# Patient Record
Sex: FEMALE | Race: WHITE | NOT HISPANIC OR LATINO | Employment: FULL TIME | URBAN - METROPOLITAN AREA
[De-identification: names, ages, dates, MRNs, and addresses within clinical notes are randomized per-mention and may not be internally consistent; named-entity substitution may affect disease eponyms.]

---

## 2017-01-18 ENCOUNTER — PATIENT MESSAGE (OUTPATIENT)
Dept: FAMILY MEDICINE | Facility: CLINIC | Age: 52
End: 2017-01-18

## 2017-01-18 DIAGNOSIS — Z00.00 ROUTINE GENERAL MEDICAL EXAMINATION AT A HEALTH CARE FACILITY: Primary | ICD-10-CM

## 2017-03-23 ENCOUNTER — OFFICE VISIT (OUTPATIENT)
Dept: FAMILY MEDICINE | Facility: CLINIC | Age: 52
End: 2017-03-23
Payer: COMMERCIAL

## 2017-03-23 VITALS
HEIGHT: 63 IN | WEIGHT: 134.5 LBS | TEMPERATURE: 98 F | DIASTOLIC BLOOD PRESSURE: 88 MMHG | BODY MASS INDEX: 23.83 KG/M2 | SYSTOLIC BLOOD PRESSURE: 118 MMHG

## 2017-03-23 DIAGNOSIS — Z12.11 SCREEN FOR COLON CANCER: Primary | ICD-10-CM

## 2017-03-23 DIAGNOSIS — R31.9 URINARY TRACT INFECTION WITH HEMATURIA, SITE UNSPECIFIED: ICD-10-CM

## 2017-03-23 DIAGNOSIS — N39.0 URINARY TRACT INFECTION WITH HEMATURIA, SITE UNSPECIFIED: ICD-10-CM

## 2017-03-23 LAB
BILIRUB SERPL-MCNC: NEGATIVE MG/DL
BLOOD URINE, POC: NORMAL
COLOR, POC UA: NORMAL
GLUCOSE UR QL STRIP: NORMAL
KETONES UR QL STRIP: NEGATIVE
LEUKOCYTE ESTERASE URINE, POC: NORMAL
NITRITE, POC UA: NEGATIVE
PH, POC UA: 8
PROTEIN, POC: NEGATIVE
SPECIFIC GRAVITY, POC UA: 1.01
UROBILINOGEN, POC UA: NORMAL

## 2017-03-23 PROCEDURE — 99213 OFFICE O/P EST LOW 20 MIN: CPT | Mod: 25,S$GLB,, | Performed by: FAMILY MEDICINE

## 2017-03-23 PROCEDURE — 99999 PR PBB SHADOW E&M-EST. PATIENT-LVL III: CPT | Mod: PBBFAC,,, | Performed by: FAMILY MEDICINE

## 2017-03-23 PROCEDURE — 1160F RVW MEDS BY RX/DR IN RCRD: CPT | Mod: S$GLB,,, | Performed by: FAMILY MEDICINE

## 2017-03-23 PROCEDURE — 87086 URINE CULTURE/COLONY COUNT: CPT

## 2017-03-23 PROCEDURE — 81002 URINALYSIS NONAUTO W/O SCOPE: CPT | Mod: S$GLB,,, | Performed by: FAMILY MEDICINE

## 2017-03-23 NOTE — PATIENT INSTRUCTIONS
"    PLEASE CALL [phone] TO DISCUSS SCHEDULING YOUR COLONOSCOPY OR UPPER GI SCOPE (EGD).     They will also discuss the different cleanses to take the night before your colonoscopy, so that the physician can effectively view the lining of your colon.     ==================      Do not wear thongs because these push the "bugs" around the rectum to the entrance of your bladder    Drink 6-8 glasses of water per day - until your urine is clear    Avoid caffeine , stimulants, sudafed, antihistamines (Claritin, Zyrtec, Allegra), and irritants    Empty bladder every 3-4 hours    Urinate before and after intercourse    If you wear pads, change them every 3-4 hours to keep the area dry    If not better, consider seeing Urology    "

## 2017-03-23 NOTE — PROGRESS NOTES
"Subjective:      Patient ID: Grace Beltran is a 51 y.o. female.    Chief Complaint: Urinary Tract Infection    Here today for recurrent bladder symptoms. Recently tx with levofloxacin for 7 days at urgent care center.  She states that they called and said that urine culture was negative.  She still has frequency and urgency.  She drinks several cup of coffee and caffeinated tea to stay awake with work.    Current Outpatient Prescriptions on File Prior to Visit   Medication Sig    citalopram (CELEXA) 40 MG tablet Take 1 tablet (40 mg total) by mouth once daily.     No current facility-administered medications on file prior to visit.      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 Bowel sounds are normal. She exhibits no mass. There is no tenderness. There is no rebound, no guarding and no CVA tenderness.     URINALYSIS -   5 blood  - nitrite  tr leukocytes  No ketones    Assessment:     1. Screen for colon cancer    2. Urinary tract infection with hematuria, site unspecified      Plan:     Orders Placed This Encounter    Urine culture    POCT urine dipstick without microscope    Case request GI: COLONOSCOPY       Patient Instructions       PLEASE CALL " "[phone] TO DISCUSS SCHEDULING YOUR COLONOSCOPY OR UPPER GI SCOPE (EGD).     They will also discuss the different cleanses to take the night before your colonoscopy, so that the physician can effectively view the lining of your colon.     ==================      Do not wear thongs because these push the "bugs" around the rectum to the entrance of your bladder    Drink 6-8 glasses of water per day - until your urine is clear    Avoid caffeine , stimulants, sudafed, antihistamines (Claritin, Zyrtec, Allegra), and irritants    Empty bladder every 3-4 hours    Urinate before and after intercourse    If you wear pads, change them every 3-4 hours to keep the area dry    If not better, consider seeing Urology                                "

## 2017-03-23 NOTE — MR AVS SNAPSHOT
"    [facility]  [address] [facility], [address]  [facility] [address]  Phone: [phone]  Fax: [phone]                  Grace Beltran   3/23/2017 11:20 AM   Office Visit    Description:  Female : 1965   Provider:  Evelia Wynn MD   Department:  [facility]           Reason for Visit     Urinary Tract Infection           Diagnoses this Visit        Comments    Screen for colon cancer    -  Primary            To Do List           Future Appointments        Provider Department Dept Phone    2017 8:00 AM LAB, [facility] [phone]    2017 10:00 AM Evelia Wynn MD [facility] [phone]      Goals (5 Years of Data)     None      [facility]     [facility] On Call Nurse Care Line -  Assistance  Registered nurses in the [facility] provide clinical advisement, health education, appointment booking, and other advisory services.  Call for this free service at [phone].             Medications           Message regarding Medications     Verify the changes and/or additions to your medication regime listed below are the same as discussed with your clinician today.  If any of these changes or additions are incorrect, please notify your healthcare provider.             Verify that the below list of medications is an accurate representation of the medications you are currently taking.  If none reported, the list may be blank. If incorrect, please contact your healthcare provider. Carry this list with you in case of emergency.           Current Medications     citalopram (CELEXA) 40 MG tablet Take 1 tablet (40 mg total) by mouth once daily.           Clinical Reference Information           Your Vitals Were     BP Temp Height Weight Last Period BMI    118/88 98.2 °F (36.8 °C) 5' 3" (1.6 m) 61 kg (134 lb 7.7 oz) 2016 (Approximate) 23.82 kg/m2      Blood Pressure          Most " "Recent Value    BP  118/88      Allergies as of 3/23/2017     Codeine    Hydrocodone    Promethazine      Immunizations Administered on Date of Encounter - 3/23/2017     None      Orders Placed During Today's Visit      Normal Orders This Visit    Case request GI: COLONOSCOPY       Instructions        PLEASE CALL [phone] TO DISCUSS SCHEDULING YOUR COLONOSCOPY OR UPPER GI SCOPE (EGD).     They will also discuss the different cleanses to take the night before your colonoscopy, so that the physician can effectively view the lining of your colon.     ==================      Do not wear thongs because these push the "bugs" around the rectum to the entrance of your bladder    Drink 6-8 glasses of water per day - until your urine is clear    Avoid caffeine , stimulants, sudafed, antihistamines (Claritin, Zyrtec, Allegra), and irritants    Empty bladder every 3-4 hours    Urinate before and after intercourse    If you wear pads, change them every 3-4 hours to keep the area dry    Follow up  if symptoms worsen or persist         Language Assistance Services     ATTENTION: Language assistance services are available, free of charge. Please call [phone].      ATENCIÓN: Si habla español, tiene a delgado disposición servicios gratuitos de asistencia lingüística. Llame al [phone].     GINO Ý: N?u b?n nói Ti?ng Vi?t, có các d?ch v? h? tr? ngôn ng? mi?n phí dành cho b?n. G?i s? [phone].         [facility] complies with applicable Federal civil rights laws and does not discriminate on the basis of race, color, national origin, age, disability, or sex.        "

## 2017-03-25 LAB — BACTERIA UR CULT: NO GROWTH

## 2017-05-18 ENCOUNTER — LAB VISIT (OUTPATIENT)
Dept: LAB | Facility: HOSPITAL | Age: 52
End: 2017-05-18
Attending: FAMILY MEDICINE
Payer: COMMERCIAL

## 2017-05-18 DIAGNOSIS — Z00.00 ROUTINE GENERAL MEDICAL EXAMINATION AT A HEALTH CARE FACILITY: ICD-10-CM

## 2017-05-18 LAB
ALBUMIN SERPL BCP-MCNC: 3.5 G/DL
ALP SERPL-CCNC: 78 U/L
ALT SERPL W/O P-5'-P-CCNC: 28 U/L
ANION GAP SERPL CALC-SCNC: 8 MMOL/L
AST SERPL-CCNC: 22 U/L
BASOPHILS # BLD AUTO: 0.01 K/UL
BASOPHILS NFR BLD: 0.3 %
BILIRUB SERPL-MCNC: 0.4 MG/DL
BUN SERPL-MCNC: 9 MG/DL
CALCIUM SERPL-MCNC: 9.2 MG/DL
CHLORIDE SERPL-SCNC: 106 MMOL/L
CHOLEST/HDLC SERPL: 3.2 {RATIO}
CO2 SERPL-SCNC: 29 MMOL/L
CREAT SERPL-MCNC: 0.8 MG/DL
DIFFERENTIAL METHOD: ABNORMAL
EOSINOPHIL # BLD AUTO: 0.3 K/UL
EOSINOPHIL NFR BLD: 8.8 %
ERYTHROCYTE [DISTWIDTH] IN BLOOD BY AUTOMATED COUNT: 13.2 %
EST. GFR  (AFRICAN AMERICAN): >60 ML/MIN/1.73 M^2
EST. GFR  (NON AFRICAN AMERICAN): >60 ML/MIN/1.73 M^2
GLUCOSE SERPL-MCNC: 84 MG/DL
HCT VFR BLD AUTO: 43.1 %
HCV AB SERPL QL IA: NEGATIVE
HDL/CHOLESTEROL RATIO: 31.2 %
HDLC SERPL-MCNC: 218 MG/DL
HDLC SERPL-MCNC: 68 MG/DL
HGB BLD-MCNC: 14.2 G/DL
LDLC SERPL CALC-MCNC: 127.2 MG/DL
LYMPHOCYTES # BLD AUTO: 1 K/UL
LYMPHOCYTES NFR BLD: 31.1 %
MCH RBC QN AUTO: 29.5 PG
MCHC RBC AUTO-ENTMCNC: 32.9 %
MCV RBC AUTO: 90 FL
MONOCYTES # BLD AUTO: 0.4 K/UL
MONOCYTES NFR BLD: 12.6 %
NEUTROPHILS # BLD AUTO: 1.5 K/UL
NEUTROPHILS NFR BLD: 47.2 %
NONHDLC SERPL-MCNC: 150 MG/DL
PLATELET # BLD AUTO: 256 K/UL
PMV BLD AUTO: 10.3 FL
POTASSIUM SERPL-SCNC: 4.2 MMOL/L
PROT SERPL-MCNC: 6.8 G/DL
RBC # BLD AUTO: 4.81 M/UL
SODIUM SERPL-SCNC: 143 MMOL/L
TRIGL SERPL-MCNC: 114 MG/DL
TSH SERPL DL<=0.005 MIU/L-ACNC: 1.08 UIU/ML
WBC # BLD AUTO: 3.18 K/UL

## 2017-05-18 PROCEDURE — 36415 COLL VENOUS BLD VENIPUNCTURE: CPT | Mod: PO

## 2017-05-18 PROCEDURE — 80061 LIPID PANEL: CPT

## 2017-05-18 PROCEDURE — 85025 COMPLETE CBC W/AUTO DIFF WBC: CPT

## 2017-05-18 PROCEDURE — 84443 ASSAY THYROID STIM HORMONE: CPT

## 2017-05-18 PROCEDURE — 80053 COMPREHEN METABOLIC PANEL: CPT

## 2017-05-18 PROCEDURE — 86803 HEPATITIS C AB TEST: CPT

## 2017-05-24 ENCOUNTER — OFFICE VISIT (OUTPATIENT)
Dept: FAMILY MEDICINE | Facility: CLINIC | Age: 52
End: 2017-05-24
Payer: COMMERCIAL

## 2017-05-24 VITALS
DIASTOLIC BLOOD PRESSURE: 72 MMHG | HEIGHT: 64 IN | HEART RATE: 64 BPM | SYSTOLIC BLOOD PRESSURE: 104 MMHG | BODY MASS INDEX: 23.34 KG/M2 | TEMPERATURE: 98 F | WEIGHT: 136.69 LBS

## 2017-05-24 DIAGNOSIS — M16.12 PRIMARY OSTEOARTHRITIS OF LEFT HIP: ICD-10-CM

## 2017-05-24 DIAGNOSIS — N95.1 HOT FLASHES DUE TO MENOPAUSE: ICD-10-CM

## 2017-05-24 DIAGNOSIS — Z12.11 SCREEN FOR COLON CANCER: ICD-10-CM

## 2017-05-24 DIAGNOSIS — M85.88 OSTEOPENIA OF SPINE: ICD-10-CM

## 2017-05-24 DIAGNOSIS — F41.9 ANXIETY: ICD-10-CM

## 2017-05-24 DIAGNOSIS — M25.552 LEFT HIP PAIN: ICD-10-CM

## 2017-05-24 DIAGNOSIS — Z00.00 ROUTINE GENERAL MEDICAL EXAMINATION AT A HEALTH CARE FACILITY: Primary | ICD-10-CM

## 2017-05-24 DIAGNOSIS — K59.09 CHRONIC CONSTIPATION: ICD-10-CM

## 2017-05-24 PROCEDURE — 99396 PREV VISIT EST AGE 40-64: CPT | Mod: S$GLB,,, | Performed by: FAMILY MEDICINE

## 2017-05-24 PROCEDURE — 99999 PR PBB SHADOW E&M-EST. PATIENT-LVL III: CPT | Mod: PBBFAC,,, | Performed by: FAMILY MEDICINE

## 2017-05-24 RX ORDER — CITALOPRAM 40 MG/1
40 TABLET, FILM COATED ORAL DAILY
Qty: 90 TABLET | Refills: 3 | Status: SHIPPED | OUTPATIENT
Start: 2017-05-24 | End: 2018-06-20 | Stop reason: SDUPTHER

## 2017-05-24 NOTE — PROGRESS NOTES
Subjective:      Patient ID: Grace Beltran is a 51 y.o. female.    Chief Complaint: Annual Exam (refill CELEXA)    Here today for general exam.     Celexa works well for anxiety and she would like refills neck    She has persistent left hip pain and injection did not last very long.  She has not been to physical therapy for this and would like referral    She was diagnosed with osteopenia in the past, asking that calcium and vitamin D supplementation.  She is due to see her gynecologist    She has chronic constipation and takes fiber on a regular basis, never had colonoscopy but is willing to have one this summer    Denies any chest pain, shortness of breath, nausea vomitingdiarrhea, blood in stool, heartburn    The 10-year ASCVD risk score (Debbie ANN Jr., et al., 2013) is: 0.9%    Values used to calculate the score:      Age: 51 years      Sex: Female      Is Non- : No      Diabetic: No      Tobacco smoker: No      Systolic Blood Pressure: 104 mmHg      Is BP treated: No      HDL Cholesterol: 68 mg/dL      Total Cholesterol: 218 mg/dL    No results found for: HGBA1C  No results found for: MICROALBUR  Lab Results   Component Value Date    LDLCALC 127.2 05/18/2017    LDLCALC 128.2 11/09/2015    CHOL 218 (H) 05/18/2017    HDL 68 05/18/2017    TRIG 114 05/18/2017       Lab Results   Component Value Date     05/18/2017    K 4.2 05/18/2017     05/18/2017    CO2 29 05/18/2017    GLU 84 05/18/2017    BUN 9 05/18/2017    CREATININE 0.8 05/18/2017    CALCIUM 9.2 05/18/2017    PROT 6.8 05/18/2017    ALBUMIN 3.5 05/18/2017    BILITOT 0.4 05/18/2017    ALKPHOS 78 05/18/2017    AST 22 05/18/2017    ALT 28 05/18/2017    ANIONGAP 8 05/18/2017    ESTGFRAFRICA >60.0 05/18/2017    EGFRNONAA >60.0 05/18/2017    WBC 3.18 (L) 05/18/2017    HGB 14.2 05/18/2017    HCT 43.1 05/18/2017    MCV 90 05/18/2017     05/18/2017    TSH 1.077 05/18/2017         Current Outpatient Prescriptions on File  "Prior to Visit   Medication Sig    [DISCONTINUED] citalopram (CELEXA) 40 MG tablet Take 1 tablet (40 mg total) by mouth once daily.     No current facility-administered medications on file prior to visit.      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 She appears well-developed and well-nourished.   HENT:   Head: Normocephalic and atraumatic.   Right Ear: External ear normal.   Left Ear: External ear normal.   Nose: Nose normal.   Mouth/Throat: Oropharynx is clear and moist.   Eyes: EOM are normal. Pupils are equal, round, and reactive to light.   Neck: Neck supple. No thyromegaly present.   Cardiovascular: Normal rate, regular rhythm, normal heart sounds and intact distal pulses.    No murmur heard.  Pulmonary/Chest: Effort normal and breath sounds normal. She has no wheezes.   Abdominal: Soft. Bowel sounds are normal. She exhibits no distension and no mass. There is no tenderness. There is no rebound and no guarding.   Musculoskeletal: She " exhibits no edema.   Lymphadenopathy:     She has no cervical adenopathy.   Neurological: She is alert and oriented to person, place, and time.   Skin: Skin is warm and dry.   Psychiatric: She has a normal mood and affect. Her behavior is normal.     Assessment:     1. Routine general medical examination at a health care facility    2. Screen for colon cancer    3. Anxiety    4. Osteopenia of spine    5. Chronic constipation    6. Hot flashes due to menopause    7. Left hip pain    8. Primary osteoarthritis of left hip      Plan:     Orders Placed This Encounter    citalopram (CELEXA) 40 MG tablet    Case request GI: COLONOSCOPY       Patient Instructions   Follow with GYN  ========      PLEASE CALL [phone] TO DISCUSS SCHEDULING YOUR COLONOSCOPY OR UPPER GI SCOPE (EGD).     They will also discuss the different cleanses to take the night before your colonoscopy, so that the physician can effectively view the lining of your colon.     ======    Yes!    There are some safe over the counter medications to try for menopausal symptoms of hot flashes, mood swings, etc.    Since they are not regulated, you may need to take ONE of these DAILY FOR ABOUT 3 months to notice a difference.     Try ONE of these DAILY FOR 3 MONTHS:    Poise, Estroven, black cohosh ,soy (milk, Edamame beans - frozen section of grocery dept)    Also, most women notice that they have less hot flashes if they get 20 minutes of rigorous exercise daily (huffing and puffing for 20 minutes straight).     We try these safer alternatives before starting hormone replacement - because with hormones, there is a slight increased risk of clots, heart attack, stroke and cancer. Although, there are much smaller dosages that do help with hot flashes and emotions. We can discuss this in further if we decide to go this route.     Take care!  -----------------------------------------    Your Bone density test shows OSTEOPENIA - In between normal bone strength and  weakened bone strength with osteoporosis.     You are at slightly increased risk for hip fracture, spinal compression fracture in the future, but the experts do not recommend taking a prescription medication at this time    WEIGHT BEARING EXERCISE (carrying light weights) is the BEST way to strengthen the bone where your muscles insert & prevent future fractures.     Focus on 1200 mg of CALCIUM daily  - Which is the equivalent of 3 glasses of milk daily. If you cannot tolerate this, you can substitute yogurt or cheese for one of these servings.  Eat foods rich in calcium.Also you can take TUMS supplements or Viactiv chocolate chews calcium supplement.     Also, 800 units of VITAMIN D daily - This is the equivalent of 10 minutes of direct sunlight daily. If you are not in the sun, consider Foods rich in vitamin D and over the counter  supplement .     Let's repeat this test in 2 years. This is a test that is easily done on the same day as your mammogram      ===================================================    RECOMMENDATIONS FOR FEMALES    Prevent the #1 cause of death- cardiovascular disease (HEART ATTACK & STROKE) by checking for normal blood pressure, cholesterol, sugars, & by not smoking.     VACCINES: Yearly FLU shot, ONE PNEUMONIA shot after 65, ONE SHINGLES shot after 60    Screening colonoscopy at AGE  50 & every 10 years to check for COLON CANCER,  one of the most common & preventable cancers. Repeat in 3 years if POLYP found    I recommend  high fiber (5 fresh fruits or vegetables daily), low fat diet and aerobic  exercise (huffing/ puffing/ sweating for 20 min straight at least 4 days a week)    Follow up yearly  fasting lipids, CMP, CBC, TSH prior.   ==============================================================    You can call the [facility], physical therapy center for an appointment    [address] [facility] - not far from the [address] [place] [address], right past  [place]  [phone]      They will help diagnose the cause of your problem.     Let me know if your symptoms persist

## 2017-05-24 NOTE — PATIENT INSTRUCTIONS
Follow with GYN  ========      PLEASE CALL [phone] TO DISCUSS SCHEDULING YOUR COLONOSCOPY OR UPPER GI SCOPE (EGD).     They will also discuss the different cleanses to take the night before your colonoscopy, so that the physician can effectively view the lining of your colon.     ======    Yes!    There are some safe over the counter medications to try for menopausal symptoms of hot flashes, mood swings, etc.    Since they are not regulated, you may need to take ONE of these DAILY FOR ABOUT 3 months to notice a difference.     Try ONE of these DAILY FOR 3 MONTHS:    Poise, Estroven, black cohosh ,soy (milk, Edamame beans - frozen section of grocery dept)    Also, most women notice that they have less hot flashes if they get 20 minutes of rigorous exercise daily (huffing and puffing for 20 minutes straight).     We try these safer alternatives before starting hormone replacement - because with hormones, there is a slight increased risk of clots, heart attack, stroke and cancer. Although, there are much smaller dosages that do help with hot flashes and emotions. We can discuss this in further if we decide to go this route.     Take care!  -----------------------------------------    Your Bone density test shows OSTEOPENIA - In between normal bone strength and weakened bone strength with osteoporosis.     You are at slightly increased risk for hip fracture, spinal compression fracture in the future, but the experts do not recommend taking a prescription medication at this time    WEIGHT BEARING EXERCISE (carrying light weights) is the BEST way to strengthen the bone where your muscles insert & prevent future fractures.     Focus on 1200 mg of CALCIUM daily  - Which is the equivalent of 3 glasses of milk daily. If you cannot tolerate this, you can substitute yogurt or cheese for one of these servings.  Eat foods rich in calcium.Also you can take TUMS supplements or Viactiv chocolate chews calcium supplement.     Also,  800 units of VITAMIN D daily - This is the equivalent of 10 minutes of direct sunlight daily. If you are not in the sun, consider Foods rich in vitamin D and over the counter  supplement .     Let's repeat this test in 2 years. This is a test that is easily done on the same day as your mammogram      ===================================================    RECOMMENDATIONS FOR FEMALES    Prevent the #1 cause of death- cardiovascular disease (HEART ATTACK & STROKE) by checking for normal blood pressure, cholesterol, sugars, & by not smoking.     VACCINES: Yearly FLU shot, ONE PNEUMONIA shot after 65, ONE SHINGLES shot after 60    Screening colonoscopy at AGE  50 & every 10 years to check for COLON CANCER,  one of the most common & preventable cancers. Repeat in 3 years if POLYP found    I recommend  high fiber (5 fresh fruits or vegetables daily), low fat diet and aerobic  exercise (huffing/ puffing/ sweating for 20 min straight at least 4 days a week)    Follow up yearly  fasting lipids, CMP, CBC, TSH prior.   ==============================================================    You can call the [facility], physical therapy center for an appointment    [address] [facility] - not far from the [address] [place] [address], right past [facility]  [phone]      They will help diagnose the cause of your problem.     Let me know if your symptoms persist

## 2017-05-30 DIAGNOSIS — Z12.11 SPECIAL SCREENING FOR MALIGNANT NEOPLASMS, COLON: Primary | ICD-10-CM

## 2017-05-30 RX ORDER — POLYETHYLENE GLYCOL 3350, SODIUM SULFATE ANHYDROUS, SODIUM BICARBONATE, SODIUM CHLORIDE, POTASSIUM CHLORIDE 236; 22.74; 6.74; 5.86; 2.97 G/4L; G/4L; G/4L; G/4L; G/4L
4 POWDER, FOR SOLUTION ORAL ONCE
Qty: 4000 ML | Refills: 0 | Status: SHIPPED | OUTPATIENT
Start: 2017-05-30 | End: 2017-05-30

## 2017-07-05 ENCOUNTER — PATIENT MESSAGE (OUTPATIENT)
Dept: FAMILY MEDICINE | Facility: CLINIC | Age: 52
End: 2017-07-05

## 2017-07-20 ENCOUNTER — OFFICE VISIT (OUTPATIENT)
Dept: FAMILY MEDICINE | Facility: CLINIC | Age: 52
End: 2017-07-20
Payer: COMMERCIAL

## 2017-07-20 VITALS
BODY MASS INDEX: 23.49 KG/M2 | SYSTOLIC BLOOD PRESSURE: 118 MMHG | HEIGHT: 64 IN | WEIGHT: 137.56 LBS | TEMPERATURE: 98 F | DIASTOLIC BLOOD PRESSURE: 70 MMHG | HEART RATE: 72 BPM

## 2017-07-20 DIAGNOSIS — R19.7 DIARRHEA, UNSPECIFIED TYPE: Primary | ICD-10-CM

## 2017-07-20 DIAGNOSIS — M25.521 RIGHT ELBOW PAIN: ICD-10-CM

## 2017-07-20 DIAGNOSIS — Z98.890 STATUS POST HIP SURGERY: ICD-10-CM

## 2017-07-20 PROCEDURE — 99214 OFFICE O/P EST MOD 30 MIN: CPT | Mod: S$GLB,,, | Performed by: FAMILY MEDICINE

## 2017-07-20 PROCEDURE — 99999 PR PBB SHADOW E&M-EST. PATIENT-LVL III: CPT | Mod: PBBFAC,,, | Performed by: FAMILY MEDICINE

## 2017-07-20 RX ORDER — HYOSCYAMINE SULFATE 0.12 MG/1
0.12 TABLET SUBLINGUAL EVERY 4 HOURS PRN
Qty: 15 TABLET | Refills: 12 | Status: SHIPPED | OUTPATIENT
Start: 2017-07-20 | End: 2018-04-19 | Stop reason: ALTCHOICE

## 2017-07-20 NOTE — PATIENT INSTRUCTIONS
She will reschedule colonoscopy after this illness resolves    Probiotic daily    [place] diet after   Avoid milk products and  fried, heavy foods    I will email culture results next week

## 2017-07-20 NOTE — PROGRESS NOTES
Subjective:      Patient ID: Grace Beltran is a 51 y.o. female.    Chief Complaint: Diarrhea (post CRUISE, WITH URGENCY AND ODOR)    Here today for persistent diarrhea, although it has decreased to 2 times a day, very foul-smelling.  Symptoms began while on [place] 30 with fever, severe diarrhea 7 days.  Her mom also had this illness.  She was in bed for a while.  With numerous travels to different countries, she has had travel associated diarrhea in the past and this feels similar.  Denies any blood in stool, she is able to work, she is not taking any medications, never took antibiotics prior to or after her current diarrhea illness    She would like another referral to physical therapy as they have been helping her for the right elbow and hip with dry needling    Current Outpatient Prescriptions on File Prior to Visit   Medication Sig    citalopram (CELEXA) 40 MG tablet Take 1 tablet (40 mg total) by mouth once daily.     No current facility-administered medications on file prior to visit.      Past Medical History:   Diagnosis Date    Acne     Dermatologist prescribes spironolactone     Anxiety     celexa 40    Chronic constipation 5/24/2017    Hot flashes due to menopause 5/24/2017    Osteopenia of spine 5/24/2017    Primary osteoarthritis of left hip 05/24/2017    [facility] needling at [facility], MRI 2016, Injection didn't help much    Right elbow pain 07/20/2017    dry needling at [facility]     Past Surgical History:   Procedure Laterality Date    right hip surgery      April 24, 2012     Social History     Social History Narrative     with EMERITA (works with HIV, Hepatitis C), female partner    2 daughters (Faby, 1 son (Yves)    Nonsmoker, ETOH 4 x / week    GYN Blaine, never had colonoscopy     Family History   Problem Relation Age of Onset    Hypertension Mother     Hyperlipidemia Mother     Osteoporosis Mother     Breast cancer Maternal Aunt 65     Vitals:     "07/20/17 1131   BP: 118/70   Pulse: 72   Temp: 98.4 °F (36.9 °C)   Weight: 62.4 kg (137 lb 9.1 oz)   Height: 5' 3.5" (1.613 m)   PainSc: 0-No pain     Objective:   Physical Exam   HENT:   Mouth/Throat: Oropharynx is clear and moist.   Eyes: Conjunctivae are normal. No scleral icterus.   Abdominal: Soft. She exhibits no distension and no mass. There is no tenderness. There is no rebound and no guarding.   Increased bowel sounds     Assessment:     1. Diarrhea, unspecified type    2. History of hip surgery    3. Right elbow pain      Plan:     Orders Placed This Encounter    Stool culture    Clostridium difficile EIA    Stool Exam-Ova,Cysts,Parasites    hyoscyamine (LEVSIN/SL) 0.125 mg Subl       Patient Instructions   She will reschedule colonoscopy after this illness resolves    Probiotic daily    [place] diet after   Avoid milk products and  fried, heavy foods    I will email culture results next week      dry needling working well, she would like another referral to [facility]    You can call the [facility], physical therapy center for an appointment    [address] [facility] - not far from the [address] [place] [address], right past [facility]  [phone]      Let me know if your symptoms persist                            "

## 2017-07-21 ENCOUNTER — TELEPHONE (OUTPATIENT)
Dept: FAMILY MEDICINE | Facility: CLINIC | Age: 52
End: 2017-07-21

## 2017-07-21 ENCOUNTER — LAB VISIT (OUTPATIENT)
Dept: LAB | Facility: HOSPITAL | Age: 52
End: 2017-07-21
Attending: FAMILY MEDICINE
Payer: COMMERCIAL

## 2017-07-21 DIAGNOSIS — K52.9 CHRONIC DIARRHEA: Primary | ICD-10-CM

## 2017-07-21 DIAGNOSIS — R19.7 DIARRHEA, UNSPECIFIED TYPE: ICD-10-CM

## 2017-07-21 PROCEDURE — 87184 SC STD DISK METHOD PER PLATE: CPT

## 2017-07-21 PROCEDURE — 87045 FECES CULTURE AEROBIC BACT: CPT

## 2017-07-21 PROCEDURE — 87427 SHIGA-LIKE TOXIN AG IA: CPT | Mod: 59

## 2017-07-21 PROCEDURE — 87046 STOOL CULTR AEROBIC BACT EA: CPT

## 2017-07-21 PROCEDURE — 87209 SMEAR COMPLEX STAIN: CPT

## 2017-07-21 NOTE — TELEPHONE ENCOUNTER
Spoke w/Jennifer in [facility], stated pt's sample for C-Diff was not sufficient to test for it. Sample was not of the correct consistency. Test was canceled. Physician informed.

## 2017-07-21 NOTE — TELEPHONE ENCOUNTER
----- Message from Daria Burch sent at 7/21/2017 12:40 PM CDT -----  Contact:  [facility]  ext  [phone]  Is she calling to let you know that she had to cancel a test for the patient she will give details when you call her back .

## 2017-07-23 LAB
E COLI SXT1 STL QL IA: NEGATIVE
E COLI SXT2 STL QL IA: NEGATIVE

## 2017-07-24 LAB — O+P STL TRI STN: NORMAL

## 2017-07-24 NOTE — TELEPHONE ENCOUNTER
I am concerned that she may have Clostridium Difficile (C Diff) , so please ask what pt needs to do for correct consistency of stool specimen. She has been travelling out of the country . I put in new order for C Diff stool culture

## 2017-07-24 NOTE — TELEPHONE ENCOUNTER
Spoke with Nyeosha with lab.  Specimen was formed.  C diff can only be run on a non formed specimen.

## 2017-07-25 ENCOUNTER — PATIENT MESSAGE (OUTPATIENT)
Dept: FAMILY MEDICINE | Facility: CLINIC | Age: 52
End: 2017-07-25

## 2017-07-25 LAB
BACTERIA STL CULT: NORMAL

## 2017-07-25 RX ORDER — CIPROFLOXACIN 500 MG/1
500 TABLET ORAL EVERY 12 HOURS
Qty: 14 TABLET | Refills: 0 | Status: SHIPPED | OUTPATIENT
Start: 2017-07-25 | End: 2017-08-01

## 2017-11-09 ENCOUNTER — OFFICE VISIT (OUTPATIENT)
Dept: OPTOMETRY | Facility: CLINIC | Age: 52
End: 2017-11-09
Payer: COMMERCIAL

## 2017-11-09 DIAGNOSIS — H02.89 EYELID PAIN, RIGHT: Primary | ICD-10-CM

## 2017-11-09 PROBLEM — M25.521 RIGHT ELBOW PAIN: Status: RESOLVED | Noted: 2017-07-20 | Resolved: 2017-11-09

## 2017-11-09 PROCEDURE — 99999 PR PBB SHADOW E&M-EST. PATIENT-LVL II: CPT | Mod: PBBFAC,,, | Performed by: OPTOMETRIST

## 2017-11-09 PROCEDURE — 92015 DETERMINE REFRACTIVE STATE: CPT | Mod: S$GLB,,, | Performed by: OPTOMETRIST

## 2017-11-09 PROCEDURE — 92014 COMPRE OPH EXAM EST PT 1/>: CPT | Mod: S$GLB,,, | Performed by: OPTOMETRIST

## 2017-11-09 NOTE — PROGRESS NOTES
HPI     Grace Beltran is a 52 y.o. Female who returns  for continued eye care.   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  She wears progressive lenses. She returns today   because she noticed since about 3 weeks that she has severe pain almost   every day in her right eye. It is like a pressure pain behind her eye. She   also feels as if her distance vision may has changed and would like to get   a updated prescription.       (+)blurred vision distance  (--)Headaches  (--)diplopia  (--)flashes  (--)floaters  (+)pain right eye  Pain scale 6  (--)Itching  (--)tearing  (--)burning  (--)Dryness  (--) OTC Drops  (--)Photophobia    Last edited by Vinicius Patterson, OD on 11/9/2017 12:56 PM. (History)            Assessment /Plan     For exam results, see Encounter Report.    1. Eyelid pain, right  in absence of ocular pathology  - No papilledema  - No optic neuritis/pallor  - No uveitis  - No infection  - No ocular inflammatory process    2. Optic Nerve Drusen --> stable  - no treatment needed       3. Myopia with Presbyopia  -   Glasses Prescription (11/9/2017)        Sphere Cylinder Dist VA Add    Right -3.25 Sphere 20/20     Left -3.25 Sphere 20/20     Type:  PAL    Expiration Date:  11/10/2018      Glasses Prescription (11/9/2017)  #2       Sphere Cylinder Dist VA Add    Right -1.75 Sphere  +1.00    Left -1.75 Sphere  +1.00    Type:   Computer Lined Bifocal    Expiration Date:  11/10/2018    Comments:  For computer and near distances              Patient education; RTC in 1 year, sooner prn

## 2018-01-08 ENCOUNTER — OFFICE VISIT (OUTPATIENT)
Dept: URGENT CARE | Facility: CLINIC | Age: 53
End: 2018-01-08
Payer: COMMERCIAL

## 2018-01-08 VITALS
SYSTOLIC BLOOD PRESSURE: 127 MMHG | HEIGHT: 64 IN | WEIGHT: 140 LBS | TEMPERATURE: 101 F | HEART RATE: 115 BPM | BODY MASS INDEX: 23.9 KG/M2 | OXYGEN SATURATION: 99 % | DIASTOLIC BLOOD PRESSURE: 81 MMHG

## 2018-01-08 DIAGNOSIS — J10.1 INFLUENZA A: ICD-10-CM

## 2018-01-08 DIAGNOSIS — R50.9 FEVER, UNSPECIFIED FEVER CAUSE: Primary | ICD-10-CM

## 2018-01-08 LAB
CTP QC/QA: YES
FLUAV AG NPH QL: POSITIVE
FLUBV AG NPH QL: NEGATIVE

## 2018-01-08 PROCEDURE — 99203 OFFICE O/P NEW LOW 30 MIN: CPT | Mod: S$GLB,,, | Performed by: PHYSICIAN ASSISTANT

## 2018-01-08 PROCEDURE — 87804 INFLUENZA ASSAY W/OPTIC: CPT | Mod: QW,S$GLB,, | Performed by: PHYSICIAN ASSISTANT

## 2018-01-08 RX ORDER — OSELTAMIVIR PHOSPHATE 75 MG/1
75 CAPSULE ORAL 2 TIMES DAILY
Qty: 10 CAPSULE | Refills: 0 | Status: SHIPPED | OUTPATIENT
Start: 2018-01-08 | End: 2018-01-18

## 2018-01-08 RX ORDER — FLUTICASONE PROPIONATE 50 MCG
1 SPRAY, SUSPENSION (ML) NASAL DAILY
Qty: 1 BOTTLE | Refills: 0 | Status: SHIPPED | OUTPATIENT
Start: 2018-01-08 | End: 2018-04-19 | Stop reason: ALTCHOICE

## 2018-01-08 RX ORDER — BENZONATATE 100 MG/1
100 CAPSULE ORAL 3 TIMES DAILY PRN
Qty: 30 CAPSULE | Refills: 1 | Status: SHIPPED | OUTPATIENT
Start: 2018-01-08 | End: 2018-04-19 | Stop reason: ALTCHOICE

## 2018-01-08 NOTE — LETTER
January 8, 2018      [facility] [place]  [address]  [place] [address]  Phone: [phone]  Fax: [phone]       Patient: Grace Beltran   YOB: 1965  Date of Visit: 01/08/2018    To Whom It May Concern:    Moe Beltran  was at [facility] on 01/08/2018. She may return to work/school on 1/11/18 with no restrictions. If you have any questions or concerns, or if I can be of further assistance, please do not hesitate to contact me.    Sincerely,    MAYNOR Walter

## 2018-01-09 NOTE — PATIENT INSTRUCTIONS

## 2018-01-09 NOTE — PROGRESS NOTES
"Subjective:       Patient ID: Grace Beltran is a 52 y.o. female.    Vitals:  height is 5' 4" (1.626 m) and weight is 63.5 kg (140 lb). Her temperature is 101 °F (38.3 °C) (abnormal). Her blood pressure is 127/81 and her pulse is 115 (abnormal). Her oxygen saturation is 99%.     Chief Complaint: Cough and Fever    Cough   This is a new problem. The current episode started today. The problem has been gradually worsening. The problem occurs every few minutes. The cough is non-productive. Associated symptoms include chills, a fever, headaches, nasal congestion, postnasal drip and a sore throat. Pertinent negatives include no chest pain, rash or shortness of breath. The symptoms are aggravated by lying down and cold air. She has tried nothing for the symptoms. The treatment provided no relief.   Fever    This is a new problem. The current episode started today. The problem occurs intermittently. The problem has been gradually worsening. The maximum temperature noted was 101 to 101.9 F. The temperature was taken using an oral thermometer. Associated symptoms include congestion, coughing, headaches, muscle aches and a sore throat. Pertinent negatives include no abdominal pain, chest pain, diarrhea, nausea, rash or vomiting. She has tried nothing for the symptoms. The treatment provided no relief.     Review of Systems   Constitution: Positive for chills, decreased appetite, fever and malaise/fatigue.   HENT: Positive for congestion, postnasal drip and sore throat.    Eyes: Negative for blurred vision.   Cardiovascular: Negative for chest pain.   Respiratory: Positive for cough. Negative for shortness of breath.    Skin: Negative for rash.   Musculoskeletal: Negative for back pain and joint pain.   Gastrointestinal: Negative for abdominal pain, diarrhea, nausea and vomiting.   Neurological: Positive for headaches.   Psychiatric/Behavioral: The patient is not nervous/anxious.        Objective:      Physical Exam "   Constitutional: She is oriented to person, place, and time. She appears well-developed and well-nourished. She is cooperative.  Non-toxic appearance. She does not appear ill. No distress.   HENT:   Head: Normocephalic and atraumatic.   Right Ear: Hearing, tympanic membrane, external ear and ear canal normal.   Left Ear: Hearing, tympanic membrane, external ear and ear canal normal.   Nose: Nose normal. No mucosal edema, rhinorrhea or nasal deformity. No epistaxis. Right sinus exhibits no maxillary sinus tenderness and no frontal sinus tenderness. Left sinus exhibits no maxillary sinus tenderness and no frontal sinus tenderness.   Mouth/Throat: Uvula is midline, oropharynx is clear and moist and mucous membranes are normal. No trismus in the jaw. Normal dentition. No uvula swelling. No posterior oropharyngeal erythema.   Eyes: Conjunctivae and lids are normal. No scleral icterus.   Sclera clear bilat   Neck: Trachea normal, full passive range of motion without pain and phonation normal. Neck supple.   Cardiovascular: Normal rate, regular rhythm, normal heart sounds, intact distal pulses and normal pulses.    Pulmonary/Chest: Effort normal and breath sounds normal. No respiratory distress.   Abdominal: Soft. Normal appearance and bowel sounds are normal. She exhibits no distension. There is no tenderness.   Musculoskeletal: Normal range of motion. She exhibits no edema or deformity.   Neurological: She is alert and oriented to person, place, and time. She exhibits normal muscle tone. Coordination normal.   Skin: Skin is warm, dry and intact. She is not diaphoretic. No pallor.   Psychiatric: She has a normal mood and affect. Her speech is normal and behavior is normal. Judgment and thought content normal. Cognition and memory are normal.   Nursing note and vitals reviewed.      Assessment:       1. Fever, unspecified fever cause    2. Influenza A        Plan:         Fever, unspecified fever cause  -     POCT  Influenza A/B    Influenza A    Other orders  -     oseltamivir (TAMIFLU) 75 MG capsule; Take 1 capsule (75 mg total) by mouth 2 (two) times daily.  Dispense: 10 capsule; Refill: 0  -     benzonatate (TESSALON PERLES) 100 MG capsule; Take 1 capsule (100 mg total) by mouth 3 (three) times daily as needed for Cough.  Dispense: 30 capsule; Refill: 1  -     fluticasone (FLONASE) 50 mcg/actuation nasal spray; 1 spray (50 mcg total) by Each Nare route once daily.  Dispense: 1 Bottle; Refill: 0

## 2018-02-15 ENCOUNTER — TELEPHONE (OUTPATIENT)
Dept: OBSTETRICS AND GYNECOLOGY | Facility: CLINIC | Age: 53
End: 2018-02-15

## 2018-02-15 ENCOUNTER — PATIENT MESSAGE (OUTPATIENT)
Dept: FAMILY MEDICINE | Facility: CLINIC | Age: 53
End: 2018-02-15

## 2018-02-15 NOTE — TELEPHONE ENCOUNTER
----- Message from Aline Jackson sent at 2/14/2018  3:55 PM CST -----  Contact: self  Patient is requesting a call back to discuss getting an appointment for a new patient annual visit. Patient can be reached at [phone]

## 2018-03-20 DIAGNOSIS — Z00.00 ANNUAL PHYSICAL EXAM: Primary | ICD-10-CM

## 2018-03-22 ENCOUNTER — OFFICE VISIT (OUTPATIENT)
Dept: OBSTETRICS AND GYNECOLOGY | Facility: CLINIC | Age: 53
End: 2018-03-22
Attending: OBSTETRICS & GYNECOLOGY
Payer: COMMERCIAL

## 2018-03-22 VITALS
SYSTOLIC BLOOD PRESSURE: 112 MMHG | DIASTOLIC BLOOD PRESSURE: 80 MMHG | BODY MASS INDEX: 24.99 KG/M2 | WEIGHT: 146.38 LBS | HEIGHT: 64 IN

## 2018-03-22 DIAGNOSIS — Z78.0 MENOPAUSE: ICD-10-CM

## 2018-03-22 DIAGNOSIS — Z01.419 WELL WOMAN EXAM WITH ROUTINE GYNECOLOGICAL EXAM: Primary | ICD-10-CM

## 2018-03-22 DIAGNOSIS — Z12.4 PAP SMEAR FOR CERVICAL CANCER SCREENING: ICD-10-CM

## 2018-03-22 DIAGNOSIS — Z12.31 SCREENING MAMMOGRAM, ENCOUNTER FOR: ICD-10-CM

## 2018-03-22 PROCEDURE — 88175 CYTOPATH C/V AUTO FLUID REDO: CPT

## 2018-03-22 PROCEDURE — 99386 PREV VISIT NEW AGE 40-64: CPT | Mod: S$GLB,,, | Performed by: OBSTETRICS & GYNECOLOGY

## 2018-03-22 NOTE — PROGRESS NOTES
Subjective:       Patient ID: Grace Beltran is a 52 y.o. female.    Chief Complaint:  Well Woman      History of Present Illness  HPI  This 52 yr old P0 female is here for routine exam and only complaint is hot flashes and just hot.  LMP about yr and half ago.  No abnormal paps no family history of gyn ca or breast ca.  Last pap/mammogram about 4 yrs ago.  Same sex relationship    GYN & OB History  No LMP recorded. Patient is not currently having periods (Reason: Premenopausal).   Date of Last Pap: 2014    OB History    Para Term  AB Living   0 0 0 0 0 0   SAB TAB Ectopic Multiple Live Births   0 0 0 0               Review of Systems  Review of Systems   Constitutional: Negative for chills and fever.   Respiratory: Negative for shortness of breath.    Cardiovascular: Negative for chest pain.   Gastrointestinal: Negative for abdominal pain, nausea and vomiting.   Endocrine: Positive for heat intolerance.   Genitourinary: Negative for difficulty urinating, dyspareunia, genital sores, menstrual problem, pelvic pain, vaginal bleeding, vaginal discharge and vaginal pain.   Skin: Negative for wound.   Hematological: Negative for adenopathy.           Objective:    Physical Exam:   Constitutional: She is oriented to person, place, and time. She appears well-developed and well-nourished.    HENT:   Head: Normocephalic.    Eyes: EOM are normal.    Neck: Normal range of motion.    Cardiovascular: Normal rate.     Pulmonary/Chest: Effort normal. She exhibits no mass and no tenderness. Right breast exhibits no inverted nipple, no mass, no skin change and no tenderness. Left breast exhibits no inverted nipple, no mass, no skin change and no tenderness.        Abdominal: Soft. She exhibits no distension. There is no tenderness.     Genitourinary: Vagina normal and uterus normal. There is no rash, tenderness or lesion on the right labia. There is no rash, tenderness or lesion on the left labia. Uterus is  not tender. Cervix is normal. Right adnexum displays no mass, no tenderness and no fullness. Left adnexum displays no mass, no tenderness and no fullness. Cervix exhibits no discharge.           Musculoskeletal: Normal range of motion.       Neurological: She is alert and oriented to person, place, and time.    Skin: Skin is warm and dry.    Psychiatric: She has a normal mood and affect.          Assessment:        1. Well woman exam with routine gynecological exam    2. Screening mammogram, encounter for    3. Menopause    4. Pap smear for cervical cancer screening               Plan:      Routine follow up  Pap every 3 yrs  Mammogram yearly  Pt to call if desires to start on hrt

## 2018-04-14 ENCOUNTER — OFFICE VISIT (OUTPATIENT)
Dept: URGENT CARE | Facility: CLINIC | Age: 53
End: 2018-04-14
Payer: COMMERCIAL

## 2018-04-14 VITALS
HEART RATE: 69 BPM | OXYGEN SATURATION: 100 % | SYSTOLIC BLOOD PRESSURE: 122 MMHG | DIASTOLIC BLOOD PRESSURE: 85 MMHG | WEIGHT: 135 LBS | TEMPERATURE: 98 F | BODY MASS INDEX: 23.17 KG/M2

## 2018-04-14 DIAGNOSIS — S59.902A INJURY OF LEFT ELBOW, INITIAL ENCOUNTER: Primary | ICD-10-CM

## 2018-04-14 PROCEDURE — 99214 OFFICE O/P EST MOD 30 MIN: CPT | Mod: 25,S$GLB,, | Performed by: NURSE PRACTITIONER

## 2018-04-14 PROCEDURE — 96372 THER/PROPH/DIAG INJ SC/IM: CPT | Mod: S$GLB,,, | Performed by: EMERGENCY MEDICINE

## 2018-04-14 RX ORDER — KETOROLAC TROMETHAMINE 30 MG/ML
30 INJECTION, SOLUTION INTRAMUSCULAR; INTRAVENOUS ONCE
Status: COMPLETED | OUTPATIENT
Start: 2018-04-14 | End: 2018-04-14

## 2018-04-14 RX ADMIN — KETOROLAC TROMETHAMINE 30 MG: 30 INJECTION, SOLUTION INTRAMUSCULAR; INTRAVENOUS at 10:04

## 2018-04-14 NOTE — PATIENT INSTRUCTIONS
Elbow Bruise  You have a bruise (contusion) of your elbow. A bruise causes local pain, swelling, and sometimes bruising. There are no broken bones. This injury takes a few days to a few weeks to heal. You may be given a sling for comfort and arm support.  You may notice color changes over the skin. It may change from reddish to bluish to greenish or yellowish before the bruising fades. The skin will then go back to its normal color.  Home care  Follow these guidelines when caring for yourself at home.  · Keep your arm elevated to reduce pain and swelling. This is most important during the first 2 days (48 hours) after the injury.  · Put an ice pack on the injured area. Do this for 20 minutes every 1 to 2 hours the first day. You can make an ice pack by wrapping a plastic bag of ice in a thin towel. You should continue to use the ice pack 3 to 4 times a day for the next 2 days. Then use the ice pack as needed to ease pain and swelling.  · Dont use a heating pad.  · Dont stick a needle into the contusion or bruising to drain it.  · You may use acetaminophen or ibuprofen to control pain, unless another pain medicine was prescribed. If you have chronic liver or kidney disease, talk with your healthcare provider before using these medicines. Also talk with your provider if youve had a stomach ulcer or gastrointestinal bleeding.  · If a sling was provided, you may take it off to shower or bathe. Dont wear it for more than 1 week or it may cause joint stiffness.  Follow-up care  Follow up with your healthcare provider, or as advised, if you are not starting to get better within the next 3 days.  When to seek medical advice  Call your healthcare provider right away if any of these occur:  · Pain or swelling gets worse  · The back of your elbow becomes very swollen where it almost looks like a gold ball or egg-like mass is growing there. This is a sign of olecrenon bursitis or septic bursitis which may need  immediate treatment.  · Redness, red streaks down the arm, warmth, or drainage from the bruise  · Hand or fingers becomes cold, blue, numb, or tingly  · New bruises, and you dont know what caused them  · Contusion doesnt heal  Date Last Reviewed: 2/1/2017  © 2334-1520 [employer]. [address] [place] [address], [place] [id number]. All rights reserved. This information is not intended as a substitute for professional medical care. Always follow your healthcare professional's instructions.      R.I.C.E.    R.I.C.E. stands for Rest, Ice, Compression, and Elevation. Doing these things helps limit pain and swelling after an injury. R.I.C.E. also helps injuries heal faster. Use R.I.C.E. for sprains, strains, and severe bruises or bumps. Follow the tips on this handout and begin R.I.C.E. as soon as possible after an injury.  ? Rest  Pain is your bodys way of telling you to rest an injured area. Whether you have hurt an elbow, hand, foot, or knee, limiting its use will prevent further injury and help you heal.  ? Ice  Applying ice right after an injury helps prevent swelling and reduce pain. Dont place ice directly on your skin.  · Wrap a cold pack or bag of ice in a thin cloth. Place it over the injured area.  · Ice for 10 minutes every 3 hours. Dont ice for more than 20 minutes at a time.  ? Compression  Putting pressure (compression) on an injury helps prevent swelling and provides support.  · Wrap the injured area firmly with an elastic bandage. If your hand or foot tingles, becomes discolored, or feels cold to the touch, the bandage may be too tight. Rewrap it more loosely.  · If your bandage becomes too loose, rewrap it.  · Do not wear an elastic bandage overnight.  ? Elevation  Keeping an injury elevated helps reduce swelling, pain, and throbbing. Elevation is most effective when the injury is kept elevated higher than the heart.     Call your healthcare provider if you notice any of the  following:  · Fingers or toes feel numb, are cold to the touch, or change color  · Skin looks shiny or tight  · Pain, swelling, or bruising worsens and is not improved with elevation   Date Last Reviewed: 9/3/2015  © 1705-1746 [employer]. [address] [place] [id number]. All rights reserved. This information is not intended as a substitute for professional medical care. Always follow your healthcare professional's instructions.    -Follow up with PCP as discussed.   -If pain worsens or your unable to move elbow you may need to go to the Er.   -Rest, Ice, and Elevate your left elbow.   -Wear sling for the next week or until pain resolves.

## 2018-04-14 NOTE — PROGRESS NOTES
Subjective:       Patient ID: Grace Beltran is a 52 y.o. female.    Vitals:  weight is 61.2 kg (135 lb). Her temperature is 98.2 °F (36.8 °C). Her blood pressure is 122/85 and her pulse is 69. Her oxygen saturation is 100%.     Chief Complaint: Elbow Injury    Patient fell last Saturday and hit her L elbow and tailbone. Patient states that she has had a lot of swelling and very painful.     The patient complains of left elbow after a slip and fall last Sunday. The patient is concerned because the pain has not improved and the swelling is still there. The patient denies pain to the upper arm and lower left wrist. The patient does have point tenderness to the left elbow with a healed abrasion. The patient did initially bleed from the wound. Her Tet shot is UTD. She denies any recent fever or chills. She has full ROM in the left arm and elbow but with pain. She is instructed on the use of the left arm sling and also taking NSAIDS for the pain and inflammation. The patient has full sensation and movement in the left arm. Her left and right radial pulse is +2. She is neurovascularly intact.        Elbow Injury   This is a new problem. The current episode started in the past 7 days. The problem occurs constantly. The problem has been unchanged. Associated symptoms include joint swelling. Pertinent negatives include no abdominal pain, chest pain, neck pain, numbness or weakness. Nothing aggravates the symptoms. She has tried NSAIDs for the symptoms. The treatment provided mild relief.     Review of Systems   Constitution: Negative for weakness and malaise/fatigue.   HENT: Negative for nosebleeds.    Cardiovascular: Negative for chest pain and syncope.   Respiratory: Negative for shortness of breath.    Musculoskeletal: Positive for joint pain, joint swelling, muscle weakness and stiffness. Negative for back pain and neck pain.        Left elbow   Gastrointestinal: Negative for abdominal pain.   Genitourinary:  Negative for hematuria.   Neurological: Negative for dizziness and numbness.       Objective:      Physical Exam   Constitutional: She is oriented to person, place, and time. She appears well-developed and well-nourished. She is cooperative.  Non-toxic appearance. She does not appear ill. No distress.   HENT:   Head: Normocephalic and atraumatic. Head is without abrasion, without contusion and without laceration.   Right Ear: Hearing, tympanic membrane, external ear and ear canal normal. No hemotympanum.   Left Ear: Hearing, tympanic membrane, external ear and ear canal normal. No hemotympanum.   Nose: Nose normal. No mucosal edema, rhinorrhea or nasal deformity. No epistaxis. Right sinus exhibits no maxillary sinus tenderness and no frontal sinus tenderness. Left sinus exhibits no maxillary sinus tenderness and no frontal sinus tenderness.   Mouth/Throat: Uvula is midline, oropharynx is clear and moist and mucous membranes are normal. No trismus in the jaw. Normal dentition. No uvula swelling. No posterior oropharyngeal erythema.   Eyes: Conjunctivae, EOM and lids are normal. Pupils are equal, round, and reactive to light. Right eye exhibits no discharge. Left eye exhibits no discharge. No scleral icterus.   Sclera clear bilat   Neck: Trachea normal, normal range of motion, full passive range of motion without pain and phonation normal. Neck supple. No spinous process tenderness and no muscular tenderness present. No neck rigidity. No tracheal deviation present.   Cardiovascular: Normal rate, regular rhythm, normal heart sounds, intact distal pulses and normal pulses.    Pulses:       Radial pulses are 2+ on the right side, and 2+ on the left side.   Pulmonary/Chest: Effort normal and breath sounds normal. No respiratory distress.   Abdominal: Soft. Normal appearance and bowel sounds are normal. She exhibits no distension, no pulsatile midline mass and no mass. There is no tenderness.   Musculoskeletal: Normal  range of motion. She exhibits no edema or deformity.        Left upper arm: She exhibits no tenderness, no bony tenderness, no swelling, no edema, no deformity and no laceration.        Left forearm: She exhibits tenderness, bony tenderness, swelling and laceration (healed abrasion to the left elbow ).        Left hand: She exhibits normal range of motion, no tenderness, normal capillary refill, no deformity, no laceration and no swelling. Normal sensation noted. Normal strength noted.   Neurological: She is alert and oriented to person, place, and time. She has normal strength. No cranial nerve deficit or sensory deficit. She exhibits normal muscle tone. She displays no seizure activity. Coordination normal. GCS eye subscore is 4. GCS verbal subscore is 5. GCS motor subscore is 6.   Skin: Skin is warm, dry and intact. Capillary refill takes less than 2 seconds. No abrasion, no bruising, no burn, no ecchymosis and no laceration noted. She is not diaphoretic. No pallor.   Psychiatric: She has a normal mood and affect. Her speech is normal and behavior is normal. Judgment and thought content normal. Cognition and memory are normal.   Nursing note and vitals reviewed.            10:25 left elbow xray read by radiologist as negative.     Assessment:       1. Injury of left elbow, initial encounter        Plan:         Injury of left elbow, initial encounter  -     X-Ray Elbow Complete Left; Future; Expected date: 04/14/2018  -     E - OTHER  -     ketorolac injection 30 mg; Inject 1 mL (30 mg total) into the muscle once.          Elbow Bruise  You have a bruise (contusion) of your elbow. A bruise causes local pain, swelling, and sometimes bruising. There are no broken bones. This injury takes a few days to a few weeks to heal. You may be given a sling for comfort and arm support.  You may notice color changes over the skin. It may change from reddish to bluish to greenish or yellowish before the bruising fades. The  skin will then go back to its normal color.  Home care  Follow these guidelines when caring for yourself at home.  · Keep your arm elevated to reduce pain and swelling. This is most important during the first 2 days (48 hours) after the injury.  · Put an ice pack on the injured area. Do this for 20 minutes every 1 to 2 hours the first day. You can make an ice pack by wrapping a plastic bag of ice in a thin towel. You should continue to use the ice pack 3 to 4 times a day for the next 2 days. Then use the ice pack as needed to ease pain and swelling.  · Dont use a heating pad.  · Dont stick a needle into the contusion or bruising to drain it.  · You may use acetaminophen or ibuprofen to control pain, unless another pain medicine was prescribed. If you have chronic liver or kidney disease, talk with your healthcare provider before using these medicines. Also talk with your provider if youve had a stomach ulcer or gastrointestinal bleeding.  · If a sling was provided, you may take it off to shower or bathe. Dont wear it for more than 1 week or it may cause joint stiffness.  Follow-up care  Follow up with your healthcare provider, or as advised, if you are not starting to get better within the next 3 days.  When to seek medical advice  Call your healthcare provider right away if any of these occur:  · Pain or swelling gets worse  · The back of your elbow becomes very swollen where it almost looks like a gold ball or egg-like mass is growing there. This is a sign of olecrenon bursitis or septic bursitis which may need immediate treatment.  · Redness, red streaks down the arm, warmth, or drainage from the bruise  · Hand or fingers becomes cold, blue, numb, or tingly  · New bruises, and you dont know what caused them  · Contusion doesnt heal  Date Last Reviewed: 2/1/2017  © 6243-3246 [employer]. [address] [place] [address], [place] [address]. All rights reserved. This information is not intended as a  substitute for professional medical care. Always follow your healthcare professional's instructions.      R.I.C.E.    R.I.C.E. stands for Rest, Ice, Compression, and Elevation. Doing these things helps limit pain and swelling after an injury. R.I.C.E. also helps injuries heal faster. Use R.I.C.E. for sprains, strains, and severe bruises or bumps. Follow the tips on this handout and begin R.I.C.E. as soon as possible after an injury.  ? Rest  Pain is your bodys way of telling you to rest an injured area. Whether you have hurt an elbow, hand, foot, or knee, limiting its use will prevent further injury and help you heal.  ? Ice  Applying ice right after an injury helps prevent swelling and reduce pain. Dont place ice directly on your skin.  · Wrap a cold pack or bag of ice in a thin cloth. Place it over the injured area.  · Ice for 10 minutes every 3 hours. Dont ice for more than 20 minutes at a time.  ? Compression  Putting pressure (compression) on an injury helps prevent swelling and provides support.  · Wrap the injured area firmly with an elastic bandage. If your hand or foot tingles, becomes discolored, or feels cold to the touch, the bandage may be too tight. Rewrap it more loosely.  · If your bandage becomes too loose, rewrap it.  · Do not wear an elastic bandage overnight.  ? Elevation  Keeping an injury elevated helps reduce swelling, pain, and throbbing. Elevation is most effective when the injury is kept elevated higher than the heart.     Call your healthcare provider if you notice any of the following:  · Fingers or toes feel numb, are cold to the touch, or change color  · Skin looks shiny or tight  · Pain, swelling, or bruising worsens and is not improved with elevation   Date Last Reviewed: 9/3/2015  © 4408-7843 [employer]. [address] [place] [address], [place] [address]. All rights reserved. This information is not intended as a substitute for professional medical care. Always follow your  healthcare professional's instructions.    -Follow up with PCP as discussed.   -If pain worsens or your unable to move elbow you may need to go to the Er.   -Rest, Ice, and Elevate your left elbow.   -Take NSAIDs as needed for the pain.   -Wear sling for the next week or until pain resolves.

## 2018-04-16 ENCOUNTER — HOSPITAL ENCOUNTER (OUTPATIENT)
Dept: RADIOLOGY | Facility: OTHER | Age: 53
Discharge: HOME | End: 2018-04-16
Attending: OBSTETRICS & GYNECOLOGY
Payer: COMMERCIAL

## 2018-04-16 DIAGNOSIS — Z12.31 SCREENING MAMMOGRAM, ENCOUNTER FOR: ICD-10-CM

## 2018-04-16 PROCEDURE — 77063 BREAST TOMOSYNTHESIS BI: CPT | Mod: 26,,, | Performed by: RADIOLOGY

## 2018-04-16 PROCEDURE — 77067 SCR MAMMO BI INCL CAD: CPT | Mod: 26,,, | Performed by: RADIOLOGY

## 2018-04-16 PROCEDURE — 77067 SCR MAMMO BI INCL CAD: CPT | Mod: TC

## 2018-04-17 ENCOUNTER — TELEPHONE (OUTPATIENT)
Dept: URGENT CARE | Facility: CLINIC | Age: 53
End: 2018-04-17

## 2018-04-19 ENCOUNTER — HOSPITAL ENCOUNTER (OUTPATIENT)
Dept: RADIOLOGY | Facility: HOSPITAL | Age: 53
Discharge: HOME | End: 2018-04-19
Attending: INTERNAL MEDICINE
Payer: COMMERCIAL

## 2018-04-19 ENCOUNTER — OFFICE VISIT (OUTPATIENT)
Dept: INTERNAL MEDICINE | Facility: CLINIC | Age: 53
End: 2018-04-19
Payer: COMMERCIAL

## 2018-04-19 VITALS
OXYGEN SATURATION: 96 % | WEIGHT: 144.81 LBS | HEART RATE: 84 BPM | BODY MASS INDEX: 24.72 KG/M2 | SYSTOLIC BLOOD PRESSURE: 124 MMHG | TEMPERATURE: 98 F | HEIGHT: 64 IN | DIASTOLIC BLOOD PRESSURE: 86 MMHG | RESPIRATION RATE: 16 BRPM

## 2018-04-19 DIAGNOSIS — S39.92XA TAILBONE INJURY, INITIAL ENCOUNTER: ICD-10-CM

## 2018-04-19 DIAGNOSIS — S39.92XA TAILBONE INJURY, INITIAL ENCOUNTER: Primary | ICD-10-CM

## 2018-04-19 PROCEDURE — 99999 PR PBB SHADOW E&M-EST. PATIENT-LVL III: CPT | Mod: PBBFAC,,, | Performed by: INTERNAL MEDICINE

## 2018-04-19 PROCEDURE — 72220 X-RAY EXAM SACRUM TAILBONE: CPT | Mod: 26,,, | Performed by: RADIOLOGY

## 2018-04-19 PROCEDURE — 99213 OFFICE O/P EST LOW 20 MIN: CPT | Mod: S$GLB,,, | Performed by: INTERNAL MEDICINE

## 2018-04-19 PROCEDURE — 72220 X-RAY EXAM SACRUM TAILBONE: CPT | Mod: TC,PO

## 2018-04-19 RX ORDER — NAPROXEN 500 MG/1
500 TABLET ORAL 2 TIMES DAILY PRN
Qty: 30 TABLET | Refills: 0 | Status: SHIPPED | OUTPATIENT
Start: 2018-04-19 | End: 2018-06-20

## 2018-04-19 NOTE — PROGRESS NOTES
"Subjective:       Patient ID: Grace Beltran is a 52 y.o. female.    Chief Complaint: Fall (fell approx 1 week ago; pt experiencing tailbone pain )    HPI    Pt slipped on tile floor and fell onto bottom about one week ago. She also hit left elbow, which she had evaluated with x-ray which was normal. Her tailbone didn't hurt immediately following, but started with pain a few days later. She still has pain, especially with sitting. There is no back pain. No problems walking or with BMs. She purchased a donut to sit on online and will receive it tomorrow.     Review of Systems   Gastrointestinal: Negative for blood in stool and rectal pain.   Genitourinary: Negative for difficulty urinating.   Musculoskeletal: Positive for arthralgias. Negative for gait problem.   Skin: Negative for wound.   Neurological: Negative for weakness and numbness.       Objective:        Vitals:    04/19/18 1129   BP: 124/86   BP Location: Left arm   Patient Position: Sitting   BP Method: Large (Manual)   Pulse: 84   Resp: 16   Temp: 98.2 °F (36.8 °C)   TempSrc: Oral   SpO2: 96%   Weight: 65.7 kg (144 lb 13.5 oz)   Height: 5' 4" (1.626 m)       Body mass index is 24.86 kg/m².    Physical Exam   Constitutional: She is oriented to person, place, and time. She appears well-developed and well-nourished. No distress.   HENT:   Head: Normocephalic and atraumatic.   Right Ear: External ear normal.   Left Ear: External ear normal.   Eyes: Conjunctivae and EOM are normal.   Neck: Normal range of motion.   Cardiovascular: Normal rate and intact distal pulses.    Pulmonary/Chest: Effort normal.   Musculoskeletal: Normal range of motion.        Lumbar back: She exhibits normal range of motion, no tenderness and no bony tenderness.   Neurological: She is alert and oriented to person, place, and time.   Skin: Skin is warm and dry. No rash noted.   Psychiatric: She has a normal mood and affect. Her behavior is normal.       Assessment:     1. Tailbone " injury, initial encounter           Plan:         1. Tailbone injury, initial encounter  - work excuse provided for standing desk   - X-Ray Sacrum And Coccyx; Future  - naproxen (NAPROSYN) 500 MG tablet; Take 1 tablet (500 mg total) by mouth 2 (two) times daily as needed (pain).  Dispense: 30 tablet; Refill: 0

## 2018-06-13 ENCOUNTER — LAB VISIT (OUTPATIENT)
Dept: LAB | Facility: HOSPITAL | Age: 53
End: 2018-06-13
Attending: FAMILY MEDICINE
Payer: COMMERCIAL

## 2018-06-13 DIAGNOSIS — Z00.00 ANNUAL PHYSICAL EXAM: ICD-10-CM

## 2018-06-13 LAB
ALBUMIN SERPL BCP-MCNC: 3.7 G/DL
ALP SERPL-CCNC: 96 U/L
ALT SERPL W/O P-5'-P-CCNC: 23 U/L
ANION GAP SERPL CALC-SCNC: 8 MMOL/L
AST SERPL-CCNC: 20 U/L
BASOPHILS # BLD AUTO: 0.02 K/UL
BASOPHILS NFR BLD: 0.5 %
BILIRUB SERPL-MCNC: 0.5 MG/DL
BUN SERPL-MCNC: 12 MG/DL
CALCIUM SERPL-MCNC: 9.2 MG/DL
CHLORIDE SERPL-SCNC: 105 MMOL/L
CHOLEST SERPL-MCNC: 220 MG/DL
CHOLEST/HDLC SERPL: 3.1 {RATIO}
CO2 SERPL-SCNC: 28 MMOL/L
CREAT SERPL-MCNC: 0.8 MG/DL
DIFFERENTIAL METHOD: NORMAL
EOSINOPHIL # BLD AUTO: 0.1 K/UL
EOSINOPHIL NFR BLD: 1.6 %
ERYTHROCYTE [DISTWIDTH] IN BLOOD BY AUTOMATED COUNT: 12.4 %
EST. GFR  (AFRICAN AMERICAN): >60 ML/MIN/1.73 M^2
EST. GFR  (NON AFRICAN AMERICAN): >60 ML/MIN/1.73 M^2
GLUCOSE SERPL-MCNC: 89 MG/DL
HCT VFR BLD AUTO: 40.7 %
HDLC SERPL-MCNC: 70 MG/DL
HDLC SERPL: 31.8 %
HGB BLD-MCNC: 13.5 G/DL
IMM GRANULOCYTES # BLD AUTO: 0.01 K/UL
IMM GRANULOCYTES NFR BLD AUTO: 0.2 %
LDLC SERPL CALC-MCNC: 131.8 MG/DL
LYMPHOCYTES # BLD AUTO: 1.3 K/UL
LYMPHOCYTES NFR BLD: 29.1 %
MCH RBC QN AUTO: 30.3 PG
MCHC RBC AUTO-ENTMCNC: 33.2 G/DL
MCV RBC AUTO: 91 FL
MONOCYTES # BLD AUTO: 0.3 K/UL
MONOCYTES NFR BLD: 6.8 %
NEUTROPHILS # BLD AUTO: 2.8 K/UL
NEUTROPHILS NFR BLD: 61.8 %
NONHDLC SERPL-MCNC: 150 MG/DL
NRBC BLD-RTO: 0 /100 WBC
PLATELET # BLD AUTO: 278 K/UL
PMV BLD AUTO: 10.4 FL
POTASSIUM SERPL-SCNC: 4.1 MMOL/L
PROT SERPL-MCNC: 6.8 G/DL
RBC # BLD AUTO: 4.46 M/UL
SODIUM SERPL-SCNC: 141 MMOL/L
TRIGL SERPL-MCNC: 91 MG/DL
WBC # BLD AUTO: 4.44 K/UL

## 2018-06-13 PROCEDURE — 80061 LIPID PANEL: CPT

## 2018-06-13 PROCEDURE — 85025 COMPLETE CBC W/AUTO DIFF WBC: CPT

## 2018-06-13 PROCEDURE — 80053 COMPREHEN METABOLIC PANEL: CPT

## 2018-06-13 PROCEDURE — 36415 COLL VENOUS BLD VENIPUNCTURE: CPT | Mod: PO

## 2018-06-20 ENCOUNTER — OFFICE VISIT (OUTPATIENT)
Dept: FAMILY MEDICINE | Facility: CLINIC | Age: 53
End: 2018-06-20
Payer: COMMERCIAL

## 2018-06-20 VITALS
TEMPERATURE: 98 F | HEIGHT: 64 IN | RESPIRATION RATE: 16 BRPM | WEIGHT: 146.19 LBS | BODY MASS INDEX: 24.96 KG/M2 | SYSTOLIC BLOOD PRESSURE: 90 MMHG | DIASTOLIC BLOOD PRESSURE: 70 MMHG

## 2018-06-20 DIAGNOSIS — K59.09 CHRONIC CONSTIPATION: ICD-10-CM

## 2018-06-20 DIAGNOSIS — Z12.11 SCREEN FOR COLON CANCER: ICD-10-CM

## 2018-06-20 DIAGNOSIS — Z00.00 ANNUAL PHYSICAL EXAM: Primary | ICD-10-CM

## 2018-06-20 DIAGNOSIS — F41.9 ANXIETY: ICD-10-CM

## 2018-06-20 PROCEDURE — 99396 PREV VISIT EST AGE 40-64: CPT | Mod: S$GLB,,, | Performed by: FAMILY MEDICINE

## 2018-06-20 PROCEDURE — 99999 PR PBB SHADOW E&M-EST. PATIENT-LVL III: CPT | Mod: PBBFAC,,, | Performed by: FAMILY MEDICINE

## 2018-06-20 RX ORDER — CITALOPRAM 40 MG/1
40 TABLET, FILM COATED ORAL DAILY
Qty: 90 TABLET | Refills: 3 | Status: SHIPPED | OUTPATIENT
Start: 2018-06-20 | End: 2018-06-29 | Stop reason: SDUPTHER

## 2018-06-20 NOTE — PROGRESS NOTES
Subjective:      Patient ID: Grace Beltran is a 52 y.o. female.    Chief Complaint: Annual Exam    Here today for general exam.     She does have chronic constipation & GI recommended colonoscopy 2017.  She had Salmonella colitis after eating mangoes in [place] in the fall, this finding resolved.  She is ready to get colonoscopy.    She has gained weight, not exercising.  Stand up desk at work is helping, since she injured her tailbone last year.    Citalopram 40 mg working well for anxiety.  She would like refill.    Denies any chest pain, shortness of breath, nausea vomiting diarrhea, blood in stool, heartburn    The 10-year ASCVD risk score (Debbie ANN Jr., et al., 2013) is: 0.7%    Values used to calculate the score:      Age: 52 years      Sex: Female      Is Non- : No      Diabetic: No      Tobacco smoker: No      Systolic Blood Pressure: 90 mmHg      Is BP treated: No      HDL Cholesterol: 70 mg/dL      Total Cholesterol: 220 mg/dL      No results found for: HGBA1C  No results found for: MICALBCREAT  Lab Results   Component Value Date    LDLCALC 131.8 06/13/2018    LDLCALC 127.2 05/18/2017    CHOL 220 (H) 06/13/2018    HDL 70 06/13/2018    TRIG 91 06/13/2018       Lab Results   Component Value Date     06/13/2018    K 4.1 06/13/2018     06/13/2018    CO2 28 06/13/2018    GLU 89 06/13/2018    BUN 12 06/13/2018    CREATININE 0.8 06/13/2018    CALCIUM 9.2 06/13/2018    PROT 6.8 06/13/2018    ALBUMIN 3.7 06/13/2018    BILITOT 0.5 06/13/2018    ALKPHOS 96 06/13/2018    AST 20 06/13/2018    ALT 23 06/13/2018    ANIONGAP 8 06/13/2018    ESTGFRAFRICA >60.0 06/13/2018    EGFRNONAA >60.0 06/13/2018    WBC 4.44 06/13/2018    HGB 13.5 06/13/2018    HCT 40.7 06/13/2018    MCV 91 06/13/2018     06/13/2018    TSH 1.077 05/18/2017    [id number] 18 (L) 12/17/2014         Current Outpatient Prescriptions on File Prior to Visit   Medication Sig    [DISCONTINUED] citalopram  "(CELEXA) 40 MG tablet Take 1 tablet (40 mg total) by mouth once daily.    [DISCONTINUED] naproxen (NAPROSYN) 500 MG tablet Take 1 tablet (500 mg total) by mouth 2 (two) times daily as needed (pain).    [DISCONTINUED] AFLURIA 7437-1720, PF, 45 mcg (15 mcg x 3)/0.5 mL Syrg inject 0.5 milliliter intramuscularly     No current facility-administered medications on file prior to visit.      Past Medical History:   Diagnosis Date    Acne     Dermatologist prescribes spironolactone     Anxiety     celexa 40    Chronic constipation 5/24/2017    Hot flashes due to menopause 5/24/2017    Osteopenia of spine 5/24/2017    Primary osteoarthritis of left hip 05/24/2017    [facility], MRI 2016, Injection didn't help much    Right elbow pain 07/20/2017    dry needling at [facility]     Past Surgical History:   Procedure Laterality Date    right hip surgery      April 24, 2012     Social History     Social History Narrative     with EMERITA (works with HIV, Hepatitis C), female partner    2 daughters  - one is a competitive cheerleader with [employer]), 1 son (Yves)    Nonsmoker, ETOH 4 x / week    GYN [facility], never had colonoscopy     Family History   Problem Relation Age of Onset    Hypertension Mother     Hyperlipidemia Mother     Osteoporosis Mother     Breast cancer Maternal Aunt 65     Vitals:    06/20/18 1409   BP: 90/70   Resp: 16   Temp: 98.1 °F (36.7 °C)   Weight: 66.3 kg (146 lb 2.6 oz)   Height: 5' 4" (1.626 m)     Objective:   Physical Exam   Constitutional: She is oriented to person, place, and time. She appears well-developed and well-nourished.   HENT:   Head: Normocephalic and atraumatic.   Right Ear: External ear normal.   Left Ear: External ear normal.   Nose: Nose normal.   Mouth/Throat: Oropharynx is clear and moist.   Eyes: EOM are normal. Pupils are equal, round, and reactive to light.   Neck: Neck supple. No thyromegaly present.   Cardiovascular: " Normal rate, regular rhythm, normal heart sounds and intact distal pulses.    No murmur heard.  Pulmonary/Chest: Effort normal and breath sounds normal. She has no wheezes.   Abdominal: Soft. Bowel sounds are normal. She exhibits no distension and no mass. There is no tenderness. There is no rebound and no guarding.   Musculoskeletal: She exhibits no edema.   Lymphadenopathy:     She has no cervical adenopathy.   Neurological: She is alert and oriented to person, place, and time.   Skin: Skin is warm and dry.   Psychiatric: She has a normal mood and affect. Her behavior is normal.     Assessment:     1. Annual physical exam    2. Anxiety    3. Chronic constipation    4. Screen for colon cancer      Plan:     Orders Placed This Encounter    citalopram (CELEXA) 40 MG tablet    Case request GI: COLONOSCOPY     --------------------------  WATER BALANCE-  Your body systems work best with 64 ounces DAILY  (equal to 8 cups or a HALF A GALLON DAILY)   - to flush out impurities & cleanse our organs.   For every 8 ounces of caffeine you drink, ADD ANOTHER CUP of water to your daily water needs. (2 cups of coffee and one diet coke = you need 11 glasses of water a day). We were not made to drink sugary drinks  - not even artificial sweeteners.   You'll notice a difference in your brain function, energy level & overall wellness. Your liver & kidney filters will thank you too for keeping them properly cleansed  ---------------------------      Patient Instructions   Due for  Colonoscopy    ===============    PLEASE CALL [phone] TO DISCUSS SCHEDULING YOUR COLONOSCOPY     They will also discuss the different cleanses to take the night before your colonoscopy, so that the physician can effectively view the lining of your colon.     =======================  Follow with GYN    RECOMMENDATIONS FOR FEMALES    Your #1 MEDICINE is DAILY EXERCISE - 15-20 minutes of huffing & puffing EVERY DAY.     Prevent the #1 cause of death-  cardiovascular disease (HEART ATTACK & STROKE) by checking for normal blood pressure, cholesterol, sugars, & by not smoking.     VACCINES: Yearly FLU shot, ONE PNEUMONIA shot after 65,  SHINGLES shot after 60    Screening colonoscopy at AGE  50 & every 10 years to check for COLON CANCER,  one of the most common & preventable cancers. Or FIT z12.11, Repeat in 3 years if POLYP found     I recommend  high fiber (5 fresh fruits or vegetables daily), low fat diet and aerobic  exercise (huffing/ puffing/ sweating for 20 min straight at least 4 days a week)    Follow up yearly with mammogram (every 2 years) , fasting lipids, CMP, CBC, TSH prior.   ==============================================================

## 2018-06-20 NOTE — PATIENT INSTRUCTIONS
Due for  Colonoscopy    ===============    PLEASE CALL [phone] TO DISCUSS SCHEDULING YOUR COLONOSCOPY     They will also discuss the different cleanses to take the night before your colonoscopy, so that the physician can effectively view the lining of your colon.     =======================  Follow with GYN    RECOMMENDATIONS FOR FEMALES    Your #1 MEDICINE is DAILY EXERCISE - 15-20 minutes of huffing & puffing EVERY DAY.     Prevent the #1 cause of death- cardiovascular disease (HEART ATTACK & STROKE) by checking for normal blood pressure, cholesterol, sugars, & by not smoking.     VACCINES: Yearly FLU shot, ONE PNEUMONIA shot after 65,  SHINGLES shot after 60    Screening colonoscopy at AGE  50 & every 10 years to check for COLON CANCER,  one of the most common & preventable cancers. Or FIT z12.11, Repeat in 3 years if POLYP found     I recommend  high fiber (5 fresh fruits or vegetables daily), low fat diet and aerobic  exercise (huffing/ puffing/ sweating for 20 min straight at least 4 days a week)    Follow up yearly with mammogram (every 2 years) , fasting lipids, CMP, CBC, TSH prior.   ==============================================================

## 2018-06-29 RX ORDER — CITALOPRAM 40 MG/1
TABLET, FILM COATED ORAL
Qty: 90 TABLET | Refills: 3 | Status: SHIPPED | OUTPATIENT
Start: 2018-06-29 | End: 2019-07-10 | Stop reason: SDUPTHER

## 2018-10-04 ENCOUNTER — OFFICE VISIT (OUTPATIENT)
Dept: URGENT CARE | Facility: CLINIC | Age: 53
End: 2018-10-04
Payer: COMMERCIAL

## 2018-10-04 ENCOUNTER — TELEPHONE (OUTPATIENT)
Dept: SPORTS MEDICINE | Facility: CLINIC | Age: 53
End: 2018-10-04

## 2018-10-04 VITALS
RESPIRATION RATE: 18 BRPM | HEART RATE: 85 BPM | TEMPERATURE: 99 F | WEIGHT: 140 LBS | DIASTOLIC BLOOD PRESSURE: 82 MMHG | OXYGEN SATURATION: 95 % | HEIGHT: 64 IN | SYSTOLIC BLOOD PRESSURE: 137 MMHG | BODY MASS INDEX: 23.9 KG/M2

## 2018-10-04 DIAGNOSIS — S82.034A CLOSED NONDISPLACED TRANSVERSE FRACTURE OF RIGHT PATELLA, INITIAL ENCOUNTER: Primary | ICD-10-CM

## 2018-10-04 DIAGNOSIS — M25.561 ACUTE PAIN OF RIGHT KNEE: ICD-10-CM
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
## 2018-10-04 PROCEDURE — 73562 X-RAY EXAM OF KNEE 3: CPT | Mod: RT,S$GLB,, | Performed by: RADIOLOGY

## 2018-10-04 PROCEDURE — 99214 OFFICE O/P EST MOD 30 MIN: CPT | Mod: 25,S$GLB,, | Performed by: NURSE PRACTITIONER

## 2018-10-04 PROCEDURE — 96372 THER/PROPH/DIAG INJ SC/IM: CPT | Mod: S$GLB,,, | Performed by: FAMILY MEDICINE

## 2018-10-04 PROCEDURE — 3008F BODY MASS INDEX DOCD: CPT | Mod: CPTII,S$GLB,, | Performed by: NURSE PRACTITIONER

## 2018-10-04 RX ORDER — KETOROLAC TROMETHAMINE 30 MG/ML
30 INJECTION, SOLUTION INTRAMUSCULAR; INTRAVENOUS
Status: COMPLETED | OUTPATIENT
Start: 2018-10-04 | End: 2018-10-04

## 2018-10-04 RX ADMIN — KETOROLAC TROMETHAMINE 30 MG: 30 INJECTION, SOLUTION INTRAMUSCULAR; INTRAVENOUS at 10:10

## 2018-10-04 NOTE — TELEPHONE ENCOUNTER
Ortho Telephone Triage Message  [phone]  Patient C/O: R patellar fracture s/p slip and fall at home this morning and seen at [facility]. Brace placed and pt is requesting Ortho follow up appt per [facility] referral.  HX: transverse fracture of R patella with associated hemarthorosis  10/4/18  Triage Advice: Maintain brace. Rest, ice, elevate. Pt states will take ibuprofen for pain until seen by Orthopedics.   Resolution: Appt scheduled tomorrow, per pt's preference, with EVELYN Hutchins PA-C/Sports Medicine at 8:30am with arrival at 8:00am. Pt confirms time and location of appt.

## 2018-10-04 NOTE — LETTER
October 4, 2018      [facility] [address] [place] [address]  Phone: [phone]  Fax: [phone]       Patient: Grace Beltran   YOB: 1965  Date of Visit: 10/04/2018    To Whom It May Concern:    Moe Beltran  was at [facility] on 10/04/2018. She may return to work/school on 10/08/2018. with no restrictions. If you have any questions or concerns, or if I can be of further assistance, please do not hesitate to contact me.    Sincerely,              Amber Lee MA

## 2018-10-04 NOTE — PROGRESS NOTES
"Subjective:       Patient ID: Grace Beltran is a 53 y.o. female.    Vitals:  height is 5' 4" (1.626 m) and weight is 63.5 kg (140 lb). Her temperature is 98.9 °F (37.2 °C). Her blood pressure is 137/82 and her pulse is 85. Her respiration is 18 and oxygen saturation is 95%.     Chief Complaint: Knee Pain    Patient fell twice on her right knee today. First pt slipped in dog's urine and then pt slipped using crutches because they were too tall for her. Pt has history of osteopenia. Pt repots inability to bear weight and pain to patellar region with swelling.       Knee Pain    The incident occurred 1 to 3 hours ago. The incident occurred at home. The injury mechanism was a fall. The pain is present in the right knee. The quality of the pain is described as shooting (throbbing). The pain is at a severity of 7/10. The pain is moderate. The pain has been constant since onset. She reports no foreign bodies present. The symptoms are aggravated by weight bearing and movement. She has tried nothing for the symptoms. The treatment provided no relief.     Review of Systems   Constitution: Negative for chills and fever.   HENT: Negative for sore throat.    Eyes: Negative for blurred vision.   Cardiovascular: Negative for chest pain.   Respiratory: Negative for shortness of breath.    Skin: Negative for rash.   Musculoskeletal: Positive for joint pain and joint swelling. Negative for back pain.   Gastrointestinal: Negative for abdominal pain, diarrhea, nausea and vomiting.   Neurological: Negative for headaches.   Psychiatric/Behavioral: The patient is not nervous/anxious.        Objective:      Physical Exam   Constitutional: She is oriented to person, place, and time. She appears well-developed and well-nourished. She is cooperative.  Non-toxic appearance. She does not appear ill. No distress.   HENT:   Head: Normocephalic and atraumatic. Head is without abrasion, without contusion and without laceration.   Right Ear: " Hearing, tympanic membrane, external ear and ear canal normal. No hemotympanum.   Left Ear: Hearing, tympanic membrane, external ear and ear canal normal. No hemotympanum.   Nose: Nose normal. No mucosal edema, rhinorrhea or nasal deformity. No epistaxis. Right sinus exhibits no maxillary sinus tenderness and no frontal sinus tenderness. Left sinus exhibits no maxillary sinus tenderness and no frontal sinus tenderness.   Mouth/Throat: Uvula is midline, oropharynx is clear and moist and mucous membranes are normal. No trismus in the jaw. Normal dentition. No uvula swelling. No posterior oropharyngeal erythema.   Eyes: Conjunctivae, EOM and lids are normal. Pupils are equal, round, and reactive to light. Right eye exhibits no discharge. Left eye exhibits no discharge. No scleral icterus.   Sclera clear bilat   Neck: Trachea normal, normal range of motion, full passive range of motion without pain and phonation normal. Neck supple. No spinous process tenderness and no muscular tenderness present. No neck rigidity. No tracheal deviation present.   Cardiovascular: Normal rate, regular rhythm, normal heart sounds, intact distal pulses and normal pulses.   Pulmonary/Chest: Effort normal and breath sounds normal. No respiratory distress.   Abdominal: Soft. Normal appearance and bowel sounds are normal. She exhibits no distension, no pulsatile midline mass and no mass. There is no tenderness.   Musculoskeletal: She exhibits no edema or deformity.        Right knee: She exhibits decreased range of motion, swelling, abnormal patellar mobility and bony tenderness. She exhibits no effusion, no ecchymosis, no deformity, no laceration, no erythema, normal alignment, no LCL laxity, normal meniscus and no MCL laxity. Tenderness found. Patellar tendon tenderness noted.        Legs:  Neurological: She is alert and oriented to person, place, and time. She has normal strength. No cranial nerve deficit or sensory deficit. She exhibits  normal muscle tone. She displays no seizure activity. Coordination normal. GCS eye subscore is 4. GCS verbal subscore is 5. GCS motor subscore is 6.   Skin: Skin is warm, dry and intact. Capillary refill takes less than 2 seconds. No abrasion, no bruising, no burn, no ecchymosis and no laceration noted. She is not diaphoretic. No pallor.   Psychiatric: She has a normal mood and affect. Her speech is normal and behavior is normal. Judgment and thought content normal. Cognition and memory are normal.   Nursing note and vitals reviewed.      Xr Knee 3 View Right    Result Date: 10/4/2018  EXAMINATION: XR KNEE 3 VIEW RIGHT CLINICAL HISTORY: Pain in right knee TECHNIQUE: AP, lateral, and Merchant views of the right knee were performed. COMPARISON: None FINDINGS: Bones are fairly well mineralized.  There is a transverse fracture of the patella with associated hemarthrosis.  Remainder of the bones are intact.     Patellar fracture with hemarthrosis. Electronically signed by: Bienvenido Bowling MD Date:    10/04/2018 Time:    10:32  Assessment:       1. Acute pain of right knee    2. Fall, initial encounter    3. Other closed fracture of right patella, initial encounter        Plan:         Acute pain of right knee  -     XR KNEE 3 VIEW RIGHT    Fall, initial encounter    Other closed fracture of right patella, initial encounter  -     Ambulatory referral to Orthopedics  -     ketorolac injection 30 mg; Inject 1 mL (30 mg total) into the muscle one time.  -     IMMOBILIZER FOR HOME USE    Pt cannot tolerate any pain medication and does not want any. Pt given strict instruction on non weight bearing and immobilizing knee until following up with ortho  Patient Instructions         Keep immobilizer on at all times and no weight bearing until seeing ortho    Call [phone] to set up apt with ortho      Knee Pain  Knee pain is very common. Its especially common in active people who put a lot of pressure on their knees, like runners.  It affects women more often than men.  Your kneecap (patella) is a thick, round bone. It covers and protects the front portion of your knee joint. It moves along a groove in your thighbone (femur) as part of the patellofemoral joint. A layer of cartilage surrounds the underside of your kneecap. This layer protects it from grinding against your femur.  When this cartilage softens and breaks down, it can cause knee pain. This is partly because of repetitive stress. The stress irritates the lining of the joint. This causes pain in the underlying bone.  What causes knee pain?  Many things can cause knee pain. You may have more than one cause. Some of these include:  · Overuse of the knee joint  · The kneecap doesnt line up with the tissue around it  · Damage to small nerves in the area  · Damage to the ligament-like structure that holds the kneecap in place (retinaculum)  · Breakdown of the bone under the cartilage  · Swelling in the soft tissues around the kneecap  · Injury  You might be more likely to have knee pain if you:  · Exercise a lot  · Recently increased the intensity of your workouts  · Have a body mass index (BMI) greater than 25  · Have poor alignment of your kneecap  · Walk with your feet turned overly outward or inward  · Have weakness in surrounding muscle groups (inner quad or hip adductor muscles)  · Have too much tightness in surrounding muscle groups (hamstrings or iliotibial band)  · Have a recent history of injury to the area  · Are female  Symptoms of knee pain  This type of knee pain is a dull, aching pain in the front of the knee in the area under and around the kneecap. This pain may start quickly or slowly. Your pain might be worse when you squat, run, or sit for a long time. You might also sometimes feel like your knee is giving out. You may have symptoms in one or both of your knees.  Diagnosing knee pain  Your healthcare provider will ask about your medical history and your symptoms. Be  sure to describe any activities that make your knee pain worse. He or she will look at your knee. This will include tests of your range of motion, strength, and areas of pain of your knee. Your knee alignment will be checked.  Your healthcare provider will need to rule out other causes of your knee pain, such as arthritis. You may need an imaging test, such as an X-ray or MRI.  Treatment for knee pain  Treatments that can help ease your symptoms may include:  · Avoiding activities for a while that make your pain worse, returning to activity over time  · Icing the outside of your knee when it causes you pain  · Taking over-the-counter pain medicine  · Wearing a knee brace or taping your knee to support it  · Wearing special shoe inserts to help keep your feet in the proper alignment  · Doing special exercises to stretch and strengthen the muscles around your hip and your knee  These steps help most people manage knee pain. But some cases of knee pain need to be treated with surgery. You may need surgery right away. Or you may need it later if other treatments dont work. Your healthcare provider may refer you to an orthopedic surgeon. He or she will talk with you about your choices.  Preventing knee pain  Losing weight and correcting excess muscle tightness or muscle weakness may help lower your risk.  In some cases, you can prevent knee pain. To help prevent a flare-up of knee pain, you do these things:  · Regularly do all the exercises your doctor or physical therapist advises  · Support your knee as advised by your doctor or physical therapist  · Increase training gradually, and ease up on training when needed  · Have an expert check your gait for running or other sporting activities  · Stretch properly before and after exercise  · Replace your running shoes regularly  · Lose excess weight     When to call your healthcare provider  Call your healthcare provider right away if:  · Your symptoms dont get better  after a few weeks of treatment  · You have any new symptoms   Date Last Reviewed: 4/1/2017  © 2829-5024 [employer]. [address] [place] [address], [place] [address]. All rights reserved. This information is not intended as a substitute for professional medical care. Always follow your healthcare professional's instructions.        Knee Fracture    You have a break, or fracture, of the knee joint. This causes pain, swelling, and sometimes bruising.  This type of fracture is treated with a splint, cast, or knee brace, also called an immobilizer. It will take about 4 to 6 weeks for the fracture to heal. But it may take much longer for you to fully recover and go back to all your activities. Surgery may be needed to fix severe injuries.     Home care  · You will be given a splint, cast, or knee brace to prevent your knee joint from moving. Use crutches or a walker, unless you were told otherwise. Dont bear weight on your injured leg until your provider says its OK to do so. Crutches and walkers can be rented at many pharmacies and surgical or orthopedic supply stores.  · Keep your leg raised, or elevated, to reduce pain and swelling. When sleeping, place a pillow under your injured leg. When sitting, support your injured leg so it is level with your waist. This is very important during the first 48 hours.  · Apply an ice pack over the injured area for no more than 15 to 20 minutes. Do this every 1 to 2 hours for the first 24 to 48 hours. Keep using ice packs as needed to ease pain and swelling.  · To make an ice pack, put ice cubes in a sealed zip-lock plastic bag wrapped in a clean, thin towel or cloth. Never put ice or an ice pack directly on your skin. The ice pack can be put right on the cast, splint, or brace. As the ice melts, be careful that the cast, splint, or brace doesnt get wet.  · If you have a hook-and-loop closure knee brace, and your healthcare provider approves, open the brace to put the ice  pack directly on your knee. Wrap the ice pack in a clean, thin towel or cloth. Be careful not to move your knee.   · Keep the cast, splint, or brace dry at all times. Bathe with your cast, splint, or brace out of the water. Protect it with 2 large plastic bags. Place 1 bag around the other. Tape each bag with duct tape at the top end. Water can still leak in. So it's best to keep the cast, splint, or brace away from water. If a fiberglass splint or cast gets wet, dry it with a hair dryer on a cool setting.  · You may use over-the-counter pain medicine to ease pain, unless another pain medicine was prescribed. Always talk with your provider before using these medicines if you have chronic liver or kidney disease, or ever had a stomach ulcer or GI (gastrointestinal) bleeding.      Follow-up care  Follow up with your healthcare provider within 1 week, or as advised. This is to be sure the bone is healing properly.  If any X-rays were taken, you will be told of any new findings that may affect your care.     When to seek medical advice  Call your healthcare provider right away if any of the following occur:  · The plaster cast or splint gets wet or soft  · The fiberglass cast or splint stays wet for more than 24 hours  · The cast has a bad smell  · The plaster cast or splint becomes loose  · There is increased knee pain or tightness under the brace, splint, or cast  · Your toes become swollen, cold, blue, numb, or tingly  Date Last Reviewed: 12/3/2015  © 2704-1736 [employer]. [address] [place] [address], [place] [address]. All rights reserved. This information is not intended as a substitute for professional medical care. Always follow your healthcare professional's instructions.

## 2018-10-04 NOTE — TELEPHONE ENCOUNTER
----- Message from Suzette Damon sent at 10/4/2018 12:15 PM CDT -----  Contact: Self  Pt is calling because she was seen in the ER for a fractured patella and advised to be seen by an Orthopedic Surgeon within the next few days.  She is requesting Staff contact her for scheduling because the  was unsuccessful in finding availability.  There is a referral in Epic.    She can be reached at [phone].    Thank you.

## 2018-10-04 NOTE — PATIENT INSTRUCTIONS
Keep immobilizer on at all times and no weight bearing until seeing ortho    Call [phone] to set up apt with ortho      Knee Pain  Knee pain is very common. Its especially common in active people who put a lot of pressure on their knees, like runners. It affects women more often than men.  Your kneecap (patella) is a thick, round bone. It covers and protects the front portion of your knee joint. It moves along a groove in your thighbone (femur) as part of the patellofemoral joint. A layer of cartilage surrounds the underside of your kneecap. This layer protects it from grinding against your femur.  When this cartilage softens and breaks down, it can cause knee pain. This is partly because of repetitive stress. The stress irritates the lining of the joint. This causes pain in the underlying bone.  What causes knee pain?  Many things can cause knee pain. You may have more than one cause. Some of these include:  · Overuse of the knee joint  · The kneecap doesnt line up with the tissue around it  · Damage to small nerves in the area  · Damage to the ligament-like structure that holds the kneecap in place (retinaculum)  · Breakdown of the bone under the cartilage  · Swelling in the soft tissues around the kneecap  · Injury  You might be more likely to have knee pain if you:  · Exercise a lot  · Recently increased the intensity of your workouts  · Have a body mass index (BMI) greater than 25  · Have poor alignment of your kneecap  · Walk with your feet turned overly outward or inward  · Have weakness in surrounding muscle groups (inner quad or hip adductor muscles)  · Have too much tightness in surrounding muscle groups (hamstrings or iliotibial band)  · Have a recent history of injury to the area  · Are female  Symptoms of knee pain  This type of knee pain is a dull, aching pain in the front of the knee in the area under and around the kneecap. This pain may start quickly or slowly. Your pain might be worse when  you squat, run, or sit for a long time. You might also sometimes feel like your knee is giving out. You may have symptoms in one or both of your knees.  Diagnosing knee pain  Your healthcare provider will ask about your medical history and your symptoms. Be sure to describe any activities that make your knee pain worse. He or she will look at your knee. This will include tests of your range of motion, strength, and areas of pain of your knee. Your knee alignment will be checked.  Your healthcare provider will need to rule out other causes of your knee pain, such as arthritis. You may need an imaging test, such as an X-ray or MRI.  Treatment for knee pain  Treatments that can help ease your symptoms may include:  · Avoiding activities for a while that make your pain worse, returning to activity over time  · Icing the outside of your knee when it causes you pain  · Taking over-the-counter pain medicine  · Wearing a knee brace or taping your knee to support it  · Wearing special shoe inserts to help keep your feet in the proper alignment  · Doing special exercises to stretch and strengthen the muscles around your hip and your knee  These steps help most people manage knee pain. But some cases of knee pain need to be treated with surgery. You may need surgery right away. Or you may need it later if other treatments dont work. Your healthcare provider may refer you to an orthopedic surgeon. He or she will talk with you about your choices.  Preventing knee pain  Losing weight and correcting excess muscle tightness or muscle weakness may help lower your risk.  In some cases, you can prevent knee pain. To help prevent a flare-up of knee pain, you do these things:  · Regularly do all the exercises your doctor or physical therapist advises  · Support your knee as advised by your doctor or physical therapist  · Increase training gradually, and ease up on training when needed  · Have an expert check your gait for running or  other sporting activities  · Stretch properly before and after exercise  · Replace your running shoes regularly  · Lose excess weight     When to call your healthcare provider  Call your healthcare provider right away if:  · Your symptoms dont get better after a few weeks of treatment  · You have any new symptoms   Date Last Reviewed: 4/1/2017  © 8981-9012 [employer]. [address] [place] [address], [place] [address]. All rights reserved. This information is not intended as a substitute for professional medical care. Always follow your healthcare professional's instructions.        Knee Fracture    You have a break, or fracture, of the knee joint. This causes pain, swelling, and sometimes bruising.  This type of fracture is treated with a splint, cast, or knee brace, also called an immobilizer. It will take about 4 to 6 weeks for the fracture to heal. But it may take much longer for you to fully recover and go back to all your activities. Surgery may be needed to fix severe injuries.     Home care  · You will be given a splint, cast, or knee brace to prevent your knee joint from moving. Use crutches or a walker, unless you were told otherwise. Dont bear weight on your injured leg until your provider says its OK to do so. Crutches and walkers can be rented at many pharmacies and surgical or orthopedic supply stores.  · Keep your leg raised, or elevated, to reduce pain and swelling. When sleeping, place a pillow under your injured leg. When sitting, support your injured leg so it is level with your waist. This is very important during the first 48 hours.  · Apply an ice pack over the injured area for no more than 15 to 20 minutes. Do this every 1 to 2 hours for the first 24 to 48 hours. Keep using ice packs as needed to ease pain and swelling.  · To make an ice pack, put ice cubes in a sealed zip-lock plastic bag wrapped in a clean, thin towel or cloth. Never put ice or an ice pack directly on your skin. The  ice pack can be put right on the cast, splint, or brace. As the ice melts, be careful that the cast, splint, or brace doesnt get wet.  · If you have a hook-and-loop closure knee brace, and your healthcare provider approves, open the brace to put the ice pack directly on your knee. Wrap the ice pack in a clean, thin towel or cloth. Be careful not to move your knee.   · Keep the cast, splint, or brace dry at all times. Bathe with your cast, splint, or brace out of the water. Protect it with 2 large plastic bags. Place 1 bag around the other. Tape each bag with duct tape at the top end. Water can still leak in. So it's best to keep the cast, splint, or brace away from water. If a fiberglass splint or cast gets wet, dry it with a hair dryer on a cool setting.  · You may use over-the-counter pain medicine to ease pain, unless another pain medicine was prescribed. Always talk with your provider before using these medicines if you have chronic liver or kidney disease, or ever had a stomach ulcer or GI (gastrointestinal) bleeding.      Follow-up care  Follow up with your healthcare provider within 1 week, or as advised. This is to be sure the bone is healing properly.  If any X-rays were taken, you will be told of any new findings that may affect your care.     When to seek medical advice  Call your healthcare provider right away if any of the following occur:  · The plaster cast or splint gets wet or soft  · The fiberglass cast or splint stays wet for more than 24 hours  · The cast has a bad smell  · The plaster cast or splint becomes loose  · There is increased knee pain or tightness under the brace, splint, or cast  · Your toes become swollen, cold, blue, numb, or tingly  Date Last Reviewed: 12/3/2015  © 0001-6416 [employer]. [address] [place] [address], [place] [address]. All rights reserved. This information is not intended as a substitute for professional medical care. Always follow your healthcare  professional's instructions.

## 2018-10-05 ENCOUNTER — HOSPITAL ENCOUNTER (OUTPATIENT)
Dept: RADIOLOGY | Facility: HOSPITAL | Age: 53
Discharge: HOME | End: 2018-10-05
Attending: PHYSICIAN ASSISTANT
Payer: COMMERCIAL

## 2018-10-05 ENCOUNTER — OFFICE VISIT (OUTPATIENT)
Dept: SPORTS MEDICINE | Facility: CLINIC | Age: 53
End: 2018-10-05
Payer: COMMERCIAL

## 2018-10-05 VITALS
SYSTOLIC BLOOD PRESSURE: 118 MMHG | DIASTOLIC BLOOD PRESSURE: 81 MMHG | BODY MASS INDEX: 23.9 KG/M2 | HEART RATE: 73 BPM | WEIGHT: 140 LBS | HEIGHT: 64 IN

## 2018-10-05 DIAGNOSIS — S82.034A NONDISPLACED TRANSVERSE FRACTURE OF RIGHT PATELLA, INITIAL ENCOUNTER FOR CLOSED FRACTURE: ICD-10-CM

## 2018-10-05 DIAGNOSIS — S82.034A NONDISPLACED TRANSVERSE FRACTURE OF RIGHT PATELLA, INITIAL ENCOUNTER FOR CLOSED FRACTURE: Primary | ICD-10-CM

## 2018-10-05 PROCEDURE — 99999 PR PBB SHADOW E&M-EST. PATIENT-LVL III: CPT | Mod: PBBFAC,,, | Performed by: PHYSICIAN ASSISTANT

## 2018-10-05 PROCEDURE — 73721 MRI JNT OF LWR EXTRE W/O DYE: CPT | Mod: TC,RT

## 2018-10-05 PROCEDURE — 73721 MRI JNT OF LWR EXTRE W/O DYE: CPT | Mod: 26,RT,, | Performed by: RADIOLOGY

## 2018-10-05 PROCEDURE — 3008F BODY MASS INDEX DOCD: CPT | Mod: CPTII,S$GLB,, | Performed by: PHYSICIAN ASSISTANT

## 2018-10-05 PROCEDURE — 99204 OFFICE O/P NEW MOD 45 MIN: CPT | Mod: S$GLB,,, | Performed by: PHYSICIAN ASSISTANT

## 2018-10-05 NOTE — LETTER
October 5, 2018      Camilo Rios, NP  [address] [facility]  [place] [address]           [address] [address]  Phone: [phone]          Patient: Grace Beltran   MR Number: [id number]   YOB: 1965   Date of Visit: 10/5/2018       Dear Camilo Rios:    Thank you for referring Grace Beltran to me for evaluation. Attached you will find relevant portions of my assessment and plan of care.    If you have questions, please do not hesitate to call me. I look forward to following Grace Beltran along with you.    Sincerely,    Shital Hutchins PA-C    Enclosure  CC:  No Recipients    If you would like to receive this communication electronically, please contact [email or web address] or [phone] to request more information on [employer] Link access.    For providers and/or their staff who would like to refer a patient to [facility], please contact us through our one-stop-shop provider referral line, [facility], at [phone].    If you feel you have received this communication in error or would no longer like to receive these types of communications, please e-mail [email or web address]

## 2018-10-05 NOTE — PROGRESS NOTES
CC: Right knee pain    53 y.o. Female with a history of right knee pain x 10/4/18. She slipped on dog pee in her home and landed directly on her right kneecap. She later was crossing the street on crutches and slipped on rodriguez, falling again. She then went to urgent care and was diagnosed with a transverse patella fracture. She has been ambulating with crutches. No prior knee injury or surgery. She states that the pain is severe and not responding to any conservative care.      She reports that the pain and weakness. It also bothers her at night.    Is affecting ADLs.  Pain is 7/10 at it's worst.    REVIEW OF SYSTEMS:   Constitution: Negative. Negative for chills, fever and night sweats.   HENT: Negative for congestion and headaches.    Eyes: Negative for blurred vision, left vision loss and right vision loss.   Cardiovascular: Negative for chest pain and syncope.   Respiratory: Negative for cough and shortness of breath.    Endocrine: Negative for polydipsia, polyphagia and polyuria.   Hematologic/Lymphatic: Negative for bleeding problem. Does not bruise/bleed easily.   Skin: Negative for dry skin, itching and rash.   Musculoskeletal: Negative for falls. Positive for right knee pain and  muscle weakness.   Gastrointestinal: Negative for abdominal pain and bowel incontinence.   Genitourinary: Negative for bladder incontinence and nocturia.   Neurological: Negative for disturbances in coordination, loss of balance and seizures.   Psychiatric/Behavioral: Negative for depression. The patient does not have insomnia.    Allergic/Immunologic: Negative for hives and persistent infections.     PAST MEDICAL HISTORY:   Past Medical History:   Diagnosis Date    Acne     Dermatologist prescribes spironolactone     Anxiety     celexa 40    Chronic constipation 5/24/2017    Hot flashes due to menopause 5/24/2017    Osteopenia of spine 5/24/2017    Primary osteoarthritis of left hip 05/24/2017    [facility] needling at [facility]  helping, MRI 2016, Injection didn't help much    Right elbow pain 07/20/2017    [facility]       PAST SURGICAL HISTORY:   Past Surgical History:   Procedure Laterality Date    INJECTION-JOINT Left 2/2/2015    Performed by Abby Fitzpatrick [facility] at [facility]    right hip surgery      April 24, 2012       FAMILY HISTORY:   Family History   Problem Relation Age of Onset    Hypertension Mother     Hyperlipidemia Mother     Osteoporosis Mother     Breast cancer Maternal Aunt 65       SOCIAL HISTORY:   Social History     Socioeconomic History    Marital status: Single     Spouse name: Not on file    Number of children: Not on file    Years of education: Not on file    Highest education level: Not on file   Social Needs    Financial resource strain: Not on file    Food insecurity - worry: Not on file    Food insecurity - inability: Not on file    Transportation needs - medical: Not on file    Transportation needs - non-medical: Not on file   Occupational History    Not on file   Tobacco Use    Smoking status: Never Smoker    Smokeless tobacco: Never Used   Substance and Sexual Activity    Alcohol use: Yes     Alcohol/week: 2.4 oz     Types: 4 Standard drinks or equivalent per week     Comment: 4 beers a week    Drug use: No    Sexual activity: Yes     Partners: Female   Other Topics Concern    Not on file   Social History Narrative     with LAIRIS (works with HIV, Hepatitis C), female partner    2 daughters  - one is a competitive cheerleader with [employer] ([place]), 1 son (Yves)    Nonsmoker, ETOH 4 x / week    GYN [facility], never had colonoscopy       MEDICATIONS:     Current Outpatient Medications:     citalopram (CELEXA) 40 MG tablet, take 1 tablet by mouth once daily, Disp: 90 tablet, Rfl: 3  No current facility-administered medications for this visit.     ALLERGIES:   Review of patient's allergies indicates:   Allergen Reactions    Codeine      Other  "reaction(s): Nausea    Hydrocodone      Other reaction(s): Vomiting  Other reaction(s): Nausea    Promethazine      Other reaction(s): Vomiting  Other reaction(s): Nausea       VITAL SIGNS:   /81   Pulse 73   Ht 5' 4" (1.626 m)   Wt 63.5 kg (140 lb)   LMP 11/29/2016 (Approximate)   BMI 24.03 kg/m²      PHYSICAL EXAMINATION:  General:  The patient is alert and oriented x 3.  Mood is pleasant.  Observation of ears, eyes and nose reveal no gross abnormalities.  No labored breathing observed.    RIGHT KNEE EXAMINATION     OBSERVATION / INSPECTION   Gait:   antalgic   Alignment:  Neutral   Scars:   None   Muscle atrophy: Mild  Effusion:  moderate   Warmth:  None   Discoloration:   none     TENDERNESS / CREPITUS (T / C):          T / C      T / C   Patella   + / -   Lateral joint line   - / -   Peripatellar medial  -  Medial joint line    - / -   Peripatellar lateral -  Medial plica   - / -   Patellar tendon +   Popliteal fossa   - / -   Quad tendon   -   Gastrocnemius   -   Prepatellar Bursa - / -   Quadricep   -   Tibial tubercle  -  Thigh/hamstring  -   Pes anserine/HS -  Fibula    -   ITB   - / -  Tibia     -   Tib/fib joint  - / -  LCL    -     MFC   - / -   MCL: Proximal  -    LFC   - / -    Distal   -          ROM: (* = pain)  PASSIVE   ACTIVE    Left :   0 / 0 / 145   0 / 0 / 140     Right :    0 / 0 / NT   0 / 0 / NT    Patellofemoral examination:  See above noted areas of tenderness.   Patella position    Subluxation / dislocation: Centered           Sup. / Inf;   Normal   Crepitus (PF):    NT   Patellar Mobility:       Medial-lateral:   Normal    Superior-inferior:  Normal    Inferior tilt   Normal    Patellar tendon:  Normal   Lateral tilt:    Normal   J-sign:     NT   Patellofemoral grind:   NT      MENISCAL SIGNS:     Pain on terminal extension:  -  Pain on terminal flexion:  NT  Kirills maneuver:  NT  Squat     NT    LIGAMENT EXAMINATION:  ACL / Lachman:  normal (-1 to 2mm)    PCL-Post.  " drawer: normal 0 to 2mm  MCL- Valgus:  normal 0 to 2mm  LCL- Varus:  normal 0 to 2mm  Pivot shift: normal (Equal)   Dial Test: difference c/w other side   At 30° flexion: normal (< 5°)    At 90° flexion: normal (< 5°)   Reverse Pivot Shift:   normal (Equal)     STRENGTH: (* = with pain) PAINFUL SIDE   Quadricep   NT   Hamstring:   NT    EXTREMITY NEURO-VASCULAR EXAMINATION:   Sensation:  Grossly intact to light touch all dermatomal regions.   Motor Function:  Fully intact motor function at hip, knee, foot and ankle    DTRs;  quadriceps and  achilles 2+.  No clonus and downgoing Babinski.    Vascular status:  DP and PT pulses 2+, brisk capillary refill, symmetric.     OTHER FINDINGS:  Inability to perform straight leg raise    IMAGING:    X-rays 10/4/18 including AP, oblique and lateral views ordered and images reviewed by me show:   FINDINGS:  Bones are fairly well mineralized.  There is a transverse fracture of the patella with associated hemarthrosis.  Remainder of the bones are intact.     ASSESSMENT:    Right Nondisplaced Patella Tendon Fracture    PLAN:   1. MRI Right knee to evaluate extensor mechanism  2. T-scope applied by Tony, locked in full extension with ambulation and crutches  3. RTC on Tuesday for follow up in Dr. Quintana's clinic to review MRI and discuss further management options for patella fx    All questions were answered, pt will contact us for questions or concerns in the interim.    The total face-to-face encounter time with this patient was 45minutes and greater than 50% of of the encounter time was spent counseling the patient and coordinating care. Significant time was also spent educating the patient, giving detail post-visit instructions, and discussing risks and benefits of treatment. Patient also had several specific questions that were answered during the visit today.

## 2018-10-09 ENCOUNTER — OFFICE VISIT (OUTPATIENT)
Dept: SPORTS MEDICINE | Facility: CLINIC | Age: 53
End: 2018-10-09
Payer: COMMERCIAL

## 2018-10-09 VITALS
DIASTOLIC BLOOD PRESSURE: 79 MMHG | WEIGHT: 140 LBS | SYSTOLIC BLOOD PRESSURE: 113 MMHG | HEART RATE: 72 BPM | HEIGHT: 64 IN | BODY MASS INDEX: 23.9 KG/M2

## 2018-10-09 DIAGNOSIS — M25.561 ACUTE PAIN OF RIGHT KNEE: ICD-10-CM

## 2018-10-09 DIAGNOSIS — M25.40 JOINT EFFUSION: ICD-10-CM

## 2018-10-09 DIAGNOSIS — S82.034D CLOSED NONDISPLACED TRANSVERSE FRACTURE OF RIGHT PATELLA WITH ROUTINE HEALING, SUBSEQUENT ENCOUNTER: Primary | ICD-10-CM

## 2018-10-09 PROCEDURE — 99999 PR PBB SHADOW E&M-EST. PATIENT-LVL III: CPT | Mod: PBBFAC,,, | Performed by: ORTHOPAEDIC SURGERY

## 2018-10-09 PROCEDURE — 20610 DRAIN/INJ JOINT/BURSA W/O US: CPT | Mod: RT,S$GLB,, | Performed by: ORTHOPAEDIC SURGERY

## 2018-10-09 PROCEDURE — 3008F BODY MASS INDEX DOCD: CPT | Mod: CPTII,S$GLB,, | Performed by: ORTHOPAEDIC SURGERY

## 2018-10-09 PROCEDURE — 99214 OFFICE O/P EST MOD 30 MIN: CPT | Mod: 25,S$GLB,, | Performed by: ORTHOPAEDIC SURGERY

## 2018-10-09 NOTE — PROCEDURES
Large Joint Aspiration/Injection: R knee  Date/Time: 10/9/2018 3:51 PM  Performed by: Jay Quintana MD  Authorized by: Jay Quintana MD     Consent Done?:  Yes (Verbal)  Indications:  Pain and joint swelling  Procedure site marked: Yes    Timeout: Prior to procedure the correct patient, procedure, and site was verified      Location:  Knee  Site:  R knee  Prep: Patient was prepped and draped in usual sterile fashion    Ultrasonic Guidance for needle placement: No  Needle size:  22 G  Approach:  Superior  Aspirate amount (ml):  40  Patient tolerance:  Patient tolerated the procedure well with no immediate complications

## 2018-10-09 NOTE — PROGRESS NOTES
CC: Right knee pain    53 y.o. Female with a history of right knee pain since 10/4/18. She slipped on dog pee in her home and landed directly on her right kneecap. She later was crossing the street on crutches and slipped on rodriguez, falling again. She then went to urgent care and was diagnosed with a transverse patella fracture. She has been ambulating with crutches. No prior knee injury or surgery. She states that the pain is severe and not responding to any conservative care.      Given the appearance on XR (nondisplaced) but her inability to perform a SLR she was sent for an MRI to eval the quad and patellar tendons. Today she states that the effusion and pain are improved. She has been in a hinged knee brace. She is able to do a SLR today.    REVIEW OF SYSTEMS:   Constitution: Negative. Negative for chills, fever and night sweats.   HENT: Negative for congestion and headaches.    Eyes: Negative for blurred vision, left vision loss and right vision loss.   Cardiovascular: Negative for chest pain and syncope.   Respiratory: Negative for cough and shortness of breath.    Endocrine: Negative for polydipsia, polyphagia and polyuria.   Hematologic/Lymphatic: Negative for bleeding problem. Does not bruise/bleed easily.   Skin: Negative for dry skin, itching and rash.   Musculoskeletal: Negative for falls. Positive for right knee pain and  muscle weakness.   Gastrointestinal: Negative for abdominal pain and bowel incontinence.   Genitourinary: Negative for bladder incontinence and nocturia.   Neurological: Negative for disturbances in coordination, loss of balance and seizures.   Psychiatric/Behavioral: Negative for depression. The patient does not have insomnia.    Allergic/Immunologic: Negative for hives and persistent infections.     PAST MEDICAL HISTORY:   Past Medical History:   Diagnosis Date    Acne     Dermatologist prescribes spironolactone     Anxiety     celexa 40    Chronic constipation 5/24/2017    Hot  flashes due to menopause 5/24/2017    Osteopenia of spine 5/24/2017    Primary osteoarthritis of left hip 05/24/2017    [facility], MRI 2016, Injection didn't help much    Right elbow pain 07/20/2017    [facility] needling at [facility]       PAST SURGICAL HISTORY:   Past Surgical History:   Procedure Laterality Date    INJECTION-JOINT Left 2/2/2015    [facility]    right hip surgery      April 24, 2012       FAMILY HISTORY:   Family History   Problem Relation Age of Onset    Hypertension Mother     Hyperlipidemia Mother     Osteoporosis Mother     Breast cancer Maternal Aunt 65       SOCIAL HISTORY:   Social History     Socioeconomic History    Marital status: Single     Spouse name: Not on file    Number of children: Not on file    Years of education: Not on file    Highest education level: Not on file   Social Needs    Financial resource strain: Not on file    Food insecurity - worry: Not on file    Food insecurity - inability: Not on file    Transportation needs - medical: Not on file    Transportation needs - non-medical: Not on file   Occupational History    Not on file   Tobacco Use    Smoking status: Never Smoker    Smokeless tobacco: Never Used   Substance and Sexual Activity    Alcohol use: Yes     Alcohol/week: 2.4 oz     Types: 4 Standard drinks or equivalent per week     Comment: 4 beers a week    Drug use: No    Sexual activity: Yes     Partners: Female   Other Topics Concern    Not on file   Social History Narrative     with LAIRIS (works with HIV, Hepatitis C), female partner    2 daughters  - one is a competitive cheerleader with [employer] ([place]), 1 son (Yves)    Nonsmoker, ETOH 4 x / week    GYN [facility], never had colonoscopy       MEDICATIONS:     Current Outpatient Medications:     citalopram (CELEXA) 40 MG tablet, take 1 tablet by mouth once daily, Disp: 90 tablet, Rfl: 3    ALLERGIES:   Review of  "patient's allergies indicates:   Allergen Reactions    Codeine      Other reaction(s): Nausea    Hydrocodone      Other reaction(s): Vomiting  Other reaction(s): Nausea    Promethazine      Other reaction(s): Vomiting  Other reaction(s): Nausea       VITAL SIGNS:   /79   Pulse 72   Ht 5' 4" (1.626 m)   Wt 63.5 kg (140 lb)   LMP 11/29/2016 (Approximate)   BMI 24.03 kg/m²      PHYSICAL EXAMINATION:  General:  The patient is alert and oriented x 3.  Mood is pleasant.  Observation of ears, eyes and nose reveal no gross abnormalities.  No labored breathing observed.    RIGHT KNEE EXAMINATION     OBSERVATION / INSPECTION   Gait:   antalgic   Alignment:  Neutral   Scars:   None   Muscle atrophy: Mild  Effusion:  moderate   Warmth:  None   Discoloration:   none     TENDERNESS / CREPITUS (T / C):          T / C      T / C   Patella   + / -   Lateral joint line   - / -   Peripatellar medial  -  Medial joint line    - / -   Peripatellar lateral -  Medial plica   - / -   Patellar tendon +   Popliteal fossa   - / -   Quad tendon   -   Gastrocnemius   -   Prepatellar Bursa - / -   Quadricep   -   Tibial tubercle  -  Thigh/hamstring  -   Pes anserine/HS -  Fibula    -   ITB   - / -  Tibia     -   Tib/fib joint  - / -  LCL    -     MFC   - / -   MCL: Proximal  -    LFC   - / -    Distal   -          ROM: (* = pain)  PASSIVE   ACTIVE    Left :   0 / 0 / 145   0 / 0 / 140     Right :    0 / 0 / NT   0 / 0 / NT    Patellofemoral examination:  See above noted areas of tenderness.   Patella position    Subluxation / dislocation: Centered           Sup. / Inf;   Normal   Crepitus (PF):    NT   Patellar Mobility:       Medial-lateral:   Normal    Superior-inferior:  Normal    Inferior tilt   Normal    Patellar tendon:  Normal   Lateral tilt:    Normal   J-sign:     NT   Patellofemoral grind:   NT      MENISCAL SIGNS:     Pain on terminal extension:  -  Pain on terminal flexion:  NT  Kirills " maneuver:  NT  Squat     NT    LIGAMENT EXAMINATION:  ACL / Lachman:  normal (-1 to 2mm)    PCL-Post.  drawer: normal 0 to 2mm  MCL- Valgus:  normal 0 to 2mm  LCL- Varus:  normal 0 to 2mm  Pivot shift: normal (Equal)   Dial Test: difference c/w other side   At 30° flexion: normal (< 5°)    At 90° flexion: normal (< 5°)   Reverse Pivot Shift:   normal (Equal)     STRENGTH: (* = with pain) PAINFUL SIDE   Quadricep   NT   Hamstring:   NT    EXTREMITY NEURO-VASCULAR EXAMINATION:   Sensation:  Grossly intact to light touch all dermatomal regions.   Motor Function:  Fully intact motor function at hip, knee, foot and ankle    DTRs;  quadriceps and  achilles 2+.  No clonus and downgoing Babinski.    Vascular status:  DP and PT pulses 2+, brisk capillary refill, symmetric.     OTHER FINDINGS:  + able to perform a straight leg raise against gravity    IMAGING:    X-rays 10/4/18 including AP, oblique and lateral views ordered and images reviewed by me show:   FINDINGS:  Bones are fairly well mineralized.  There is a transverse fracture of the patella with associated hemarthrosis.  Remainder of the bones are intact.      MRI R knee (10/5/18):  Impression       Nondisplaced horizontal patellar fracture with associated edema of the inferior patella.    Patellar tendinitis.    Large joint effusion.    Soft tissue edema and fluid prepatellar soft tissue region.    Small area of superficial chondromalacia involving the lateral posterior aspect of the medial femoral condyle cartilage.            ASSESSMENT:    Right Nondisplaced Patellar Fracture    PLAN:   1. Plan to manage nonoperatively given nondisplaced nature of fx and intact extensor mechanism  2. R knee aspiration performed today  3. WBAT in T-scope knee brace locked in extension   4. RTC in 2 weeks - will get repeat XR at that time to verify alignment of fx; if XR look good at that time will get her into PT to start some gentle knee ROM. She will continue to be WBAT in  T-scope locked in extension for 6 weeks, however she can start gentle flexion w/ PT from 2-6 weeks w/ goal of increasing 15deg each week and obtaining 90deg flexion at 6 weeks

## 2018-10-25 ENCOUNTER — HOSPITAL ENCOUNTER (OUTPATIENT)
Dept: RADIOLOGY | Facility: HOSPITAL | Age: 53
Discharge: HOME | End: 2018-10-25
Attending: ORTHOPAEDIC SURGERY
Payer: COMMERCIAL

## 2018-10-25 ENCOUNTER — OFFICE VISIT (OUTPATIENT)
Dept: SPORTS MEDICINE | Facility: CLINIC | Age: 53
End: 2018-10-25
Payer: COMMERCIAL

## 2018-10-25 VITALS
SYSTOLIC BLOOD PRESSURE: 122 MMHG | HEIGHT: 64 IN | DIASTOLIC BLOOD PRESSURE: 75 MMHG | HEART RATE: 80 BPM | BODY MASS INDEX: 23.9 KG/M2 | WEIGHT: 140 LBS

## 2018-10-25 DIAGNOSIS — M25.561 RIGHT KNEE PAIN, UNSPECIFIED CHRONICITY: Primary | ICD-10-CM

## 2018-10-25 DIAGNOSIS — S82.009A PATELLA FRACTURE: ICD-10-CM

## 2018-10-25 DIAGNOSIS — M25.561 RIGHT KNEE PAIN, UNSPECIFIED CHRONICITY: ICD-10-CM

## 2018-10-25 PROCEDURE — 73560 X-RAY EXAM OF KNEE 1 OR 2: CPT | Mod: TC,FY,PO,RT

## 2018-10-25 PROCEDURE — 3008F BODY MASS INDEX DOCD: CPT | Mod: CPTII,S$GLB,, | Performed by: ORTHOPAEDIC SURGERY

## 2018-10-25 PROCEDURE — 73560 X-RAY EXAM OF KNEE 1 OR 2: CPT | Mod: 26,RT,, | Performed by: RADIOLOGY

## 2018-10-25 PROCEDURE — 99214 OFFICE O/P EST MOD 30 MIN: CPT | Mod: S$GLB,,, | Performed by: ORTHOPAEDIC SURGERY

## 2018-10-25 PROCEDURE — 99999 PR PBB SHADOW E&M-EST. PATIENT-LVL III: CPT | Mod: PBBFAC,,, | Performed by: ORTHOPAEDIC SURGERY

## 2018-10-25 NOTE — PROGRESS NOTES
CC: Right knee pain    53 y.o. Female returns to clinic for repeat xrays to evaluate patella fracture healing.    History of right knee pain since 10/4/18. She slipped on dog pee in her home and landed directly on her right kneecap. She later was crossing the street on crutches and slipped on rodriguez, falling again. She then went to urgent care and was diagnosed with a transverse patella fracture. She has been ambulating with crutches. No prior knee injury or surgery. She states that the pain is severe and not responding to any conservative care.      Given the appearance on XR (nondisplaced) but her inability to perform a SLR she was sent for an MRI to eval the quad and patellar tendons. Today she states that the effusion and pain are improved. She has been in a hinged knee brace. She is able to do a SLR today.    REVIEW OF SYSTEMS:   Constitution: Negative. Negative for chills, fever and night sweats.   HENT: Negative for congestion and headaches.    Eyes: Negative for blurred vision, left vision loss and right vision loss.   Cardiovascular: Negative for chest pain and syncope.   Respiratory: Negative for cough and shortness of breath.    Endocrine: Negative for polydipsia, polyphagia and polyuria.   Hematologic/Lymphatic: Negative for bleeding problem. Does not bruise/bleed easily.   Skin: Negative for dry skin, itching and rash.   Musculoskeletal: Negative for falls. Positive for right knee pain and  muscle weakness.   Gastrointestinal: Negative for abdominal pain and bowel incontinence.   Genitourinary: Negative for bladder incontinence and nocturia.   Neurological: Negative for disturbances in coordination, loss of balance and seizures.   Psychiatric/Behavioral: Negative for depression. The patient does not have insomnia.    Allergic/Immunologic: Negative for hives and persistent infections.     PAST MEDICAL HISTORY:   Past Medical History:   Diagnosis Date    Acne     Dermatologist prescribes spironolactone      Anxiety     celexa 40    Chronic constipation 5/24/2017    Hot flashes due to menopause 5/24/2017    Osteopenia of spine 5/24/2017    Primary osteoarthritis of left hip 05/24/2017    [facility] needling [facility], MRI 2016, Injection didn't help much    Right elbow pain 07/20/2017    dry needling at [facility]       PAST SURGICAL HISTORY:   Past Surgical History:   Procedure Laterality Date    INJECTION-JOINT Left 2/2/2015    [facility]patrick [facility]    right hip surgery      April 24, 2012       FAMILY HISTORY:   Family History   Problem Relation Age of Onset    Hypertension Mother     Hyperlipidemia Mother     Osteoporosis Mother     Breast cancer Maternal Aunt 65       SOCIAL HISTORY:   Social History     Socioeconomic History    Marital status: Single     Spouse name: Not on file    Number of children: Not on file    Years of education: Not on file    Highest education level: Not on file   Social Needs    Financial resource strain: Not on file    Food insecurity - worry: Not on file    Food insecurity - inability: Not on file    Transportation needs - medical: Not on file    Transportation needs - non-medical: Not on file   Occupational History    Not on file   Tobacco Use    Smoking status: Never Smoker    Smokeless tobacco: Never Used   Substance and Sexual Activity    Alcohol use: Yes     Alcohol/week: 2.4 oz     Types: 4 Standard drinks or equivalent per week     Comment: 4 beers a week    Drug use: No    Sexual activity: Yes     Partners: Female   Other Topics Concern    Not on file   Social History Narrative     with EMERITA (works with HIV, Hepatitis C), female partner    2 daughters  - one is a competitive cheerleader with [employer] ([place]), 1 son (Yves)    Nonsmoker, ETOH 4 x / week    GYN [facility], never had colonoscopy       MEDICATIONS:     Current Outpatient Medications:     citalopram (CELEXA) 40 MG tablet, take 1 tablet by  "mouth once daily, Disp: 90 tablet, Rfl: 3    ALLERGIES:   Review of patient's allergies indicates:   Allergen Reactions    Codeine      Other reaction(s): Nausea    Hydrocodone      Other reaction(s): Vomiting  Other reaction(s): Nausea    Promethazine      Other reaction(s): Vomiting  Other reaction(s): Nausea       VITAL SIGNS:   /75   Pulse 80   Ht 5' 4" (1.626 m)   Wt 63.5 kg (140 lb)   LMP 11/29/2016 (Approximate)   BMI 24.03 kg/m²      PHYSICAL EXAMINATION:  General:  The patient is alert and oriented x 3.  Mood is pleasant.  Observation of ears, eyes and nose reveal no gross abnormalities.  No labored breathing observed.    RIGHT KNEE EXAMINATION     OBSERVATION / INSPECTION   Gait:   antalgic   Alignment:  Neutral   Scars:   None   Muscle atrophy: Mild  Effusion:  moderate   Warmth:  None   Discoloration:   none     TENDERNESS / CREPITUS (T / C):          T / C      T / C   Patella   + / -   Lateral joint line   - / -   Peripatellar medial  -  Medial joint line    - / -   Peripatellar lateral -  Medial plica   - / -   Patellar tendon +   Popliteal fossa   - / -   Quad tendon   -   Gastrocnemius   -   Prepatellar Bursa - / -   Quadricep   -   Tibial tubercle  -  Thigh/hamstring  -   Pes anserine/HS -  Fibula    -   ITB   - / -  Tibia     -   Tib/fib joint  - / -  LCL    -     MFC   - / -   MCL: Proximal  -    LFC   - / -    Distal   -          ROM: (* = pain)  PASSIVE   ACTIVE    Left :   0 / 0 / 145   0 / 0 / 140     Right :    0 / 0 / NT   0 / 0 / NT    Patellofemoral examination:  See above noted areas of tenderness.   Patella position    Subluxation / dislocation: Centered           Sup. / Inf;   Normal   Crepitus (PF):    NT   Patellar Mobility:       Medial-lateral:   Normal    Superior-inferior:  Normal    Inferior tilt   Normal    Patellar tendon:  Normal   Lateral tilt:    Normal   J-sign:     NT   Patellofemoral grind:   NT      MENISCAL SIGNS:     Pain on terminal " extension:  -  Pain on terminal flexion:  NT  Kirills maneuver:  NT  Squat     NT    LIGAMENT EXAMINATION:  ACL / Lachman:  normal (-1 to 2mm)    PCL-Post.  drawer: normal 0 to 2mm  MCL- Valgus:  normal 0 to 2mm  LCL- Varus:  normal 0 to 2mm  Pivot shift: normal (Equal)   Dial Test: difference c/w other side   At 30° flexion: normal (< 5°)    At 90° flexion: normal (< 5°)   Reverse Pivot Shift:   normal (Equal)     STRENGTH: (* = with pain) PAINFUL SIDE   Quadricep   NT   Hamstring:   NT    EXTREMITY NEURO-VASCULAR EXAMINATION:   Sensation:  Grossly intact to light touch all dermatomal regions.   Motor Function:  Fully intact motor function at hip, knee, foot and ankle    DTRs;  quadriceps and  achilles 2+.  No clonus and downgoing Babinski.    Vascular status:  DP and PT pulses 2+, brisk capillary refill, symmetric.     OTHER FINDINGS:  + able to perform a straight leg raise against gravity    IMAGING:    X-rays 10/4/18 including AP, oblique and lateral views ordered and images reviewed by me show:   FINDINGS:  Bones are fairly well mineralized.  There is a transverse fracture of the patella with associated hemarthrosis.  Remainder of the bones are intact.      MRI R knee (10/5/18):  Impression       Nondisplaced horizontal patellar fracture with associated edema of the inferior patella.    Patellar tendinitis.    Large joint effusion.    Soft tissue edema and fluid prepatellar soft tissue region.    Small area of superficial chondromalacia involving the lateral posterior aspect of the medial femoral condyle cartilage.        ASSESSMENT:    Right Nondisplaced Patellar Fracture, healing    PLAN:   1. Plan to manage nonoperatively given nondisplaced nature of fx and intact extensor mechanism  2. T-scope 0-45 for two weeks.  Then open t-scope 0-90.    4. Follow up in 3 weeks with new xrays.  Plan to transition to short runner at next visit.

## 2018-10-30 ENCOUNTER — PATIENT MESSAGE (OUTPATIENT)
Dept: SPORTS MEDICINE | Facility: CLINIC | Age: 53
End: 2018-10-30

## 2018-10-30 ENCOUNTER — TELEPHONE (OUTPATIENT)
Dept: SPORTS MEDICINE | Facility: CLINIC | Age: 53
End: 2018-10-30

## 2018-10-30 DIAGNOSIS — G89.29 CHRONIC PAIN OF RIGHT KNEE: Primary | ICD-10-CM

## 2018-10-30 DIAGNOSIS — M25.561 CHRONIC PAIN OF RIGHT KNEE: Primary | ICD-10-CM

## 2018-10-31 ENCOUNTER — PATIENT MESSAGE (OUTPATIENT)
Dept: SPORTS MEDICINE | Facility: CLINIC | Age: 53
End: 2018-10-31

## 2018-11-01 ENCOUNTER — CLINICAL SUPPORT (OUTPATIENT)
Dept: REHABILITATION | Facility: HOSPITAL | Age: 53
End: 2018-11-01
Attending: ORTHOPAEDIC SURGERY
Payer: COMMERCIAL

## 2018-11-01 DIAGNOSIS — M25.461 EFFUSION OF RIGHT KNEE: ICD-10-CM

## 2018-11-01 DIAGNOSIS — S82.001D: Primary | ICD-10-CM

## 2018-11-01 DIAGNOSIS — M62.81 MUSCLE WEAKNESS: ICD-10-CM

## 2018-11-01 PROCEDURE — 97110 THERAPEUTIC EXERCISES: CPT

## 2018-11-01 PROCEDURE — 97161 PT EVAL LOW COMPLEX 20 MIN: CPT

## 2018-11-01 NOTE — PLAN OF CARE
[facility]  Physical Therapy Initial Evaluation    Name: Grace Beltran  Clinic Number: [id number]    Therapy Diagnosis:   Encounter Diagnoses   Name Primary?    Fracture of right patella with routine healing Yes    Muscle weakness     Effusion of right knee      Physician: Jay Quintana MD    Physician Orders: PT Eval and Treat Right Knee  Medical Diagnosis from Referral: M25.561,G89.29 (ICD-10-CM) - Chronic pain of right knee  Evaluation Date: 11/1/2018  Authorization Period Expiration: 12-  Plan of Care Expiration: 12-  Visit # / Visits authorized: 1/25    Time In: 2:06 PM  Time Out: 3:02 PM  Total Billable Time: 56 minutes    Precautions: Standard and 0 - 45 AROM/PROM in Brace, WBAT in Brace for 2 weeks then 0 - 90 AROM/PROM in Brace, WBAT in Brace for following 2 weeks    Subjective   Date of onset: October 4th   History of current condition - Grace reports: that she has a broken patella that occurred on October 4th. She slipped and fell in a puddle of dog pee and she fell and landed on her R knee. She used her crutches that she had at her home immediately after the first fall. Unfortunately, because the crutches weren't fitted correctly she tripped and fell again about an hour and a half and fell on the R knee again. The swelling was so bad she had difficulty moving the leg. She went to emergency care that day and it was determined that she had a fractured patella. She went and saw Dr. Quintana the Tuesday after the first injury. They were considering surgery but due to it being a clean break they decided against surgery.    Past Medical History:   Diagnosis Date    Acne     Dermatologist prescribes spironolactone     Anxiety     celexa 40    Chronic constipation 5/24/2017    Hot flashes due to menopause 5/24/2017    Osteopenia of spine 5/24/2017    Primary osteoarthritis of left hip 05/24/2017    [facility] needling [facility], MRI 2016,  Injection didn't help much    Right elbow pain 07/20/2017    [facility] needling at [facility]     Grace Beltran  has a past surgical history that includes right hip surgery and INJECTION-JOINT (Left, 2/2/2015).    Grace has a current medication list which includes the following prescription(s): citalopram.    Review of patient's allergies indicates:   Allergen Reactions    Codeine      Other reaction(s): Nausea    Hydrocodone      Other reaction(s): Vomiting  Other reaction(s): Nausea    Promethazine      Other reaction(s): Vomiting  Other reaction(s): Nausea        Imaging, MRI studies, X-Ray:   X-Ray Knee 1 or 2 View Right   Order: [id number]   Status:  Final result   Visible to patient:  Yes (Patient Portal)   Next appt:  Today at 02:00 PM in Outpatient Rehab (Noel Irwin, PT)   Dx:  Right knee pain, unspecified chronicity   Details     Reading Physician Reading Date Result Priority   Dell Valencia MD 10/25/2018       Narrative     EXAMINATION:  XR KNEE 1 OR 2 VIEW RIGHT    CLINICAL HISTORY:  Pain in right knee    TECHNIQUE:  AP and lateral views of the right knee were performed.    COMPARISON:  10/04/2018    FINDINGS:  fracture through the patella is seen in good position and alignment no joint effusion or other abnormality.  Bones are mildly demineralized.      Impression       Satisfactory position and alignment of patellar fracture.      Electronically signed by: Dell Valencia MD  Date: 10/25/2018  Time: 14:06           MRI Knee Without Contrast Right   Order: [id number]   Status:  Final result   Visible to patient:  Yes (Patient Portal)   Next appt:  Today at 02:00 PM in Outpatient Rehab (Noel Irwin, PT)   Dx:  Nondisplaced transverse fracture of r...   Details     Reading Physician Reading Date Result Priority   Ana Catalan MD 10/5/2018       Narrative     EXAMINATION:  MRI KNEE WITHOUT CONTRAST RIGHT    CLINICAL HISTORY:  patella fx with inability to perform  straight leg raise please evaluate extensor mechanism;Nondisplaced transverse fracture of right patella, initial encounter for closed fracture    TECHNIQUE:  Multiplanar, multisequence images were performed about the knee.    COMPARISON:  None    FINDINGS:  The ACL, PCL, mcl and LCL complex appear intact.  The popliteus tendon is intact.  Nondisplaced horizontal patella fracture, significant edema of the inferior portion of the patella.  Patellar tendinitis.  The quadriceps tendon appears intact.  There is a large joint effusion.  There is significant edema of the prepatellar soft tissues, possibly bursitis.  There is also edema of the Hoffa's fat pad.    The lateral meniscus is intact.    The medial meniscus is intact.    High-grade fissure of the lateral patellar cartilage, likely extension of the fracture.  The trochlear cartilage is intact.    The lateral knee compartment cartilage is intact.    The medial tibial plateau cartilage is intact.  There is minimal irregularity noted of the lateral posterior aspect of the medial femoral condyle cartilage without subchondral edema.      Impression       Nondisplaced horizontal patellar fracture with associated edema of the inferior patella.    Patellar tendinitis.    Large joint effusion.    Soft tissue edema and fluid prepatellar soft tissue region.    Small area of superficial chondromalacia involving the lateral posterior aspect of the medial femoral condyle cartilage.      Electronically signed by: Ana Catalan MD  Date: 10/05/2018  Time: 14:18           Prior Therapy: No prior therapy  Social History: Lives at home lives with their partner  Occupation:   Prior Level of Function: Independent with all activities  Current Level of Function: Difficulty with walking, going up/stairs, daily activities    Pain:  Current 0/10, worst 4/10, best 0/10   Location: right knee   Description: Sharp  Aggravating Factors: Bending, Walking, Extension, Flexing  and Lifting  Easing Factors: ice and rest    Pts goals: Patient would like to return to walking and performing her daily activities without pain    Objective     Observation:   Wearing T-scope brace on R Knee.  - R Mid-Patella - 37 cm  - L Mid-Patella - 35.5 cm    Passive Range of Motion:  Knee Right   Extension 10 degrees Hyperextension   Flexion 45 degrees     Active Range of Motion:    Knee Right Left   Extension 5 degrees Hyperextension 10 degrees Hyperextension   Flexion 40 degrees 145 degrees   `  Strength:  Hip Right Left   ABDuction 4-/5 3+/5   ADDuction 3+/5 4-/5     Knee Left   Extension 5/5   Flexion 4/5     Ankle Right Left   Dorsiflexion 4/5 4+/5     Gait:  - Patient ambulates with T-Scope w/ B Axillary Crutches with appropriate safety and brace set to 0 - 45 knee flexion. She is able to bare weight through this available ROM    CMS Impairment/Limitation/Restriction for FOTO Knee Survey    Therapist reviewed FOTO scores for Grace Beltran on 11/1/2018.   FOTO documents entered into [employer] - see Media section.    Limitation Score: 72%  Category: Mobility    Current : CL = least 60% but < 80% impaired, limited or restricted  Goal: CJ = at least 20% but < 40% impaired, limited or restricted       TREATMENT   Treatment Time In: 2:45 PM  Treatment Time Out: 3:02 PM  Total Treatment time separate from Evaluation: 17 minutes    Home Exercises and Patient Education Provided    Education provided:   - Patient educated on the patellar and quadriceps tendons and their relationship to the patella and how a fracture of this structure as well as swelling can cause reduced quadriceps strength and activity. She was also educated on how knee flexion can cause compression on the patella causing increased pain.    Written Home Exercises Provided: yes.  Exercises were reviewed and Grace was able to demonstrate them prior to the end of the session.  Grace demonstrated good  understanding of the education provided.      See EMR under Media for exercises provided 11/1/2018.    Assessment   Grace is a 53 y.o. female referred to outpatient Physical Therapy with a medical diagnosis of Right Patellar Fracture Non-Displaced with Routine Healing. Pt presents with edema/effusion of the R knee, decreased R knee AROM, decreased RLE strength, decreased balance, and decreased WB ability on RLE that are causing difficulty with walking and other daily WB activities.    Pt prognosis is Good.   Pt will benefit from skilled outpatient Physical Therapy to address the deficits stated above and in the chart below, provide pt/family education, and to maximize pt's level of independence.     Plan of care discussed with patient: Yes  Pt's spiritual, cultural and educational needs considered and patient is agreeable to the plan of care and goals as stated below:     Anticipated Barriers for therapy: None    Medical Necessity is demonstrated by the following  History  Co-morbidities and personal factors that may impact the plan of care Co-morbidities:   See PMH for co-morbidities    Personal Factors:   no deficits     low   Examination  Body Structures and Functions, activity limitations and participation restrictions that may impact the plan of care Body Regions:   lower extremities    Body Systems:    ROM  strength  gross coordinated movement  balance  gait  transfers  motor control  edema    Participation Restrictions:   None    Activity limitations:   Learning and applying knowledge  no deficits    General Tasks and Commands  no deficits    Communication  no deficits    Mobility  lifting and carrying objects  walking  driving (bike, car, motorcycle)    Self care  dressing    Domestic Life  shopping  cooking  doing house work (cleaning house, washing dishes, laundry)  assisting others    Interactions/Relationships  no deficits    Life Areas  employment    Community and Social Life  community life  recreation and leisure         moderate   Clinical  Presentation stable and uncomplicated low   Decision Making/ Complexity Score: low     Goals:  Short Term Goals: 4 weeks   1. Patient will demonstrate improved AROM/PROM of R knee to 90 degrees flexion and 10 degrees hyperextension.  2. Patient will demonstrate improved quadriceps strength by being able to perform 5 SLR's with no signs of a knee extension lag on the RLE.  3. Patient will demonstrate improved R Knee Edema/Effusion to 36 cm or less at the mid-patella.  4. Patient will demonstrate improved functional ability by showing 50% or less limitation according to FOTO.    Long Term Goals: 8 weeks   1. Patient will demonstrate improved AROM of R Knee to 145 degrees knee flexion and 10 degrees hyperextension.  2. Patient will demonstrate improved quadriceps strength by being able to perform 10 SLR's with a 3 lbs. Cuff weight or more and no signs of a knee extension lag on the RLE.  3. Patient will demonstrate improved R Knee Edema/Effusion to 35.5 cm or less at the mid-patella.  4. Patient will demonstrate improved functional ability by showing 30% or less limitation according to FOTO.    Plan   Plan of care Certification: 11/1/2018 to 12-.    Outpatient Physical Therapy 1-2 times weekly for 8 weeks to include the following interventions: Electrical Stimulation (Russian Stimulation), Gait Training, Manual Therapy, Moist Heat/ Ice, Neuromuscular Re-ed, Patient Education, Therapeutic Activites and Therapeutic Exercise.     Noel Irwin, PT

## 2018-11-07 ENCOUNTER — CLINICAL SUPPORT (OUTPATIENT)
Dept: REHABILITATION | Facility: HOSPITAL | Age: 53
End: 2018-11-07
Attending: ORTHOPAEDIC SURGERY
Payer: COMMERCIAL

## 2018-11-07 DIAGNOSIS — M25.461 EFFUSION OF RIGHT KNEE: ICD-10-CM

## 2018-11-07 DIAGNOSIS — M62.81 MUSCLE WEAKNESS: ICD-10-CM

## 2018-11-07 PROCEDURE — 97110 THERAPEUTIC EXERCISES: CPT

## 2018-11-07 PROCEDURE — 97140 MANUAL THERAPY 1/> REGIONS: CPT

## 2018-11-07 NOTE — PROGRESS NOTES
Physical Therapy Daily Treatment Note     Name: Grace Tuttleell  Clinic Number: [id number]    Therapy Diagnosis:   Encounter Diagnoses   Name Primary?    Muscle weakness     Effusion of right knee      Physician: Jay Quintana MD    Visit Date: 11/7/2018    Physician Orders: PT Eval and Treat Right Knee  Medical Diagnosis from Referral: M25.561,G89.29 (ICD-10-CM) - Chronic pain of right knee  Evaluation Date: 11/1/2018  Authorization Period Expiration: 12-  Plan of Care Expiration: 12-    Visit # / Visits authorized: 2/25    Time In: 2:00 PM  Time Out: 3:04 PM  Total Billable Time: 54 minutes    Precautions: Standard and See Sticky Note    Subjective     Pt reports: that her knee is feeling much better and her swelling has reduced significantly. She states she has had to adjust her brace because it began moving on her so she had to tighten it..    She was compliant with home exercise program.  Response to previous treatment: No adverse impact  Functional change: No significant change    Pain: 1/10  Location: right knee      Objective     Grace received therapeutic exercises to develop strength, endurance, ROM and flexibility for 40 minutes including:  - RLE QS's - 45x5 sec.  - RLE Supine SLR's - 3x10  - RLE Supine SAQ's - 15x10 sec. Holds  - BLE Side-Lying Hip ABD - 2x15  - BLE Side-Lying Hip ADD - 2x15  - BLE Prone Hip Ext. - 2x15  - RLE Prone HS Curl 0 - 45 Degrees - 2x15    Grace received the following manual therapy techniques: Joint mobilizations and PROM Stretching were applied to the: R Knee for 14 minutes, including:  - Patellar Mobilizations in All Motions - Grade I - III  - Soft-Tissue Massage to R Quadriceps & Tissue around Patella  - Supine Knee Extension Stretch    Grace received cold pack for 10 minutes to R Knee.    Home Exercises Provided and Patient Education Provided     Written Home Exercises Provided: yes.  Exercises were reviewed and Grace was able to  demonstrate them prior to the end of the session.  Grace demonstrated good  understanding of the education provided.     See EMR under Media for exercises provided 11/7/2018.    Assessment     - During supine QS's the patient does demonstrate a good ability to achieve a hyperextended position and achieve a relatively good quadriceps contraction. However, when attempting to perform a SLR she is unable to maintain this hyperextension and falls into a knee extension lag due to the lack of quadriceps strength and motor control    Grace is progressing well towards her goals.   Pt prognosis is Excellent.     Pt will continue to benefit from skilled outpatient physical therapy to address the deficits listed in the problem list box on initial evaluation, provide pt/family education and to maximize pt's level of independence in the home and community environment.     Pt's spiritual, cultural and educational needs considered and pt agreeable to plan of care and goals.    Anticipated barriers to physical therapy: None    Goals:   Short Term Goals: 4 weeks   1. Patient will demonstrate improved AROM/PROM of R knee to 90 degrees flexion and 10 degrees hyperextension. (Progressing, NOT MET)  2. Patient will demonstrate improved quadriceps strength by being able to perform 5 SLR's with no signs of a knee extension lag on the RLE. (Progressing, NOT MET)  3. Patient will demonstrate improved R Knee Edema/Effusion to 36 cm or less at the mid-patella. (Progressing, NOT MET)  4. Patient will demonstrate improved functional ability by showing 50% or less limitation according to FOTO. (Progressing, NOT MET)     Long Term Goals: 8 weeks   1. Patient will demonstrate improved AROM of R Knee to 145 degrees knee flexion and 10 degrees hyperextension. (Progressing, NOT MET)  2. Patient will demonstrate improved quadriceps strength by being able to perform 10 SLR's with a 3 lbs. Cuff weight or more and no signs of a knee extension lag on  the RLE. (Progressing, NOT MET)  3. Patient will demonstrate improved R Knee Edema/Effusion to 35.5 cm or less at the mid-patella. (Progressing, NOT MET)  4. Patient will demonstrate improved functional ability by showing 30% or less limitation according to FOTO. (Progressing, NOT MET)    Plan     Continue to progress patient per MD Notes in Sticky Note.    Noel Irwin, PT

## 2018-11-15 ENCOUNTER — HOSPITAL ENCOUNTER (OUTPATIENT)
Dept: RADIOLOGY | Facility: HOSPITAL | Age: 53
Discharge: HOME | End: 2018-11-15
Attending: ORTHOPAEDIC SURGERY
Payer: COMMERCIAL

## 2018-11-15 ENCOUNTER — CLINICAL SUPPORT (OUTPATIENT)
Dept: REHABILITATION | Facility: HOSPITAL | Age: 53
End: 2018-11-15
Attending: ORTHOPAEDIC SURGERY
Payer: COMMERCIAL

## 2018-11-15 ENCOUNTER — OFFICE VISIT (OUTPATIENT)
Dept: SPORTS MEDICINE | Facility: CLINIC | Age: 53
End: 2018-11-15
Payer: COMMERCIAL

## 2018-11-15 VITALS
BODY MASS INDEX: 23.9 KG/M2 | HEART RATE: 66 BPM | WEIGHT: 140 LBS | HEIGHT: 64 IN | DIASTOLIC BLOOD PRESSURE: 80 MMHG | SYSTOLIC BLOOD PRESSURE: 118 MMHG

## 2018-11-15 DIAGNOSIS — M62.81 MUSCLE WEAKNESS: ICD-10-CM

## 2018-11-15 DIAGNOSIS — M25.461 EFFUSION OF RIGHT KNEE: ICD-10-CM

## 2018-11-15 DIAGNOSIS — M25.561 RIGHT KNEE PAIN, UNSPECIFIED CHRONICITY: ICD-10-CM

## 2018-11-15 DIAGNOSIS — S82.034D CLOSED NONDISPLACED TRANSVERSE FRACTURE OF RIGHT PATELLA WITH ROUTINE HEALING, SUBSEQUENT ENCOUNTER: ICD-10-CM

## 2018-11-15 DIAGNOSIS — M25.561 RIGHT KNEE PAIN, UNSPECIFIED CHRONICITY: Primary | ICD-10-CM

## 2018-11-15 PROCEDURE — 73560 X-RAY EXAM OF KNEE 1 OR 2: CPT | Mod: TC,FY,PO,RT

## 2018-11-15 PROCEDURE — 73560 X-RAY EXAM OF KNEE 1 OR 2: CPT | Mod: 26,RT,, | Performed by: RADIOLOGY

## 2018-11-15 PROCEDURE — 3008F BODY MASS INDEX DOCD: CPT | Mod: CPTII,S$GLB,, | Performed by: ORTHOPAEDIC SURGERY

## 2018-11-15 PROCEDURE — 97110 THERAPEUTIC EXERCISES: CPT

## 2018-11-15 PROCEDURE — 99214 OFFICE O/P EST MOD 30 MIN: CPT | Mod: S$GLB,,, | Performed by: ORTHOPAEDIC SURGERY

## 2018-11-15 PROCEDURE — 97140 MANUAL THERAPY 1/> REGIONS: CPT

## 2018-11-15 PROCEDURE — 99999 PR PBB SHADOW E&M-EST. PATIENT-LVL III: CPT | Mod: PBBFAC,,, | Performed by: ORTHOPAEDIC SURGERY

## 2018-11-15 NOTE — PROGRESS NOTES
CC: Right knee pain    53 y.o. Female returns to clinic for repeat xrays to evaluate patella fracture healing.    History of right knee pain since 10/4/18. She slipped on dog pee in her home and landed directly on her right kneecap. She later was crossing the street on crutches and slipped on rodriguez, falling again. She then went to urgent care and was diagnosed with a transverse patella fracture. She has been ambulating with crutches. No prior knee injury or surgery. She states that the pain is severe and not responding to any conservative care.      REVIEW OF SYSTEMS:   Constitution: Negative. Negative for chills, fever and night sweats.   HENT: Negative for congestion and headaches.    Eyes: Negative for blurred vision, left vision loss and right vision loss.   Cardiovascular: Negative for chest pain and syncope.   Respiratory: Negative for cough and shortness of breath.    Endocrine: Negative for polydipsia, polyphagia and polyuria.   Hematologic/Lymphatic: Negative for bleeding problem. Does not bruise/bleed easily.   Skin: Negative for dry skin, itching and rash.   Musculoskeletal: Negative for falls. Positive for right knee pain and  muscle weakness.   Gastrointestinal: Negative for abdominal pain and bowel incontinence.   Genitourinary: Negative for bladder incontinence and nocturia.   Neurological: Negative for disturbances in coordination, loss of balance and seizures.   Psychiatric/Behavioral: Negative for depression. The patient does not have insomnia.    Allergic/Immunologic: Negative for hives and persistent infections.     PAST MEDICAL HISTORY:   Past Medical History:   Diagnosis Date    Acne     Dermatologist prescribes spironolactone     Anxiety     celexa 40    Chronic constipation 5/24/2017    Hot flashes due to menopause 5/24/2017    Osteopenia of spine 5/24/2017    Primary osteoarthritis of left hip 05/24/2017    dry needling at [facility], MRI 2016, Injection didn't help much     Right elbow pain 07/20/2017    dry needling at [facility]       PAST SURGICAL HISTORY:   Past Surgical History:   Procedure Laterality Date    INJECTION-JOINT Left 2/2/2015    Performed [facility] Fitzpatrick [facility] at [facility]    right hip surgery      April 24, 2012       FAMILY HISTORY:   Family History   Problem Relation Age of Onset    Hypertension Mother     Hyperlipidemia Mother     Osteoporosis Mother     Breast cancer Maternal Aunt 65       SOCIAL HISTORY:   Social History     Socioeconomic History    Marital status: Single     Spouse name: Not on file    Number of children: Not on file    Years of education: Not on file    Highest education level: Not on file   Social Needs    Financial resource strain: Not on file    Food insecurity - worry: Not on file    Food insecurity - inability: Not on file    Transportation needs - medical: Not on file    Transportation needs - non-medical: Not on file   Occupational History    Not on file   Tobacco Use    Smoking status: Never Smoker    Smokeless tobacco: Never Used   Substance and Sexual Activity    Alcohol use: Yes     Alcohol/week: 2.4 oz     Types: 4 Standard drinks or equivalent per week     Comment: 4 beers a week    Drug use: No    Sexual activity: Yes     Partners: Female   Other Topics Concern    Not on file   Social History Narrative     with EMERITA (works with HIV, Hepatitis C), female partner    2 daughters  - one is a competitive cheerleader with [employer]), 1 son (Yves)    Nonsmoker, ETOH 4 x / week    GYN [facility], never had colonoscopy       MEDICATIONS:     Current Outpatient Medications:     citalopram (CELEXA) 40 MG tablet, take 1 tablet by mouth once daily, Disp: 90 tablet, Rfl: 3    ALLERGIES:   Review of patient's allergies indicates:   Allergen Reactions    Codeine      Other reaction(s): Nausea    Hydrocodone      Other reaction(s): Vomiting  Other reaction(s): Nausea    Promethazine      Other  "reaction(s): Vomiting  Other reaction(s): Nausea       VITAL SIGNS:   /80   Pulse 66   Ht 5' 4" (1.626 m)   Wt 63.5 kg (140 lb)   LMP 11/29/2016 (Approximate)   BMI 24.03 kg/m²      PHYSICAL EXAMINATION:  General:  The patient is alert and oriented x 3.  Mood is pleasant.  Observation of ears, eyes and nose reveal no gross abnormalities.  No labored breathing observed.    RIGHT KNEE EXAMINATION     OBSERVATION / INSPECTION   Gait:   antalgic   Alignment:  Neutral   Scars:   None   Muscle atrophy: Mild  Effusion:  moderate   Warmth:  None   Discoloration:   none     TENDERNESS / CREPITUS (T / C):          T / C      T / C   Patella   + / -   Lateral joint line   - / -   Peripatellar medial  -  Medial joint line    - / -   Peripatellar lateral -  Medial plica   - / -   Patellar tendon +   Popliteal fossa   - / -   Quad tendon   -   Gastrocnemius   -   Prepatellar Bursa - / -   Quadricep   -   Tibial tubercle  -  Thigh/hamstring  -   Pes anserine/HS -  Fibula    -   ITB   - / -  Tibia     -   Tib/fib joint  - / -  LCL    -     MFC   - / -   MCL: Proximal  -    LFC   - / -    Distal   -          ROM: (* = pain)  PASSIVE   ACTIVE    Left :   0 / 0 / 145   0 / 0 / 140     Right :    0 / 0 / 45   0 / 0 / 45    Patellofemoral examination:  See above noted areas of tenderness.   Patella position    Subluxation / dislocation: Centered           Sup. / Inf;   Normal   Crepitus (PF):    NT   Patellar Mobility:       Medial-lateral:   Normal    Superior-inferior:  Normal    Inferior tilt   Normal    Patellar tendon:  Normal   Lateral tilt:    Normal   J-sign:     NT   Patellofemoral grind:   NT      MENISCAL SIGNS:     Pain on terminal extension:  -  Pain on terminal flexion:  NT  Kirills maneuver:  NT  Squat     NT    LIGAMENT EXAMINATION:  ACL / Lachman:  normal (-1 to 2mm)    PCL-Post.  drawer: normal 0 to 2mm  MCL- Valgus:  normal 0 to 2mm  LCL- Varus:  normal 0 to 2mm  Pivot shift: normal (Equal)   Dial " Test: difference c/w other side   At 30° flexion: normal (< 5°)    At 90° flexion: normal (< 5°)   Reverse Pivot Shift:   normal (Equal)     STRENGTH: (* = with pain) PAINFUL SIDE   Quadricep   NT   Hamstring:   NT    EXTREMITY NEURO-VASCULAR EXAMINATION:   Sensation:  Grossly intact to light touch all dermatomal regions.   Motor Function:  Fully intact motor function at hip, knee, foot and ankle    DTRs;  quadriceps and  achilles 2+.  No clonus and downgoing Babinski.    Vascular status:  DP and PT pulses 2+, brisk capillary refill, symmetric.     OTHER FINDINGS:  + able to perform a straight leg raise against gravity    XRAY (11/15/18): Fracture through the patella is again seen in satisfactory position and alignment.  No joint effusion is noted.  Mild bony demineralization is seen.     ASSESSMENT:    Right Nondisplaced Patellar Fracture, healing    PLAN:   1. DC t-scope today.  Transition to short runner (patient has one at home that she has from previous urgent care visit)  2. Follow up 4-6 weeks with new xrays (AP/lateral views only)

## 2018-11-15 NOTE — PROGRESS NOTES
Physical Therapy Daily Treatment Note     Name: Grace Beltran  Clinic Number: [id number]    Therapy Diagnosis:   Encounter Diagnoses   Name Primary?    Muscle weakness     Effusion of right knee      Physician: Jay Quintana MD    Visit Date: 11/15/2018    Physician Orders: PT Eval and Treat Right Knee  Medical Diagnosis from Referral: M25.561,G89.29 (ICD-10-CM) - Chronic pain of right knee  Evaluation Date: 11/1/2018  Authorization Period Expiration: 12-  Plan of Care Expiration: 12-    Visit # / Visits authorized: 3/25    Time In: 2:00 PM  Time Out: 3:10 PM  Total Billable Time: 60 minutes    Precautions: Standard and See Sticky Note    Subjective     Pt reports: that her knee is feeling much better. She states she notices she's bending her knee more and walking more. She states she is seeing her MD today and will be getting an x-ray.    She was compliant with home exercise program.  Response to previous treatment: No adverse impact  Functional change: No significant change    Pain: 1/10  Location: right knee      Objective     Grace received therapeutic exercises to develop strength, endurance, ROM and flexibility for 45 minutes including:  - RLE Heel Slides within 0-90 Degrees [id number]. holds  - RLE QS's - 15x10 sec.  - RLE Supine SLR's - 3x15  - RLE Heel Sets 15x10 sec holds  - RLE Supine SAQ's - 15x10 sec. Holds  - BLE Side-Lying Hip ABD - 3x15  - BLE Side-Lying Hip ADD - 3x15  - BLE Prone Hip Ext. - 3x15  - RLE Prone HS Curl within 0 - 90 Degrees - 3x15    Grace received the following manual therapy techniques: Joint mobilizations and PROM Stretching were applied to the: R Knee for 15 minutes, including:  - Patellar Mobilizations in All Motions - Grade I - III  - Soft-Tissue Massage to R Quadriceps & Tissue around Patella  - Supine Knee Extension Stretch    Grace received cold pack for 10 minutes to R Knee.    Home Exercises Provided and Patient Education Provided      Written Home Exercises Provided: yes.  Exercises were reviewed and Grace was able to demonstrate them prior to the end of the session.  Grace demonstrated good  understanding of the education provided.     See EMR under Media for exercises provided 11/7/2018.    Assessment     Adjusted brace today, 0-90 degrees. Patient demonstrated bilateral hip weakness with exercises today. She had initial tightness with heel slides within new ROM but loosened up as she progressed.    Grace is progressing well towards her goals.   Pt prognosis is Excellent.     Pt will continue to benefit from skilled outpatient physical therapy to address the deficits listed in the problem list box on initial evaluation, provide pt/family education and to maximize pt's level of independence in the home and community environment.     Pt's spiritual, cultural and educational needs considered and pt agreeable to plan of care and goals.    Anticipated barriers to physical therapy: None    Goals:   Short Term Goals: 4 weeks   1. Patient will demonstrate improved AROM/PROM of R knee to 90 degrees flexion and 10 degrees hyperextension. (Progressing, NOT MET)  2. Patient will demonstrate improved quadriceps strength by being able to perform 5 SLR's with no signs of a knee extension lag on the RLE. (Progressing, NOT MET)  3. Patient will demonstrate improved R Knee Edema/Effusion to 36 cm or less at the mid-patella. (Progressing, NOT MET)  4. Patient will demonstrate improved functional ability by showing 50% or less limitation according to FOTO. (Progressing, NOT MET)     Long Term Goals: 8 weeks   1. Patient will demonstrate improved AROM of R Knee to 145 degrees knee flexion and 10 degrees hyperextension. (Progressing, NOT MET)  2. Patient will demonstrate improved quadriceps strength by being able to perform 10 SLR's with a 3 lbs. Cuff weight or more and no signs of a knee extension lag on the RLE. (Progressing, NOT MET)  3. Patient will  demonstrate improved R Knee Edema/Effusion to 35.5 cm or less at the mid-patella. (Progressing, NOT MET)  4. Patient will demonstrate improved functional ability by showing 30% or less limitation according to FOTO. (Progressing, NOT MET)    Plan     Continue to progress patient per MD Notes in Sticky Note.    Jenna Irwin, PT, DPT

## 2018-11-21 ENCOUNTER — CLINICAL SUPPORT (OUTPATIENT)
Dept: REHABILITATION | Facility: HOSPITAL | Age: 53
End: 2018-11-21
Attending: ORTHOPAEDIC SURGERY
Payer: COMMERCIAL

## 2018-11-21 DIAGNOSIS — M62.81 MUSCLE WEAKNESS: ICD-10-CM

## 2018-11-21 DIAGNOSIS — M25.461 EFFUSION OF RIGHT KNEE: ICD-10-CM

## 2018-11-21 PROCEDURE — 97110 THERAPEUTIC EXERCISES: CPT

## 2018-11-21 NOTE — PROGRESS NOTES
Physical Therapy Daily Treatment Note     Name: Grace Beltran  Clinic Number: [id number]    Therapy Diagnosis:   Encounter Diagnoses   Name Primary?    Muscle weakness     Effusion of right knee      Physician: Jay Quintana MD    Visit Date: 11/21/2018    Physician Orders: PT Eval and Treat Right Knee  Medical Diagnosis from Referral: M25.561,G89.29 (ICD-10-CM) - Chronic pain of right knee  Evaluation Date: 11/1/2018  Authorization Period Expiration: 12-  Plan of Care Expiration: 12-    Visit # / Visits authorized: 3/25    Time In: 2:10 PM  Time Out: 3:05 PM  Total Billable Time: 55 minutes    Precautions: Standard and See Precautions Below  - First 2 weeks 0 - 45 AROM/PROM allowed and quad/hip strengthening  - Second 2 weeks 0 - 90 AROM/PROM allowed and continue strengthening  - WBAT in T-Scope Brace  - As of 11- according to patient per MD she is allowed to perform knee flexion to tolerance and is to maintain WBAT in the brace    Subjective     Pt reports: that her knee continues to feel better. She said that right after she left her last treatment session the MD had a new smaller, flexibile knee brace and told her that she can bend her knee to tolerance now. She also stated that the MD stated that her patella is healing well.    She was compliant with home exercise program.    Response to previous treatment: No adverse impact  Functional change: No significant change    Pain: 2/10  Location: right knee      Objective     Grace received therapeutic exercises to develop strength, endurance, ROM and flexibility for 55 minutes including:  - R AROM Heel Slides on Board - 3 min.  - R QS's w/ Heel Propped - 30x5 sec.  - R Heel Sets - 30x5 sec.  - R Supine SLR's w/ Heel Propped - 3x15  - R Prone QS's - 30x5 sec.  - R Standing HS Curls w/ Thigh Against Table - 3x12 w/ 2 lbs. CW  - (Brace On) R Standing Bilateral Heel Raises - 3x10  - (Brace On) Double-Leg Balance on Airex w/  Rebounder Ball Toss - 1x20 w/ Blue MedBall  - (Brace On) R Assisted SLS w/ Rebounder Ball Toss - 2x20 w/ Blue MedBall  - Fwd Step-Over [place]one Walking - 20x  - Lateral Squat Walks - 4 Turf Lengths w/ [place] TB    Grace received the following manual therapy techniques: Joint mobilizations and PROM Stretching were applied to the: R Knee for 0 minutes, including:  - None performed today    Home Exercises Provided and Patient Education Provided     Written Home Exercises Provided: yes.  Exercises were reviewed and Grace was able to demonstrate them prior to the end of the session.  Grace demonstrated good  understanding of the education provided.     See EMR under Media for exercises provided 11/7/2018.    Assessment     - Patient is demonstrating good activity tolerance with WB activities as well as fair balance. She does need to improve her ankle and hip strategy while balancing to assist with daily activities and stability in the RLE. While ambulating the patient hikes the R hip and circumducts slightly. However, after having her perform gait with stepping over the cones and focusing on maintaining neutral hips and achieving full knee flexion and extension she demonstrated a reduction in these compensations. She was instructed to perform this type of gait at home as practice to improve her mechanics and she demonstrated an understanding of this instruction.    Grace is progressing well towards her goals.   Pt prognosis is Excellent.     Pt will continue to benefit from skilled outpatient physical therapy to address the deficits listed in the problem list box on initial evaluation, provide pt/family education and to maximize pt's level of independence in the home and community environment.     Pt's spiritual, cultural and educational needs considered and pt agreeable to plan of care and goals.    Anticipated barriers to physical therapy: None    Goals:   Short Term Goals: 4 weeks   1. Patient will demonstrate  "improved AROM/PROM of R knee to 90 degrees flexion and 10 degrees hyperextension. (Progressing, NOT MET)  2. Patient will demonstrate improved quadriceps strength by being able to perform 5 SLR's with no signs of a knee extension lag on the RLE. (Progressing, NOT MET)  3. Patient will demonstrate improved R Knee Edema/Effusion to 36 cm or less at the mid-patella. (Progressing, NOT MET)  4. Patient will demonstrate improved functional ability by showing 50% or less limitation according to FOTO. (Progressing, NOT MET)     Long Term Goals: 8 weeks   1. Patient will demonstrate improved AROM of R Knee to 145 degrees knee flexion and 10 degrees hyperextension. (Progressing, NOT MET)  2. Patient will demonstrate improved quadriceps strength by being able to perform 10 SLR's with a 3 lbs. Cuff weight or more and no signs of a knee extension lag on the RLE. (Progressing, NOT MET)  3. Patient will demonstrate improved R Knee Edema/Effusion to 35.5 cm or less at the mid-patella. (Progressing, NOT MET)  4. Patient will demonstrate improved functional ability by showing 30% or less limitation according to FOTO. (Progressing, NOT MET)    Plan     Continue to progress patient per MD Notes in "Precautions."     Noel Irwin, PT, DPT  "

## 2018-11-26 ENCOUNTER — CLINICAL SUPPORT (OUTPATIENT)
Dept: REHABILITATION | Facility: HOSPITAL | Age: 53
End: 2018-11-26
Attending: ORTHOPAEDIC SURGERY
Payer: COMMERCIAL

## 2018-11-26 DIAGNOSIS — M25.461 EFFUSION OF RIGHT KNEE: ICD-10-CM

## 2018-11-26 DIAGNOSIS — M62.81 MUSCLE WEAKNESS: ICD-10-CM

## 2018-11-26 PROCEDURE — 97110 THERAPEUTIC EXERCISES: CPT

## 2018-11-26 NOTE — PROGRESS NOTES
Physical Therapy Daily Treatment Note     Name: Grace Beltran  Clinic Number: [id number]    Therapy Diagnosis:   Encounter Diagnoses   Name Primary?    Muscle weakness     Effusion of right knee      Physician: Jay Quintana MD    Visit Date: 11/26/2018    Physician Orders: PT Eval and Treat Right Knee  Medical Diagnosis from Referral: M25.561,G89.29 (ICD-10-CM) - Chronic pain of right knee  Evaluation Date: 11/1/2018  Authorization Period Expiration: 12-  Plan of Care Expiration: 12-    Visit # / Visits authorized: 4/25    Time In: 3:11 PM  Time Out: 3:56 PM  Total Billable Time: 44 minutes    Precautions: Standard and See Precautions Below  - First 2 weeks 0 - 45 AROM/PROM allowed and quad/hip strengthening  - Second 2 weeks 0 - 90 AROM/PROM allowed and continue strengthening  - WBAT in T-Scope Brace  - As of 11- according to patient per MD she is allowed to perform knee flexion to tolerance and is to maintain WBAT in the brace    Subjective     Pt reports: that she is doing much better. She states that going down stairs is the hardest thing for her to do due to the bending. She also states she has been able to drive safely.    She was compliant with home exercise program.    Response to previous treatment: No adverse impact  Functional change: 54% Limitation according to FOTO today compared to 72% at Intake    Pain: 1/10  Location: right knee      Objective     Grace received therapeutic exercises to develop strength, endurance, ROM and flexibility for 44 minutes including:  - R AROM Heel Slides on Board - 3 min.  - R QS's w/ Heel Propped - 30x5 sec. Holds  - R Long-Sitting SLR's w/ Heel Propped - 30x2 sec. Holds  - Supine Bridges - 30x2 sec. Holds  - R Heel Sets - 30x2 sec. Holds  - R Prone QS's - 30x5 sec. Holds  - R Standing TKE's - 30x2 sec. Holds w/ Green TB  - (Brace On) Standing Bilateral Heel Raises - 4x10  - (Brace On) Standing Assisted Fwd Step-Downs to Tolerance -  3x10    Grace received the following manual therapy techniques: Joint mobilizations and PROM Stretching were applied to the: R Knee for 0 minutes, including:  - None performed today    Home Exercises Provided and Patient Education Provided     Written Home Exercises Provided: yes.  Exercises were reviewed and Grace was able to demonstrate them prior to the end of the session.  Grace demonstrated good  understanding of the education provided.     See EMR under Media for exercises provided 11/7/2018.    Assessment     - Patient has significant difficulty with forward step-downs secondary to patellar pain and poor eccentric quadriceps control. This will continue to progressed in order to allow the patient to return to a reciprocal gait pattern with stairs as she currently is using a step-to gait pattern when descending stairs.    Grace is progressing well towards her goals.   Pt prognosis is Excellent.     Pt will continue to benefit from skilled outpatient physical therapy to address the deficits listed in the problem list box on initial evaluation, provide pt/family education and to maximize pt's level of independence in the home and community environment.     Pt's spiritual, cultural and educational needs considered and pt agreeable to plan of care and goals.    Anticipated barriers to physical therapy: None    Goals:   Short Term Goals: 4 weeks   1. Patient will demonstrate improved AROM/PROM of R knee to 90 degrees flexion and 10 degrees hyperextension. (Progressing, NOT MET)  2. Patient will demonstrate improved quadriceps strength by being able to perform 5 SLR's with no signs of a knee extension lag on the RLE. (Progressing, NOT MET)  3. Patient will demonstrate improved R Knee Edema/Effusion to 36 cm or less at the mid-patella. (Progressing, NOT MET)  4. Patient will demonstrate improved functional ability by showing 50% or less limitation according to FOTO. (Progressing, NOT MET)     Long Term Goals:  "8 weeks   1. Patient will demonstrate improved AROM of R Knee to 145 degrees knee flexion and 10 degrees hyperextension. (Progressing, NOT MET)  2. Patient will demonstrate improved quadriceps strength by being able to perform 10 SLR's with a 3 lbs. Cuff weight or more and no signs of a knee extension lag on the RLE. (Progressing, NOT MET)  3. Patient will demonstrate improved R Knee Edema/Effusion to 35.5 cm or less at the mid-patella. (Progressing, NOT MET)  4. Patient will demonstrate improved functional ability by showing 30% or less limitation according to FOTO. (Progressing, NOT MET)    Plan     Continue to progress patient per MD Notes in "Precautions."     Noel Irwin, PT, DPT  "

## 2018-12-05 ENCOUNTER — CLINICAL SUPPORT (OUTPATIENT)
Dept: REHABILITATION | Facility: HOSPITAL | Age: 53
End: 2018-12-05
Attending: ORTHOPAEDIC SURGERY
Payer: COMMERCIAL

## 2018-12-05 DIAGNOSIS — M62.81 MUSCLE WEAKNESS: ICD-10-CM

## 2018-12-05 DIAGNOSIS — M25.461 EFFUSION OF RIGHT KNEE: ICD-10-CM

## 2018-12-05 PROCEDURE — 97110 THERAPEUTIC EXERCISES: CPT

## 2018-12-05 NOTE — PROGRESS NOTES
Physical Therapy Daily Treatment Note     Name: Grace Beltran  Clinic Number: [id number]    Therapy Diagnosis:   Encounter Diagnoses   Name Primary?    Muscle weakness     Effusion of right knee      Physician: Jay Quintana MD    Visit Date: 12/5/2018    Physician Orders: PT Eval and Treat Right Knee  Medical Diagnosis from Referral: M25.561,G89.29 (ICD-10-CM) - Chronic pain of right knee  Evaluation Date: 11/1/2018  Authorization Period Expiration: 12-  Plan of Care Expiration: 12-    Visit # / Visits authorized: 5/25    Time In: 2:22 PM  Time Out: 3:02 PM  Total Billable Time: 38 minutes    Precautions: Standard and See Precautions Below  - First 2 weeks 0 - 45 AROM/PROM allowed and quad/hip strengthening  - Second 2 weeks 0 - 90 AROM/PROM allowed and continue strengthening  - WBAT in Brace  - As of 11- according to patient per MD she is allowed to perform knee flexion to tolerance and is to maintain WBAT in the brace    Subjective     Pt reports: that she continues to feel better each time. She said that stairs are still very difficult for her.    She was compliant with home exercise program.    Response to previous treatment: No adverse impact  Functional change: Improved knee extension ability    Pain: 1/10  Location: right knee      Objective     Grace received therapeutic exercises to develop strength, endurance, ROM and flexibility for 38 minutes including:  - R Long-Sitting QS's w/ Heel Propped - 30x5 sec.  - R Supine SLR's w/ Heel Propped - 3x10  - R Long-Sitting SLR's w/ Heel Propped - 3x10  - Supine Bridges - 30x2 sec. Holds  - R Heel Sets - 30x5 sec. Holds  - R Standing TKE's - 30x2 sec. Holds w/ Maroon TB  - (Brace On) Standing Bilateral Heel Raises - 4x10  - (Brace On) Standing Assisted Fwd Step-Downs to Tolerance - 3x10  - (Brace On) Pilates Box Assisted Step-Ups - 3x10 w/ 4 Coils & BUE Assist  - (Brace On) R SLS w/ Reaching Cone Taps - 1xFatigue     Grace  received the following manual therapy techniques: Joint mobilizations and PROM Stretching were applied to the: R Knee for 0 minutes, including:  - None performed today    Home Exercises Provided and Patient Education Provided     Written Home Exercises Provided: yes.  Exercises were reviewed and Grace was able to demonstrate them prior to the end of the session.  Grace demonstrated good  understanding of the education provided.     See EMR under Media for exercises provided 11/7/2018.    Assessment     - Patient has slight knee extension lag with SLR's secondary to decreased quadriceps strength and motor control. Despite the lag the stability she does have present is better than previous treatment sessions. She will continue to benefit from a focus on improving quadriceps strength.    Grace is progressing well towards her goals.   Pt prognosis is Excellent.     Pt will continue to benefit from skilled outpatient physical therapy to address the deficits listed in the problem list box on initial evaluation, provide pt/family education and to maximize pt's level of independence in the home and community environment.     Pt's spiritual, cultural and educational needs considered and pt agreeable to plan of care and goals.    Anticipated barriers to physical therapy: None    Goals:   Short Term Goals: 4 weeks   1. Patient will demonstrate improved AROM/PROM of R knee to 90 degrees flexion and 10 degrees hyperextension. (Progressing, NOT MET)  2. Patient will demonstrate improved quadriceps strength by being able to perform 5 SLR's with no signs of a knee extension lag on the RLE. (Progressing, NOT MET)  3. Patient will demonstrate improved R Knee Edema/Effusion to 36 cm or less at the mid-patella. (Progressing, NOT MET)  4. Patient will demonstrate improved functional ability by showing 50% or less limitation according to FOTO. (Progressing, NOT MET)     Long Term Goals: 8 weeks   1. Patient will demonstrate  "improved AROM of R Knee to 145 degrees knee flexion and 10 degrees hyperextension. (Progressing, NOT MET)  2. Patient will demonstrate improved quadriceps strength by being able to perform 10 SLR's with a 3 lbs. Cuff weight or more and no signs of a knee extension lag on the RLE. (Progressing, NOT MET)  3. Patient will demonstrate improved R Knee Edema/Effusion to 35.5 cm or less at the mid-patella. (Progressing, NOT MET)  4. Patient will demonstrate improved functional ability by showing 30% or less limitation according to FOTO. (Progressing, NOT MET)    Plan     Continue to progress patient per MD Notes in "Precautions."     Noel Irwin, PT, DPT  "

## 2018-12-10 ENCOUNTER — PATIENT MESSAGE (OUTPATIENT)
Dept: SPORTS MEDICINE | Facility: CLINIC | Age: 53
End: 2018-12-10

## 2018-12-18 ENCOUNTER — HOSPITAL ENCOUNTER (OUTPATIENT)
Dept: RADIOLOGY | Facility: HOSPITAL | Age: 53
Discharge: HOME | End: 2018-12-18
Attending: ORTHOPAEDIC SURGERY
Payer: COMMERCIAL

## 2018-12-18 ENCOUNTER — OFFICE VISIT (OUTPATIENT)
Dept: SPORTS MEDICINE | Facility: CLINIC | Age: 53
End: 2018-12-18
Payer: COMMERCIAL

## 2018-12-18 VITALS
SYSTOLIC BLOOD PRESSURE: 122 MMHG | WEIGHT: 140 LBS | HEIGHT: 64 IN | DIASTOLIC BLOOD PRESSURE: 86 MMHG | BODY MASS INDEX: 23.9 KG/M2 | HEART RATE: 60 BPM

## 2018-12-18 DIAGNOSIS — S82.034D CLOSED NONDISPLACED TRANSVERSE FRACTURE OF RIGHT PATELLA WITH ROUTINE HEALING: ICD-10-CM

## 2018-12-18 DIAGNOSIS — M25.561 RIGHT KNEE PAIN, UNSPECIFIED CHRONICITY: ICD-10-CM

## 2018-12-18 DIAGNOSIS — M25.561 RIGHT KNEE PAIN, UNSPECIFIED CHRONICITY: Primary | ICD-10-CM

## 2018-12-18 PROCEDURE — 99999 PR PBB SHADOW E&M-EST. PATIENT-LVL III: CPT | Mod: PBBFAC,,, | Performed by: ORTHOPAEDIC SURGERY

## 2018-12-18 PROCEDURE — 73560 X-RAY EXAM OF KNEE 1 OR 2: CPT | Mod: 26,RT,, | Performed by: RADIOLOGY

## 2018-12-18 PROCEDURE — 99214 OFFICE O/P EST MOD 30 MIN: CPT | Mod: S$GLB,,, | Performed by: ORTHOPAEDIC SURGERY

## 2018-12-18 PROCEDURE — 3008F BODY MASS INDEX DOCD: CPT | Mod: CPTII,S$GLB,, | Performed by: ORTHOPAEDIC SURGERY

## 2018-12-18 PROCEDURE — 73560 X-RAY EXAM OF KNEE 1 OR 2: CPT | Mod: TC,FY,PO,RT

## 2018-12-18 NOTE — PROGRESS NOTES
CC: Right knee pain  Interval History: 53 y.o. Female returns to clinic for repeat xrays to evaluate patella fracture healing. She is now 10.5 weeks from injury. Pain has improved substantially. Still has trouble with stairs due to quad weakness. Notes altered gait, which has increased her baseline left hip pain. She is working with Jovanni in PT at [facility]      HPI: History of right knee pain since 10/4/18. She slipped on dog pee in her home and landed directly on her right kneecap. She later was crossing the street on crutches and slipped on rodriguez, falling again. She then went to urgent care and was diagnosed with a transverse patella fracture. She has been ambulating with crutches. No prior knee injury or surgery. She states that the pain is severe and not responding to any conservative care.      REVIEW OF SYSTEMS:   Constitution: Negative. Negative for chills, fever and night sweats.   HENT: Negative for congestion and headaches.    Eyes: Negative for blurred vision, left vision loss and right vision loss.   Cardiovascular: Negative for chest pain and syncope.   Respiratory: Negative for cough and shortness of breath.    Endocrine: Negative for polydipsia, polyphagia and polyuria.   Hematologic/Lymphatic: Negative for bleeding problem. Does not bruise/bleed easily.   Skin: Negative for dry skin, itching and rash.   Musculoskeletal: Negative for falls. Positive for right knee pain and  muscle weakness.   Gastrointestinal: Negative for abdominal pain and bowel incontinence.   Genitourinary: Negative for bladder incontinence and nocturia.   Neurological: Negative for disturbances in coordination, loss of balance and seizures.   Psychiatric/Behavioral: Negative for depression. The patient does not have insomnia.    Allergic/Immunologic: Negative for hives and persistent infections.     PAST MEDICAL HISTORY:   Past Medical History:   Diagnosis Date    Acne     Dermatologist prescribes spironolactone      Anxiety     celexa 40    Chronic constipation 5/24/2017    Hot flashes due to menopause 5/24/2017    Osteopenia of spine 5/24/2017    Primary osteoarthritis of left hip 05/24/2017    dry needling at [facility], MRI 2016, Injection didn't help much    Right elbow pain 07/20/2017    dry needling at [facility]       PAST SURGICAL HISTORY:   Past Surgical History:   Procedure Laterality Date    INJECTION-JOINT Left 2/2/2015    [facility]    right hip surgery      April 24, 2012       FAMILY HISTORY:   Family History   Problem Relation Age of Onset    Hypertension Mother     Hyperlipidemia Mother     Osteoporosis Mother     Breast cancer Maternal Aunt 65       SOCIAL HISTORY:   Social History     Socioeconomic History    Marital status: Single     Spouse name: Not on file    Number of children: Not on file    Years of education: Not on file    Highest education level: Not on file   Social Needs    Financial resource strain: Not on file    Food insecurity - worry: Not on file    Food insecurity - inability: Not on file    Transportation needs - medical: Not on file    Transportation needs - non-medical: Not on file   Occupational History    Not on file   Tobacco Use    Smoking status: Never Smoker    Smokeless tobacco: Never Used   Substance and Sexual Activity    Alcohol use: Yes     Alcohol/week: 2.4 oz     Types: 4 Standard drinks or equivalent per week     Comment: 4 beers a week    Drug use: No    Sexual activity: Yes     Partners: Female   Other Topics Concern    Not on file   Social History Narrative     with EMERITA (works with HIV, Hepatitis C), female partner    2 daughters  - one is a competitive cheerleader with [employer] ([place]), 1 son (Yves)    Nonsmoker, ETOH 4 x / week    GYN [facility], never had colonoscopy       MEDICATIONS:     Current Outpatient Medications:     citalopram (CELEXA) 40 MG tablet, take 1 tablet by  "mouth once daily, Disp: 90 tablet, Rfl: 3    ALLERGIES:   Review of patient's allergies indicates:   Allergen Reactions    Codeine      Other reaction(s): Nausea    Hydrocodone      Other reaction(s): Vomiting  Other reaction(s): Nausea    Promethazine      Other reaction(s): Vomiting  Other reaction(s): Nausea       VITAL SIGNS:   /86   Pulse 60   Ht 5' 4" (1.626 m)   Wt 63.5 kg (140 lb)   LMP 11/29/2016 (Approximate)   BMI 24.03 kg/m²      PHYSICAL EXAMINATION:  General:  The patient is alert and oriented x 3.  Mood is pleasant.  Observation of ears, eyes and nose reveal no gross abnormalities.  No labored breathing observed.    RIGHT KNEE EXAMINATION     OBSERVATION / INSPECTION   Gait:   antalgic   Alignment:  Neutral   Scars:   None   Muscle atrophy: Mild  Effusion:  moderate   Warmth:  None   Discoloration:   none     TENDERNESS / CREPITUS (T / C):          T / C      T / C   Patella   + / -, improving Lateral joint line   - / -   Peripatellar medial  -  Medial joint line    - / -   Peripatellar lateral -  Medial plica   - / -   Patellar tendon +   Popliteal fossa   - / -   Quad tendon   -   Gastrocnemius   -   Prepatellar Bursa - / -   Quadricep   -   Tibial tubercle  -  Thigh/hamstring  -   Pes anserine/HS -  Fibula    -   ITB   - / -  Tibia     -   Tib/fib joint  - / -  LCL    -     MFC   - / -   MCL: Proximal  -    LFC   - / -    Distal   -          ROM: (* = pain)  PASSIVE   ACTIVE    Left :   0 / 0 / 145   0 / 0 / 140     Right :    0 / 0 / 90   0 / 0 / 90    Patellofemoral examination:  See above noted areas of tenderness.   Patella position    Subluxation / dislocation: Centered           Sup. / Inf;   Normal   Crepitus (PF):    NT   Patellar Mobility:       Medial-lateral:   Normal    Superior-inferior:  Normal    Inferior tilt   Normal    Patellar tendon:  Normal   Lateral tilt:    Normal   J-sign:     NT   Patellofemoral grind:   NT      MENISCAL SIGNS:     Pain on terminal " extension:  -  Pain on terminal flexion:  NT  Kirills maneuver:  NT  Squat     NT    LIGAMENT EXAMINATION:  ACL / Lachman:  normal (-1 to 2mm)    PCL-Post.  drawer: normal 0 to 2mm  MCL- Valgus:  normal 0 to 2mm  LCL- Varus:  normal 0 to 2mm  Pivot shift: normal (Equal)   Dial Test: difference c/w other side   At 30° flexion: normal (< 5°)    At 90° flexion: normal (< 5°)   Reverse Pivot Shift:   normal (Equal)     STRENGTH: (* = with pain) PAINFUL SIDE   Quadricep   NT   Hamstring:   NT    EXTREMITY NEURO-VASCULAR EXAMINATION:   Sensation:  Grossly intact to light touch all dermatomal regions.   Motor Function:  Fully intact motor function at hip, knee, foot and ankle    DTRs;  quadriceps and  achilles 2+.  No clonus and downgoing Babinski.    Vascular status:  DP and PT pulses 2+, brisk capillary refill, symmetric.     OTHER FINDINGS:  + able to perform a straight leg raise against gravity    XRAY (12/18/18): Fracture through the patella is again seen in satisfactory position and alignment, with near complete radiographic healing.  No joint effusion is noted.  Mild bony demineralization is seen.     ASSESSMENT:    Right Nondisplaced Patellar Fracture, healing    PLAN:   1. Continue PT at [facility] working on quad strengthening and gait  2. Follow up 2 months with new xrays (AP/lateral views only)

## 2019-01-09 ENCOUNTER — CLINICAL SUPPORT (OUTPATIENT)
Dept: REHABILITATION | Facility: HOSPITAL | Age: 54
End: 2019-01-09
Attending: ORTHOPAEDIC SURGERY
Payer: COMMERCIAL

## 2019-01-09 DIAGNOSIS — M62.81 MUSCLE WEAKNESS: ICD-10-CM

## 2019-01-09 DIAGNOSIS — M25.461 EFFUSION OF RIGHT KNEE: ICD-10-CM

## 2019-01-09 PROCEDURE — 97110 THERAPEUTIC EXERCISES: CPT

## 2019-01-09 NOTE — PROGRESS NOTES
Physical Therapy Daily Treatment & POC Update Note     Name: Grace Beltran  Clinic Number: [id number]    Therapy Diagnosis:   Encounter Diagnoses   Name Primary?    Muscle weakness     Effusion of right knee      Physician: Jay Quintana MD    Visit Date: 1/9/2019    Physician Orders: PT Eval and Treat Right Knee  Medical Diagnosis from Referral: M25.561,G89.29 (ICD-10-CM) - Chronic pain of right knee  Evaluation Date: 11/1/2018  Authorization Period Expiration: 12-    Plan of Care Expiration: 12-  Visit # / Visits authorized: 6/25    Time In: 9:02 AM  Time Out: 10:04 AM  Total Billable Time: 45 minutes    Precautions: Standard and See Precautions Below  - First 2 weeks 0 - 45 AROM/PROM allowed and quad/hip strengthening  - Second 2 weeks 0 - 90 AROM/PROM allowed and continue strengthening  - WBAT in Brace  - As of 11- according to patient per MD she is allowed to perform knee flexion to tolerance and is to maintain WBAT in the brace    Subjective     Pt reports: that she has been having some pain in her left hip down to the inner part of her L knee (non-surgical side). She says she thinks it's because of the way she's walking.    She was compliant with home exercise program.    Response to previous treatment: No adverse impact  Functional change: Improved knee extension ability     Objective     Grace received therapeutic exercises to develop strength, endurance, ROM and flexibility for 45 minutes including:  - R Supine SLR's w/ Heel Propped - 3x10 w/ 2 lbs. CW  - Supine Bridges - 60x  - B Side-Lying Clamshells - 3x10 w/ Orange TB  - Goblet Sit<>Stands - 3x8 w/ 15 lbs.  - Fwd Steps-Ups onto Yellow Box - 3x8 w/ 10 lbs. DB  - Fwd Step-Downs on 3-Inch Box - 3x10  - Shuttle Double-Leg Presses - 3x12 w/ 2 Black  - R TRX Assisted Reverse Lunges - 3x12  BELOW NOT PERFORMED TODAY  - R Heel Sets - 30x5 sec. Holds  - R Standing TKE's - 30x2 sec. Holds w/ Maroon TB  - Standing Bilateral  Heel Raises - 4x10  - Fwd Step-Downs to Tolerance - 3x10  - Pilates Box Assisted Step-Ups - 3x10 w/ 4 Coils & BUE Assist  - R SLS w/ Reaching Cone Taps - 1xFatigue     Grace received the following manual therapy techniques: Joint mobilizations and PROM Stretching were applied to the: R Knee for 0 minutes, including:  BELOW NOT PERFORMED TODAY    Grace received cold pack to R Knee for 10 minutes.    Home Exercises Provided and Patient Education Provided     Written Home Exercises Provided: yes.  Exercises were reviewed and Grace was able to demonstrate them prior to the end of the session.  Grace demonstrated good  understanding of the education provided.     See EMR under Media for exercises provided 11/7/2018.    MEASUREMENTS  - R Mid-Patella = 36.5 cm    AROM  - R Knee Extension = 10 degrees hyperextension  - R Knee Flexion = 135 degrees    STRENGTH  - R Hip Flexors = 4-/5  - R Quadriceps = 4-/5  - R Hamstrings = 4/5  - R Ankle DF = 5/5  - R Hip ABD = 4-/5  - R Hip ADD = 4/5  - R Ankle PF (Single-Leg Heel Raises) = 8 = 4-/5    FUNCTIONAL STRENGTH  - R SLR = patient is able to do 10 SLR's with slight knee extension lag in the last 2 reps with a 2 lbs. CW    GAIT  - Lack of knee extension control from initial contact to mid-stance secondary to poor quadriceps strength and control. This same lack of control can be seen during step-downs as she is unable to eccentrically control descent.    Outpatient Therapy Updated Plan of Care     Visit Date: 1/9/2019  Name: Grace Beltran  Clinic Number: [id number]    Therapy Diagnosis:   Encounter Diagnoses   Name Primary?    Muscle weakness     Effusion of right knee      Physician: Jay Quintana MD    Physician Orders: PT Eval and Treat Right Knee  Medical Diagnosis from Referral: M25.561,G89.29 (ICD-10-CM) - Chronic pain of right knee  Evaluation Date: 11/1/2018    Total Visits Received: 6  Cancelled Visits: 4  No Show Visits: 0    Current Certification  Period:  11- to 12-  Precautions:  None  Visits from Evaluation Date:  5  Functional Level Prior to Evaluation:  Independent with all activities    Assessment     Previous Short Term & Long Goals Status:     Goals:   Short Term Goals: 4 weeks   1. Patient will demonstrate improved AROM/PROM of R knee to 90 degrees flexion and 10 degrees hyperextension. (MET)  2. Patient will demonstrate improved quadriceps strength by being able to perform 5 SLR's with no signs of a knee extension lag on the RLE. (MET)  3. Patient will demonstrate improved R Knee Edema/Effusion to 36 cm or less at the mid-patella. (Progressing, NOT MET)  4. Patient will demonstrate improved functional ability by showing 50% or less limitation according to FOTO. (Progressing, NOT MET)     Long Term Goals: 8 weeks   1. Patient will demonstrate improved AROM of R Knee to 145 degrees knee flexion and 10 degrees hyperextension. (Progressing, NOT MET)  2. Patient will demonstrate improved quadriceps strength by being able to perform 10 SLR's with a 3 lbs. Cuff weight or more and no signs of a knee extension lag on the RLE. (Progressing, NOT MET)  3. Patient will demonstrate improved R Knee Edema/Effusion to 35.5 cm or less at the mid-patella. (Progressing, NOT MET)  4. Patient will demonstrate improved functional ability by showing 30% or less limitation according to FOTO. (Progressing, NOT MET)    Updated Goals:  New Short Term Goals Status:   4 Weeks  1. Patient will demonstrate improved AROM/PROM of R knee to 140 degrees flexion.  2. Patient will demonstrate improved quadriceps strength by being able to perform 10 SLR's with 3 lbs. CW and with no signs of a knee extension lag on the RLE.  3. Patient will demonstrate improved R Knee Edema/Effusion to 36 cm or less at the mid-patella.  4. Patient will demonstrate improved functional ability by showing 30% or less limitation according to FOTO.    New Long Term Goal Status:   8 Weeks  1.  Patient will demonstrate improved AROM of R Knee to 145 degrees knee flexion.  2. Patient will demonstrate improved quadriceps strength by being able to perform 10 SLR's with a 5 lbs. Cuff weight or more and no signs of a knee extension lag on the RLE.  3. Patient will demonstrate improved R Knee Edema/Effusion to 35.5 cm or less at the mid-patella.  4. Patient will demonstrate improved functional ability by showing 15% or less limitation according to FOTO.    Reasons for Recertification of Therapy:   The patient still demonstrates significant difficulty with ambulating up and down stairs and also demonstrates decreased quadriceps control while walking. She will benefit from continued skilled PT in order to correct these impairments and return her to her PLOF.    Plan     Updated Certification Period: 1/9/2019 to 03-  Recommended Treatment Plan: 2 times per week for 8 weeks: Gait Training, Manual Therapy, Moist Heat/ Ice, Neuromuscular Re-ed, Patient Education, Therapeutic Activites and Therapeutic Exercise  Other Recommendations: None    Noel Irwin, PT, DPT  1/9/2019      I CERTIFY THE NEED FOR THESE SERVICES FURNISHED UNDER THIS PLAN OF TREATMENT AND WHILE UNDER MY CARE    Physician's comments:        Physician's Signature: ___________________________________________________

## 2019-01-11 ENCOUNTER — CLINICAL SUPPORT (OUTPATIENT)
Dept: REHABILITATION | Facility: HOSPITAL | Age: 54
End: 2019-01-11
Attending: ORTHOPAEDIC SURGERY
Payer: COMMERCIAL

## 2019-01-11 DIAGNOSIS — M25.461 EFFUSION OF RIGHT KNEE: ICD-10-CM

## 2019-01-11 DIAGNOSIS — M62.81 MUSCLE WEAKNESS: ICD-10-CM

## 2019-01-11 PROCEDURE — 97110 THERAPEUTIC EXERCISES: CPT

## 2019-01-11 NOTE — PROGRESS NOTES
Physical Therapy Daily Treatment & POC Update Note     Name: Grace Beltran  Clinic Number: [id number]    Therapy Diagnosis:   Encounter Diagnoses   Name Primary?    Muscle weakness     Effusion of right knee      Physician: Jay Quintana MD    Visit Date: 1/11/2019    Physician Orders: PT Eval and Treat Right Knee  Medical Diagnosis from Referral: M25.561,G89.29 (ICD-10-CM) - Chronic pain of right knee  Evaluation Date: 11/1/2018  Authorization Period Expiration: 12-    Plan of Care Expiration: 03-  Visit # / Visits authorized: 7/25    Time In: 9:02 AM  Time Out: 10:07 AM  Total Billable Time: 54 minutes    Precautions: Standard and See Precautions Below  - First 2 weeks 0 - 45 AROM/PROM allowed and quad/hip strengthening  - Second 2 weeks 0 - 90 AROM/PROM allowed and continue strengthening  - WBAT in Brace  - As of 11- according to patient per MD she is allowed to perform knee flexion to tolerance and is to maintain WBAT in the brace    Subjective     Pt reports: that she was sore after her last treatment session, especially in her quadriceps.    She was compliant with home exercise program.    Response to previous treatment: Quadriceps Soreness  Functional change: Improved closed chain knee extension    Objective     Grace received therapeutic exercises to develop strength, endurance, ROM and flexibility for 54 minutes including:  - Stationary Bike - 8 min. [place] Lv. 1  - R Supine SLR's w/ Heel Propped - 3x10 w/ 3 lbs. CW  - Supine Bridges - 3x15  - B Side-Lying Clamshells - 3x10 w/ Blue TB  - R Hammer Leg Extension - 3x10 w/ 2.5 lbs.  - Goblet Sit<>Stands - 3x8 w/ 15 lbs. KB  - R Standing TKE's w/ Yellow Pull-Up Assist Band - 3x12  - R Pilates Box Step-Ups/Downs 3x6 w/ 4 Coils &  BUE Assist  - Supine [place]all Bridge w/ HS Curls - 3x6  - Shuttle Double-leg Presses - 3x12 w/ 2 Black  BELOW NOT PERFORMED TODAY  - R Heel Sets - 30x5 sec. Holds  - R Standing TKE's - 30x2 sec.  Holds w/ Maroon TB  - Standing Bilateral Heel Raises - 4x10  - Fwd Step-Downs to Tolerance - 3x10  - Pilates Box Assisted Step-Ups - 3x10 w/ 4 Coils & BUE Assist  - R SLS w/ Reaching Cone Taps - 1xFatigue   - Fwd Steps-Ups onto Yellow Box - 3x8 w/ 10 lbs. DB  - Fwd Step-Downs on 3-Inch Box - 3x10  - R TRX Assisted Reverse Lunges - 3x12    Grace received the following manual therapy techniques: Joint mobilizations and PROM Stretching were applied to the: R Knee for 0 minutes, including:  BELOW NOT PERFORMED TODAY    Grace received cold pack to R Knee for 10 minutes.    Home Exercises Provided and Patient Education Provided     Written Home Exercises Provided: yes.  Exercises were reviewed and Grace was able to demonstrate them prior to the end of the session.  Grace demonstrated good  understanding of the education provided.     See EMR under Media for exercises provided 11/7/2018.    Assessment      - The patient had no pain with any of her activities today. Her largest impairment is decreased strength of the quadriceps musculature. She had difficulty achieving.     Grace is progressing well towards her goals.   Pt prognosis is Excellent.      Pt will continue to benefit from skilled outpatient physical therapy to address the deficits listed in the problem list box on initial evaluation, provide pt/family education and to maximize pt's level of independence in the home and community environment.      Pt's spiritual, cultural and educational needs considered and pt agreeable to plan of care and goals.     Anticipated barriers to physical therapy: None     Short Term Goals:   4 Weeks  1. Patient will demonstrate improved AROM/PROM of R knee to 140 degrees flexion.  2. Patient will demonstrate improved quadriceps strength by being able to perform 10 SLR's with 3 lbs. CW and with no signs of a knee extension lag on the RLE.  3. Patient will demonstrate improved R Knee Edema/Effusion to 36 cm or less at the  "mid-patella.  4. Patient will demonstrate improved functional ability by showing 30% or less limitation according to FOTO.     Long Term Goal:   8 Weeks  1. Patient will demonstrate improved AROM of R Knee to 145 degrees knee flexion.  2. Patient will demonstrate improved quadriceps strength by being able to perform 10 SLR's with a 5 lbs. Cuff weight or more and no signs of a knee extension lag on the RLE.  3. Patient will demonstrate improved R Knee Edema/Effusion to 35.5 cm or less at the mid-patella.  4. Patient will demonstrate improved functional ability by showing 15% or less limitation according to FOTO.     Plan      Continue to progress patient per MD Notes in "Precautions."      Noel Irwin, PT, DPT  "

## 2019-01-15 ENCOUNTER — CLINICAL SUPPORT (OUTPATIENT)
Dept: REHABILITATION | Facility: HOSPITAL | Age: 54
End: 2019-01-15
Attending: ORTHOPAEDIC SURGERY
Payer: COMMERCIAL

## 2019-01-15 DIAGNOSIS — M25.461 EFFUSION OF RIGHT KNEE: ICD-10-CM

## 2019-01-15 DIAGNOSIS — M62.81 MUSCLE WEAKNESS: ICD-10-CM

## 2019-01-15 PROCEDURE — 97110 THERAPEUTIC EXERCISES: CPT

## 2019-01-15 NOTE — PROGRESS NOTES
Physical Therapy Daily Treatment & POC Update Note     Name: Grace Beltran  Clinic Number: [id number]    Therapy Diagnosis:   Encounter Diagnoses   Name Primary?    Muscle weakness     Effusion of right knee      Physician: Jay Quintana MD    Visit Date: 1/15/2019    Physician Orders: PT Eval and Treat Right Knee  Medical Diagnosis from Referral: M25.561,G89.29 (ICD-10-CM) - Chronic pain of right knee  Evaluation Date: 11/1/2018  Authorization Period Expiration: 12-    Plan of Care Expiration: 03-  Visit # / Visits authorized: 8/25    Time In: 0904  Time Out: 1000  Total Billable Time: 25 minutes    Precautions: Standard and See Precautions Below  - First 2 weeks 0 - 45 AROM/PROM allowed and quad/hip strengthening  - Second 2 weeks 0 - 90 AROM/PROM allowed and continue strengthening  - WBAT in Brace  - As of 11- according to patient per MD she is allowed to perform knee flexion to tolerance and is to maintain WBAT in the brace    Subjective     Pt states feeling well w/ no c/o pn in R knee    She was compliant with home exercise program.    Response to previous treatment: Quadriceps Soreness  Functional change: Improved closed chain knee extension    Objective     Grace received therapeutic exercises to develop strength, endurance, ROM and flexibility for 25 minutes including:  - Stationary Bike - 5 min. To increase blood flow   - Supine Bridges - 30 x 3 sec hold   - B Side-Lying Clamshells - 3x10 w/ Blue TB  - R Hammer Leg Extension - 3x10 w/ 2.5 lbs.  - Goblet Sit<>Stands - 3x8 w/ 15 lbs. KB  - R Pilates Box Step-Ups/Downs 3x10 w/ 4 Coils &  BUE Assist  - Supine Theraball Bridge w/ HS Curls - 3x6  - Leg press single leg 3 x 10     BELOW NOT PERFORMED TODAY  - R Supine SLR's w/ Heel Propped - 3x10 w/ 3 lbs. CW  - R Standing TKE's w/ Yellow Pull-Up Assist Band - 3x12  - Shuttle Double-leg Presses - 3x12 w/ 2 Black  - R Heel Sets - 30x5 sec. Holds  - R Standing TKE's - 30x2 sec.  Holds w/ Maroon TB  - Standing Bilateral Heel Raises - 4x10  - Fwd Step-Downs to Tolerance - 3x10  - Pilates Box Assisted Step-Ups - 3x10 w/ 4 Coils & BUE Assist  - R SLS w/ Reaching Cone Taps - 1xFatigue   - Fwd Steps-Ups onto Yellow Box - 3x8 w/ 10 lbs. DB  - Fwd Step-Downs on 3-Inch Box - 3x10  - R TRX Assisted Reverse Lunges - 3x12    Grace received the following manual therapy techniques: Joint mobilizations and PROM Stretching were applied to the: R Knee for 0 minutes, including:  BELOW NOT PERFORMED TODAY    Grace received cold pack to R Knee for 10 minutes.    Home Exercises Provided and Patient Education Provided     Written Home Exercises Provided: yes.  Exercises were reviewed and Grace was able to demonstrate them prior to the end of the session.  Grace demonstrated good  understanding of the education provided.     See EMR under Media for exercises provided 11/7/2018.    Assessment    Pt sheldon tx well w/ no c/o pn.  Pt showed increased quad strength during therex.  Pt cont to lack some strength and core stability.        Grace is progressing well towards her goals.   Pt prognosis is Excellent.      Pt will continue to benefit from skilled outpatient physical therapy to address the deficits listed in the problem list box on initial evaluation, provide pt/family education and to maximize pt's level of independence in the home and community environment.      Pt's spiritual, cultural and educational needs considered and pt agreeable to plan of care and goals.     Anticipated barriers to physical therapy: None     Short Term Goals:   4 Weeks  1. Patient will demonstrate improved AROM/PROM of R knee to 140 degrees flexion.  2. Patient will demonstrate improved quadriceps strength by being able to perform 10 SLR's with 3 lbs. CW and with no signs of a knee extension lag on the RLE.  3. Patient will demonstrate improved R Knee Edema/Effusion to 36 cm or less at the mid-patella.  4. Patient will  demonstrate improved functional ability by showing 30% or less limitation according to FOTO.     Long Term Goal:   8 Weeks  1. Patient will demonstrate improved AROM of R Knee to 145 degrees knee flexion.  2. Patient will demonstrate improved quadriceps strength by being able to perform 10 SLR's with a 5 lbs. Cuff weight or more and no signs of a knee extension lag on the RLE.  3. Patient will demonstrate improved R Knee Edema/Effusion to 35.5 cm or less at the mid-patella.  4. Patient will demonstrate improved functional ability by showing 15% or less limitation according to FOTO.     Plan      Cont to progress towards goals set by PT.  Work to increase quad and hip strength next visit.       Niles Murillo, PTA

## 2019-01-18 ENCOUNTER — CLINICAL SUPPORT (OUTPATIENT)
Dept: REHABILITATION | Facility: HOSPITAL | Age: 54
End: 2019-01-18
Attending: ORTHOPAEDIC SURGERY
Payer: COMMERCIAL

## 2019-01-18 DIAGNOSIS — M25.461 EFFUSION OF RIGHT KNEE: ICD-10-CM

## 2019-01-18 DIAGNOSIS — M62.81 MUSCLE WEAKNESS: ICD-10-CM

## 2019-01-18 PROCEDURE — 97110 THERAPEUTIC EXERCISES: CPT

## 2019-01-18 NOTE — PROGRESS NOTES
Physical Therapy Daily Treatment & POC Update Note     Name: Grace Beltran  Clinic Number: [id number]    Therapy Diagnosis:   Encounter Diagnoses   Name Primary?    Muscle weakness     Effusion of right knee      Physician: Jay Quintana MD    Visit Date: 1/18/2019    Physician Orders: PT Eval and Treat Right Knee  Medical Diagnosis from Referral: M25.561,G89.29 (ICD-10-CM) - Chronic pain of right knee  Evaluation Date: 11/1/2018  Authorization Period Expiration: 12-    Plan of Care Expiration: 03-  Visit # / Visits authorized: 9/25    Time In: 1400  Time Out: 1500  Total Billable Time: 25 minutes    Precautions: Standard and See Precautions Below  - First 2 weeks 0 - 45 AROM/PROM allowed and quad/hip strengthening  - Second 2 weeks 0 - 90 AROM/PROM allowed and continue strengthening  - WBAT in Brace  - As of 11- according to patient per MD she is allowed to perform knee flexion to tolerance and is to maintain WBAT in the brace    Subjective     Pt reports w/ mild discomfort in R knee.      She was compliant with home exercise program.    Response to previous treatment: Quadriceps Soreness  Functional change: Improved closed chain knee extension    Objective     Grace received therapeutic exercises to develop strength, endurance, ROM and flexibility for 25 minutes including:  - Stationary Bike - 5 min. To increase blood flow   - Supine Bridges - 30 x 3 sec hold   - B Side-Lying Clamshells - 2 x15 w/ Blue TB  - R Hammer Leg Extension - 3x10 w/ 2.5 lbs.  - Goblet Sit<>Stands - 3x8 w/ 15 lbs. KB  - R Pilates Box Step-Ups/Downs 3x10 w/ 4 Coils &  BUE Assist  - Supine Theraball Bridge w/ HS Curls - 3x10  - Leg press single leg 3 x 10 2 band     BELOW NOT PERFORMED TODAY  - R Supine SLR's w/ Heel Propped - 3x10 w/ 3 lbs. CW  - R Standing TKE's w/ Yellow Pull-Up Assist Band - 3x12  - Shuttle Double-leg Presses - 3x12 w/ 2 Black  - R Heel Sets - 30x5 sec. Holds  - R Standing TKE's -  30x2 sec. Holds w/ Maroon TB  - Standing Bilateral Heel Raises - 4x10  - Fwd Step-Downs to Tolerance - 3x10  - Pilates Box Assisted Step-Ups - 3x10 w/ 4 Coils & BUE Assist  - R SLS w/ Reaching Cone Taps - 1xFatigue   - Fwd Steps-Ups onto Yellow Box - 3x8 w/ 10 lbs. DB  - Fwd Step-Downs on 3-Inch Box - 3x10  - R TRX Assisted Reverse Lunges - 3x12    Grace received the following manual therapy techniques: Joint mobilizations and PROM Stretching were applied to the: R Knee for 0 minutes, including:  BELOW NOT PERFORMED TODAY    Grace received cold pack to R Knee for 10 minutes.    Home Exercises Provided and Patient Education Provided     Written Home Exercises Provided: yes.  Exercises were reviewed and Grace was able to demonstrate them prior to the end of the session.  Grace demonstrated good  understanding of the education provided.     See EMR under Media for exercises provided 11/7/2018.    Assessment     Pt displayed increased strength and endurance during therex.  Pt cont ot lack core stability and LE strength.  Pt had no adverse effects from tx.    Grace is progressing well towards her goals.   Pt prognosis is Excellent.      Pt will continue to benefit from skilled outpatient physical therapy to address the deficits listed in the problem list box on initial evaluation, provide pt/family education and to maximize pt's level of independence in the home and community environment.      Pt's spiritual, cultural and educational needs considered and pt agreeable to plan of care and goals.     Anticipated barriers to physical therapy: None     Short Term Goals:   4 Weeks  1. Patient will demonstrate improved AROM/PROM of R knee to 140 degrees flexion.  2. Patient will demonstrate improved quadriceps strength by being able to perform 10 SLR's with 3 lbs. CW and with no signs of a knee extension lag on the RLE.  3. Patient will demonstrate improved R Knee Edema/Effusion to 36 cm or less at the  mid-patella.  4. Patient will demonstrate improved functional ability by showing 30% or less limitation according to FOTO.     Long Term Goal:   8 Weeks  1. Patient will demonstrate improved AROM of R Knee to 145 degrees knee flexion.  2. Patient will demonstrate improved quadriceps strength by being able to perform 10 SLR's with a 5 lbs. Cuff weight or more and no signs of a knee extension lag on the RLE.  3. Patient will demonstrate improved R Knee Edema/Effusion to 35.5 cm or less at the mid-patella.  4. Patient will demonstrate improved functional ability by showing 15% or less limitation according to FOTO.     Plan      Cont to progress towards goals set by PT.  Work to increase quad and hip strength next visit.       Niles Murillo, PTA

## 2019-01-22 ENCOUNTER — CLINICAL SUPPORT (OUTPATIENT)
Dept: REHABILITATION | Facility: HOSPITAL | Age: 54
End: 2019-01-22
Attending: ORTHOPAEDIC SURGERY
Payer: COMMERCIAL

## 2019-01-22 DIAGNOSIS — M62.81 MUSCLE WEAKNESS: ICD-10-CM

## 2019-01-22 DIAGNOSIS — M25.461 EFFUSION OF RIGHT KNEE: ICD-10-CM

## 2019-01-22 PROCEDURE — 97110 THERAPEUTIC EXERCISES: CPT

## 2019-01-22 NOTE — PROGRESS NOTES
Physical Therapy Daily Treatment & POC Update Note     Name: Grace Beltran  Clinic Number: [id number]    Therapy Diagnosis:   Encounter Diagnoses   Name Primary?    Muscle weakness     Effusion of right knee      Physician: Jay Quintana MD    Visit Date: 1/22/2019    Physician Orders: PT Eval and Treat Right Knee  Medical Diagnosis from Referral: M25.561,G89.29 (ICD-10-CM) - Chronic pain of right knee  Evaluation Date: 11/1/2018  Authorization Period Expiration: 12-    Plan of Care Expiration: 03-  Visit # / Visits authorized: 10/25    Time In: 0900  Time Out: 1005   Total Billable Time: 25 minutes    Precautions: Standard and See Precautions Below  - First 2 weeks 0 - 45 AROM/PROM allowed and quad/hip strengthening  - Second 2 weeks 0 - 90 AROM/PROM allowed and continue strengthening  - WBAT in Brace  - As of 11- according to patient per MD she is allowed to perform knee flexion to tolerance and is to maintain WBAT in the brace    Subjective     Pt states having B medial knee pn 2* walking around all weekend.  Pt mentioned not performing HEP 2* having house guest this weekend.  Pt feels as if HEP is not intense enough.      She was compliant with home exercise program.    Response to previous treatment: Quadriceps Soreness  Functional change: Improved closed chain knee extension    Objective     Grace received therapeutic exercises to develop strength, endurance, ROM and flexibility for 25 minutes including:  - Stationary Bike - 5 min. To increase blood flow   - HSS 1 min plinthe  - GSS 1 min strap   - Supine Bridges - 30 x 3 sec hold   -  Mini squats 3 x 10   - B HR 3 x 10   - B Side-Lying Clamshells - 2 x15 w/ Blue TB  - R Hammer Leg Extension - 2 x 15 w/ 2.5 lbs.  - Goblet Sit<>Stands - 3x8 w/ 15 lbs. KB  - R Pilates Box Step-Ups/Downs 20 x , 10 x  w/ 4 Coils &  BUE Assist  - Supine Theraball Bridge w/ HS Curls - 3x10  - Leg press single leg 3 x 10 2 band     BELOW NOT  PERFORMED TODAY  - R Supine SLR's w/ Heel Propped - 3x10 w/ 3 lbs. CW  - R Standing TKE's w/ Yellow Pull-Up Assist Band - 3x12  - Shuttle Double-leg Presses - 3x12 w/ 2 Black  - R Heel Sets - 30x5 sec. Holds  - R Standing TKE's - 30x2 sec. Holds w/ Maroon TB  - Standing Bilateral Heel Raises - 4x10  - Fwd Step-Downs to Tolerance - 3x10  - Pilates Box Assisted Step-Ups - 3x10 w/ 4 Coils & BUE Assist  - R SLS w/ Reaching Cone Taps - 1xFatigue   - Fwd Steps-Ups onto Yellow Box - 3x8 w/ 10 lbs. DB  - Fwd Step-Downs on 3-Inch Box - 3x10  - R TRX Assisted Reverse Lunges - 3x12    Grace received the following manual therapy techniques: Joint mobilizations and PROM Stretching were applied to the: R Knee for 0 minutes, including:  BELOW NOT PERFORMED TODAY    Grace received cold pack to R Knee for 10 minutes.    Home Exercises Provided and Patient Education Provided     Written Home Exercises Provided: yes. PTA added mini squats, LAQ, B HR to HEP.    Exercises were reviewed and Grace was able to demonstrate them prior to the end of the session.  Grace demonstrated good  understanding of the education provided.     See EMR under Media for exercises provided 11/7/2018.    Assessment   Pt sheldon tx well.  Pn level decreased after tx session.  Pt showed increased quad strength but cont to lack some.  Pt has core weakness as well.      Grace is progressing well towards her goals.   Pt prognosis is Excellent.      Pt will continue to benefit from skilled outpatient physical therapy to address the deficits listed in the problem list box on initial evaluation, provide pt/family education and to maximize pt's level of independence in the home and community environment.      Pt's spiritual, cultural and educational needs considered and pt agreeable to plan of care and goals.     Anticipated barriers to physical therapy: None     Short Term Goals:   4 Weeks  1. Patient will demonstrate improved AROM/PROM of R knee to 140 degrees  flexion.  2. Patient will demonstrate improved quadriceps strength by being able to perform 10 SLR's with 3 lbs. CW and with no signs of a knee extension lag on the RLE.  3. Patient will demonstrate improved R Knee Edema/Effusion to 36 cm or less at the mid-patella.  4. Patient will demonstrate improved functional ability by showing 30% or less limitation according to FOTO.     Long Term Goal:   8 Weeks  1. Patient will demonstrate improved AROM of R Knee to 145 degrees knee flexion.  2. Patient will demonstrate improved quadriceps strength by being able to perform 10 SLR's with a 5 lbs. Cuff weight or more and no signs of a knee extension lag on the RLE.  3. Patient will demonstrate improved R Knee Edema/Effusion to 35.5 cm or less at the mid-patella.  4. Patient will demonstrate improved functional ability by showing 15% or less limitation according to FOTO.     Plan      Cont to progress towards goals set by PT.  Work to increase quad and hip strength next visit.       Niles Murillo, PTA

## 2019-01-25 ENCOUNTER — CLINICAL SUPPORT (OUTPATIENT)
Dept: REHABILITATION | Facility: HOSPITAL | Age: 54
End: 2019-01-25
Attending: ORTHOPAEDIC SURGERY
Payer: COMMERCIAL

## 2019-01-25 DIAGNOSIS — M25.461 EFFUSION OF RIGHT KNEE: ICD-10-CM

## 2019-01-25 DIAGNOSIS — M62.81 MUSCLE WEAKNESS: ICD-10-CM

## 2019-01-25 PROCEDURE — 97110 THERAPEUTIC EXERCISES: CPT

## 2019-01-25 PROCEDURE — 97140 MANUAL THERAPY 1/> REGIONS: CPT

## 2019-01-25 NOTE — PROGRESS NOTES
Physical Therapy Daily Treatment & POC Update Note     Name: Grace Beltran  Clinic Number: [id number]    Therapy Diagnosis:   No diagnosis found.  Physician: Jay Quintana MD    Visit Date: 1/25/2019    Physician Orders: PT Eval and Treat Right Knee  Medical Diagnosis from Referral: M25.561,G89.29 (ICD-10-CM) - Chronic pain of right knee  Evaluation Date: 11/1/2018  Authorization Period Expiration: 12-    Plan of Care Expiration: 03-  Visit # / Visits authorized: 12/25    Time In: 0900  Time Out: 1005  Total Billable Time: 55 minutes    Precautions: Standard and See Precautions Below  - First 2 weeks 0 - 45 AROM/PROM allowed and quad/hip strengthening  - Second 2 weeks 0 - 90 AROM/PROM allowed and continue strengthening  - WBAT in Brace  - As of 11- according to patient per MD she is allowed to perform knee flexion to tolerance and is to maintain WBAT in the brace    Subjective     Pt reports w/ medial knee pn and popping 2* kneeling on the bed and feeling an increase in pn Wednesday.  Pn only occurs when flexing the knee but not when walking.    She was compliant with home exercise program.    Response to previous treatment: Quadriceps Soreness  Functional change: Improved closed chain knee extension    Objective     Grace received therapeutic exercises to develop strength, endurance, ROM and flexibility for 45 minutes including:    - Stationary Bike - 5 min. To increase blood flow   - HSS 1.5 min plinthe  - GSS 1.5 min strap   - Supine Bridges - 30 x 3 sec hold   - Leg press single leg 3 x 10 2 band   -  Mini squats 3 x 10 (stopped 2* increased pn)   - B HR 30 x  - R Hammer Leg Extension - 2 x 15 w/ 2.5 lbs.  - B Side-Lying Clamshells - 2 x15 w/ Blue TB  - SLR 30 x   - Prone hip ext 30 x     BELOW NOT PERFORMED TODAY  - R Supine SLR's w/ Heel Propped - 3x10 w/ 3 lbs. CW  - R Standing TKE's w/ Yellow Pull-Up Assist Band - 3x12  - Shuttle Double-leg Presses - 3x12 w/ 2 Black  - R  Heel Sets - 30x5 sec. Holds  - R Standing TKE's - 30x2 sec. Holds w/ Maroon TB  - Standing Bilateral Heel Raises - 4x10  - Fwd Step-Downs to Tolerance - 3x10  - Pilates Box Assisted Step-Ups - 3x10 w/ 4 Coils & BUE Assist  - R SLS w/ Reaching Cone Taps - 1xFatigue   - Fwd Steps-Ups onto Yellow Box - 3x8 w/ 10 lbs. DB  - Fwd Step-Downs on 3-Inch Box - 3x10  - R TRX Assisted Reverse Lunges - 3x12  - Goblet Sit<>Stands - 3x8 w/ 15 lbs. KB  - R Pilates Box Step-Ups/Downs 20 x , 10 x  w/ 4 Coils &  BUE Assist    Grace received the following manual therapy techniques: STM applied to: R Knee and hamstringfor 10 minutes, including:  STM roller Hamstring 5 min   STM R knee 5 min    Grace received cold pack to R Knee for 10 minutes.    Home Exercises Provided and Patient Education Provided     Written Home Exercises Provided: yes. PTA added mini squats, LAQ, B HR to HEP.    Exercises were reviewed and Grace was able to demonstrate them prior to the end of the session.  Grace demonstrated good  understanding of the education provided.     See EMR under Media for exercises provided 11/7/2018.    Assessment     Therex adjusted 2* increased pn.  Pn level decreased after tx session.  Pt showed increased muscular endurance during therex.  Pt cont to have some strength deficits.    Grace is progressing well towards her goals.   Pt prognosis is Excellent.      Pt will continue to benefit from skilled outpatient physical therapy to address the deficits listed in the problem list box on initial evaluation, provide pt/family education and to maximize pt's level of independence in the home and community environment.      Pt's spiritual, cultural and educational needs considered and pt agreeable to plan of care and goals.     Anticipated barriers to physical therapy: None     Short Term Goals:   4 Weeks  1. Patient will demonstrate improved AROM/PROM of R knee to 140 degrees flexion.  2. Patient will demonstrate improved  quadriceps strength by being able to perform 10 SLR's with 3 lbs. CW and with no signs of a knee extension lag on the RLE.  3. Patient will demonstrate improved R Knee Edema/Effusion to 36 cm or less at the mid-patella.  4. Patient will demonstrate improved functional ability by showing 30% or less limitation according to FOTO.     Long Term Goal:   8 Weeks  1. Patient will demonstrate improved AROM of R Knee to 145 degrees knee flexion.  2. Patient will demonstrate improved quadriceps strength by being able to perform 10 SLR's with a 5 lbs. Cuff weight or more and no signs of a knee extension lag on the RLE.  3. Patient will demonstrate improved R Knee Edema/Effusion to 35.5 cm or less at the mid-patella.  4. Patient will demonstrate improved functional ability by showing 15% or less limitation according to FOTO.     Plan      Cont to progress towards goals set by PT.  Work to increase quad and hip strength next visit.       Niles Murillo, PTA

## 2019-01-29 ENCOUNTER — CLINICAL SUPPORT (OUTPATIENT)
Dept: REHABILITATION | Facility: HOSPITAL | Age: 54
End: 2019-01-29
Attending: ORTHOPAEDIC SURGERY
Payer: COMMERCIAL

## 2019-01-29 DIAGNOSIS — M25.461 EFFUSION OF RIGHT KNEE: ICD-10-CM

## 2019-01-29 DIAGNOSIS — M62.81 MUSCLE WEAKNESS: ICD-10-CM

## 2019-01-29 PROCEDURE — 97110 THERAPEUTIC EXERCISES: CPT

## 2019-01-29 NOTE — PROGRESS NOTES
Physical Therapy Daily Treatment & POC Update Note     Name: Grace Beltran  Clinic Number: [id number]    Therapy Diagnosis:   Encounter Diagnoses   Name Primary?    Muscle weakness     Effusion of right knee      Physician: Jay Quintana MD    Visit Date: 1/29/2019    Physician Orders: PT Eval and Treat Right Knee  Medical Diagnosis from Referral: M25.561,G89.29 (ICD-10-CM) - Chronic pain of right knee  Evaluation Date: 11/1/2018  Authorization Period Expiration: 12-    Plan of Care Expiration: 03-  Visit # / Visits authorized: 13/25    Time In: 0905  Time Out: 1000  Total Billable Time: 55 minutes    Precautions: Standard and See Precautions Below  - First 2 weeks 0 - 45 AROM/PROM allowed and quad/hip strengthening  - Second 2 weeks 0 - 90 AROM/PROM allowed and continue strengthening  - WBAT in Brace  - As of 11- according to patient per MD she is allowed to perform knee flexion to tolerance and is to maintain WBAT in the brace    Subjective     Pt reports she has been feeling well, she's only had pain when she walked 1 mile, rated 2/10.   She was compliant with home exercise program.    Response to previous treatment: Quadriceps Soreness  Functional change: Improved closed chain knee extension    Objective     Grace received therapeutic exercises to develop strength, endurance, ROM and flexibility for 45 minutes including:    - Stationary Bike - 8 min. To increase blood flow  - heel slide 20x10 sec  - quad stretch off table c strap 4x20 sec   - SLR 2# 30x    - SL clamshells 2# 30x (B)  - SL Bridge c LE crossed - 3x12  - Leg press single leg 3x12 no weight    - B HR 30 x  - R Hammer Leg Extension - 2 x 15 w/ 2.5 lbs.  - Prone hip ext 30 x     BELOW NOT PERFORMED TODAY  - R Supine SLR's w/ Heel Propped - 3x10 w/ 3 lbs. CW  - R Standing TKE's w/ Yellow Pull-Up Assist Band - 3x12  - Shuttle Double-leg Presses - 3x12 w/ 2 Black  - R Heel Sets - 30x5 sec. Holds  - R Standing TKE's -  30x2 sec. Holds w/ Maroon TB  - Standing Bilateral Heel Raises - 4x10  - Fwd Step-Downs to Tolerance - 3x10  - Pilates Box Assisted Step-Ups - 3x10 w/ 4 Coils & BUE Assist  - R SLS w/ Reaching Cone Taps - 1xFatigue   - Fwd Steps-Ups onto Yellow Box - 3x8 w/ 10 lbs. DB  - Fwd Step-Downs on 3-Inch Box - 3x10  - R TRX Assisted Reverse Lunges - 3x12  - Goblet Sit<>Stands - 3x8 w/ 15 lbs. KB  - R Pilates Box Step-Ups/Downs 20 x , 10 x  w/ 4 Coils &  BUE Assist    Grace received the following manual therapy techniques: STM applied to: R Knee and hamstringfor 0 minutes, including:      Grace received cold pack to R Knee for 10 minutes.    Home Exercises Provided and Patient Education Provided     Written Home Exercises Provided: yes. PTA added mini squats, LAQ, B HR to HEP.    Exercises were reviewed and Grace was able to demonstrate them prior to the end of the session.  Grace demonstrated good  understanding of the education provided.     See EMR under Media for exercises provided 11/7/2018.    Assessment     Initiated quad stretch c strap with excellent tolerance noted. Continued quad and hip strengthening. Pt unable tp tolerate SL shuttle squats with weight. No symptom aggravation during session. Discussed dropping down to once weekly after next week; pt agreed with this plan.   Grace is progressing well towards her goals.   Pt prognosis is Excellent.      Pt will continue to benefit from skilled outpatient physical therapy to address the deficits listed in the problem list box on initial evaluation, provide pt/family education and to maximize pt's level of independence in the home and community environment.      Pt's spiritual, cultural and educational needs considered and pt agreeable to plan of care and goals.     Anticipated barriers to physical therapy: None     Short Term Goals:   4 Weeks  1. Patient will demonstrate improved AROM/PROM of R knee to 140 degrees flexion.  2. Patient will demonstrate  improved quadriceps strength by being able to perform 10 SLR's with 3 lbs. CW and with no signs of a knee extension lag on the RLE.  3. Patient will demonstrate improved R Knee Edema/Effusion to 36 cm or less at the mid-patella.  4. Patient will demonstrate improved functional ability by showing 30% or less limitation according to FOTO.     Long Term Goal:   8 Weeks  1. Patient will demonstrate improved AROM of R Knee to 145 degrees knee flexion.  2. Patient will demonstrate improved quadriceps strength by being able to perform 10 SLR's with a 5 lbs. Cuff weight or more and no signs of a knee extension lag on the RLE.  3. Patient will demonstrate improved R Knee Edema/Effusion to 35.5 cm or less at the mid-patella.  4. Patient will demonstrate improved functional ability by showing 15% or less limitation according to FOTO.     Plan      Cont to progress towards goals.  Work to increase quad and hip strength next visit.  Drop down to once weekly after next week.     Laura Calvillo, PT, DPT

## 2019-02-01 ENCOUNTER — CLINICAL SUPPORT (OUTPATIENT)
Dept: REHABILITATION | Facility: HOSPITAL | Age: 54
End: 2019-02-01
Attending: ORTHOPAEDIC SURGERY
Payer: COMMERCIAL

## 2019-02-01 DIAGNOSIS — M62.81 MUSCLE WEAKNESS: ICD-10-CM

## 2019-02-01 DIAGNOSIS — M25.461 EFFUSION OF RIGHT KNEE: ICD-10-CM

## 2019-02-01 PROCEDURE — 97110 THERAPEUTIC EXERCISES: CPT

## 2019-02-01 NOTE — PROGRESS NOTES
"  Physical Therapy Daily Treatment & POC Update Note     Name: Grace Beltran  Clinic Number: [id number]    Therapy Diagnosis:   No diagnosis found.  Physician: Jay Quintana MD    Visit Date: 2/1/2019    Physician Orders: PT Eval and Treat Right Knee  Medical Diagnosis from Referral: M25.561,G89.29 (ICD-10-CM) - Chronic pain of right knee  Evaluation Date: 11/1/2018  Authorization Period Expiration: 12-    Plan of Care Expiration: 03-  Visit # / Visits authorized: 14/25    Time In: 1605  Time Out: 1705  Total Billable Time: 25 minutes    Precautions: Standard and See Precautions Below  - First 2 weeks 0 - 45 AROM/PROM allowed and quad/hip strengthening  - Second 2 weeks 0 - 90 AROM/PROM allowed and continue strengthening  - WBAT in Brace  - As of 11- according to patient per MD she is allowed to perform knee flexion to tolerance and is to maintain WBAT in the brace    Subjective     Pt states having slightly increased R knee pn today described as 2/10.   She was compliant with home exercise program.    Response to previous treatment: Quadriceps Soreness  Functional change: Improved closed chain knee extension    Objective     Grace received therapeutic exercises to develop strength, endurance, ROM and flexibility for 25 minutes including:    - Stationary Bike - 5 min. To increase blood flow  - heel slide 20x10 sec  - quad stretch off table c strap 4x20 sec   - SLR 2# 30x    - SL clamshells 2# 30x (B)  - SL Bridge c LE crossed - 3x15  - Leg press single leg 3x10 40#    - B HR 30 x 3 sec hold   Step Ups 4" 3 x 10   - Prone hip ext 30 x     BELOW NOT PERFORMED TODAY  - R Hammer Leg Extension - 2 x 15 w/ 2.5 lbs.  - R Supine SLR's w/ Heel Propped - 3x10 w/ 3 lbs. CW  - R Standing TKE's w/ Yellow Pull-Up Assist Band - 3x12  - Shuttle Double-leg Presses - 3x12 w/ 2 Black  - R Heel Sets - 30x5 sec. Holds  - R Standing TKE's - 30x2 sec. Holds w/ Maroon TB  - Standing Bilateral Heel Raises - " 4x10  - Fwd Step-Downs to Tolerance - 3x10  - Pilates Box Assisted Step-Ups - 3x10 w/ 4 Coils & BUE Assist  - R SLS w/ Reaching Cone Taps - 1xFatigue   - Fwd Steps-Ups onto Yellow Box - 3x8 w/ 10 lbs. DB  - Fwd Step-Downs on 3-Inch Box - 3x10  - R TRX Assisted Reverse Lunges - 3x12  - Goblet Sit<>Stands - 3x8 w/ 15 lbs. KB  - R Pilates Box Step-Ups/Downs 20 x , 10 x  w/ 4 Coils &  BUE Assist    Grace received the following manual therapy techniques: STM applied to: R Knee and hamstringfor 0 minutes, including:      Grace received cold pack to R Knee for 10 minutes.    Home Exercises Provided and Patient Education Provided     Written Home Exercises Provided: yes. PTA added mini squats, LAQ, B HR to HEP.    Exercises were reviewed and Grace was able to demonstrate them prior to the end of the session.  Grace demonstrated good  understanding of the education provided.     See EMR under Media for exercises provided 11/7/2018.    Assessment   Pt sheldon tx well.  Pn level decreased after tx session.  Pt demonstrated improved quad strength during therex.  Pt cont to have some LE weakness.      Grace is progressing well towards her goals.   Pt prognosis is Excellent.      Pt will continue to benefit from skilled outpatient physical therapy to address the deficits listed in the problem list box on initial evaluation, provide pt/family education and to maximize pt's level of independence in the home and community environment.      Pt's spiritual, cultural and educational needs considered and pt agreeable to plan of care and goals.     Anticipated barriers to physical therapy: None     Short Term Goals:   4 Weeks  1. Patient will demonstrate improved AROM/PROM of R knee to 140 degrees flexion.  2. Patient will demonstrate improved quadriceps strength by being able to perform 10 SLR's with 3 lbs. CW and with no signs of a knee extension lag on the RLE.  3. Patient will demonstrate improved R Knee Edema/Effusion to 36 cm  or less at the mid-patella.  4. Patient will demonstrate improved functional ability by showing 30% or less limitation according to FOTO.     Long Term Goal:   8 Weeks  1. Patient will demonstrate improved AROM of R Knee to 145 degrees knee flexion.  2. Patient will demonstrate improved quadriceps strength by being able to perform 10 SLR's with a 5 lbs. Cuff weight or more and no signs of a knee extension lag on the RLE.  3. Patient will demonstrate improved R Knee Edema/Effusion to 35.5 cm or less at the mid-patella.  4. Patient will demonstrate improved functional ability by showing 15% or less limitation according to FOTO.     Plan      Cont to progress towards goals set by PT.  Work to increase quad and hip strength next visit.  Drop down to once a week next week.    Niles Murillo, PTA

## 2019-02-05 ENCOUNTER — CLINICAL SUPPORT (OUTPATIENT)
Dept: REHABILITATION | Facility: HOSPITAL | Age: 54
End: 2019-02-05
Attending: ORTHOPAEDIC SURGERY
Payer: COMMERCIAL

## 2019-02-05 DIAGNOSIS — M62.81 MUSCLE WEAKNESS: ICD-10-CM

## 2019-02-05 DIAGNOSIS — M25.461 EFFUSION OF RIGHT KNEE: ICD-10-CM

## 2019-02-05 PROCEDURE — 97110 THERAPEUTIC EXERCISES: CPT

## 2019-02-05 NOTE — PROGRESS NOTES
"  Physical Therapy Daily Treatment & POC Update Note     Name: Grace Beltran  Clinic Number: [id number]    Therapy Diagnosis:   Encounter Diagnoses   Name Primary?    Muscle weakness     Effusion of right knee      Physician: Jay Quintana MD    Visit Date: 2/5/2019    Physician Orders: PT Eval and Treat Right Knee  Medical Diagnosis from Referral: M25.561,G89.29 (ICD-10-CM) - Chronic pain of right knee  Evaluation Date: 11/1/2018  Authorization Period Expiration: 12-    Plan of Care Expiration: 03-  Visit # / Visits authorized: 15/25    Time In: 0857  Time Out: 1002  Total Billable Time: 27 minutes    Precautions: Standard and See Precautions Below  - First 2 weeks 0 - 45 AROM/PROM allowed and quad/hip strengthening  - Second 2 weeks 0 - 90 AROM/PROM allowed and continue strengthening  - WBAT in Brace  - As of 11- according to patient per MD she is allowed to perform knee flexion to tolerance and is to maintain WBAT in the brace    Subjective     Pt reports w/ no c/o pn in R knee.    She was compliant with home exercise program.    Response to previous treatment: Quadriceps Soreness  Functional change: Improved closed chain knee extension    Objective     Grace received therapeutic exercises to develop strength, endurance, ROM and flexibility for 27 minutes including:    - Stationary Bike - 5 min. To increase blood flow  - heel slide 20x10 sec  - quad stretch off table c strap 4x20 sec   - SL clamshells 3# 3 x 10 (B)  - SL Bridge c LE crossed - 3x15  - Leg press single leg 3x10 60#    Step Ups 6" 3 x 10   - B HR 30 x 3 sec hold   - SLR 2# 30x     BELOW NOT PERFORMED TODAY  - Prone hip ext 30 x   - R Hammer Leg Extension - 2 x 15 w/ 2.5 lbs.  - R Supine SLR's w/ Heel Propped - 3x10 w/ 3 lbs. CW  - R Standing TKE's w/ Yellow Pull-Up Assist Band - 3x12  - Shuttle Double-leg Presses - 3x12 w/ 2 Black  - R Heel Sets - 30x5 sec. Holds  - R Standing TKE's - 30x2 sec. Holds w/ Maroon " TB  - Standing Bilateral Heel Raises - 4x10  - Fwd Step-Downs to Tolerance - 3x10  - Pilates Box Assisted Step-Ups - 3x10 w/ 4 Coils & BUE Assist  - R SLS w/ Reaching Cone Taps - 1xFatigue   - Fwd Steps-Ups onto Yellow Box - 3x8 w/ 10 lbs. DB  - Fwd Step-Downs on 3-Inch Box - 3x10  - R TRX Assisted Reverse Lunges - 3x12  - Goblet Sit<>Stands - 3x8 w/ 15 lbs. KB  - R Pilates Box Step-Ups/Downs 20 x , 10 x  w/ 4 Coils &  BUE Assist    Grace received the following manual therapy techniques: STM applied to: R Knee and hamstringfor 0 minutes, including:      Grace received cold pack to R Knee for 10 minutes.    Home Exercises Provided and Patient Education Provided     Written Home Exercises Provided: yes. PTA added mini squats, LAQ, B HR to HEP.    Exercises were reviewed and Grace was able to demonstrate them prior to the end of the session.  Grace demonstrated good  understanding of the education provided.     See EMR under Media for exercises provided 11/7/2018.    Assessment     Pt showed increased quad and hip strength.  Pt cont to lack some core stability and balance.  Pt had no adverse effects from tx.    Grace is progressing well towards her goals.   Pt prognosis is Excellent.      Pt will continue to benefit from skilled outpatient physical therapy to address the deficits listed in the problem list box on initial evaluation, provide pt/family education and to maximize pt's level of independence in the home and community environment.      Pt's spiritual, cultural and educational needs considered and pt agreeable to plan of care and goals.     Anticipated barriers to physical therapy: None     Short Term Goals:   4 Weeks  1. Patient will demonstrate improved AROM/PROM of R knee to 140 degrees flexion.  2. Patient will demonstrate improved quadriceps strength by being able to perform 10 SLR's with 3 lbs. CW and with no signs of a knee extension lag on the RLE.  3. Patient will demonstrate improved R Knee  Edema/Effusion to 36 cm or less at the mid-patella.  4. Patient will demonstrate improved functional ability by showing 30% or less limitation according to FOTO.     Long Term Goal:   8 Weeks  1. Patient will demonstrate improved AROM of R Knee to 145 degrees knee flexion.  2. Patient will demonstrate improved quadriceps strength by being able to perform 10 SLR's with a 5 lbs. Cuff weight or more and no signs of a knee extension lag on the RLE.  3. Patient will demonstrate improved R Knee Edema/Effusion to 35.5 cm or less at the mid-patella.  4. Patient will demonstrate improved functional ability by showing 15% or less limitation according to FOTO.     Plan      Cont to progress towards goals set by PT.  Work to increase quad and hip strength next visit.  Drop down to once a week next week.    Niles Murillo, PTA

## 2019-03-12 ENCOUNTER — PATIENT MESSAGE (OUTPATIENT)
Dept: FAMILY MEDICINE | Facility: CLINIC | Age: 54
End: 2019-03-12

## 2019-03-12 DIAGNOSIS — Z12.11 SCREEN FOR COLON CANCER: Primary | ICD-10-CM

## 2019-03-14 DIAGNOSIS — Z12.11 SPECIAL SCREENING FOR MALIGNANT NEOPLASMS, COLON: Primary | ICD-10-CM

## 2019-03-14 RX ORDER — POLYETHYLENE GLYCOL 3350, SODIUM SULFATE ANHYDROUS, SODIUM BICARBONATE, SODIUM CHLORIDE, POTASSIUM CHLORIDE 236; 22.74; 6.74; 5.86; 2.97 G/4L; G/4L; G/4L; G/4L; G/4L
4 POWDER, FOR SOLUTION ORAL ONCE
Qty: 4000 ML | Refills: 0 | Status: SHIPPED | OUTPATIENT
Start: 2019-03-14 | End: 2019-03-14

## 2019-04-05 ENCOUNTER — NURSE TRIAGE (OUTPATIENT)
Dept: ADMINISTRATIVE | Facility: CLINIC | Age: 54
End: 2019-04-05

## 2019-04-05 NOTE — TELEPHONE ENCOUNTER
Reason for Disposition   Question about upcoming scheduled test, no triage required and triager able to answer question    Protocols used: INFORMATION ONLY CALL-A-AH  Colonoscopy scheduled 4/8. Instructions offered. Call back with questions.

## 2019-04-08 ENCOUNTER — ANESTHESIA EVENT (OUTPATIENT)
Dept: ENDOSCOPY | Facility: HOSPITAL | Age: 54
End: 2019-04-08
Payer: COMMERCIAL

## 2019-04-08 ENCOUNTER — ANESTHESIA (OUTPATIENT)
Dept: ENDOSCOPY | Facility: HOSPITAL | Age: 54
End: 2019-04-08
Payer: COMMERCIAL

## 2019-04-08 ENCOUNTER — HOSPITAL ENCOUNTER (OUTPATIENT)
Facility: HOSPITAL | Age: 54
Discharge: HOME | End: 2019-04-08
Attending: COLON & RECTAL SURGERY | Admitting: COLON & RECTAL SURGERY
Payer: COMMERCIAL

## 2019-04-08 VITALS
TEMPERATURE: 98 F | SYSTOLIC BLOOD PRESSURE: 115 MMHG | HEIGHT: 64 IN | RESPIRATION RATE: 18 BRPM | HEART RATE: 62 BPM | OXYGEN SATURATION: 100 % | WEIGHT: 140 LBS | BODY MASS INDEX: 23.9 KG/M2 | DIASTOLIC BLOOD PRESSURE: 69 MMHG

## 2019-04-08 DIAGNOSIS — Z12.11 SCREENING FOR COLON CANCER: ICD-10-CM

## 2019-04-08 PROCEDURE — 63600175 PHARM REV CODE 636 W HCPCS: Performed by: NURSE ANESTHETIST, CERTIFIED REGISTERED

## 2019-04-08 PROCEDURE — E9220 PRA ENDO ANESTHESIA: HCPCS | Mod: ,,, | Performed by: NURSE ANESTHETIST, CERTIFIED REGISTERED

## 2019-04-08 PROCEDURE — 25000003 PHARM REV CODE 250: Performed by: NURSE PRACTITIONER

## 2019-04-08 PROCEDURE — G0121 COLON CA SCRN NOT HI RSK IND: ICD-10-PCS | Mod: 53,,, | Performed by: COLON & RECTAL SURGERY

## 2019-04-08 PROCEDURE — G0121 COLON CA SCRN NOT HI RSK IND: HCPCS | Mod: 53,,, | Performed by: COLON & RECTAL SURGERY

## 2019-04-08 PROCEDURE — 37000008 HC ANESTHESIA 1ST 15 MINUTES: Performed by: COLON & RECTAL SURGERY

## 2019-04-08 PROCEDURE — E9220 PRA ENDO ANESTHESIA: ICD-10-PCS | Mod: ,,, | Performed by: NURSE ANESTHETIST, CERTIFIED REGISTERED

## 2019-04-08 PROCEDURE — 37000009 HC ANESTHESIA EA ADD 15 MINS: Performed by: COLON & RECTAL SURGERY

## 2019-04-08 PROCEDURE — G0121 COLON CA SCRN NOT HI RSK IND: HCPCS | Performed by: COLON & RECTAL SURGERY

## 2019-04-08 RX ORDER — PROPOFOL 10 MG/ML
VIAL (ML) INTRAVENOUS CONTINUOUS PRN
Status: DISCONTINUED | OUTPATIENT
Start: 2019-04-08 | End: 2019-04-08

## 2019-04-08 RX ORDER — SODIUM CHLORIDE 0.9 % (FLUSH) 0.9 %
10 SYRINGE (ML) INJECTION
Status: DISCONTINUED | OUTPATIENT
Start: 2019-04-08 | End: 2019-04-08 | Stop reason: HOSPADM

## 2019-04-08 RX ORDER — PROPOFOL 10 MG/ML
VIAL (ML) INTRAVENOUS
Status: DISCONTINUED | OUTPATIENT
Start: 2019-04-08 | End: 2019-04-08

## 2019-04-08 RX ORDER — SODIUM CHLORIDE 9 MG/ML
INJECTION, SOLUTION INTRAVENOUS CONTINUOUS
Status: DISCONTINUED | OUTPATIENT
Start: 2019-04-08 | End: 2019-04-08 | Stop reason: HOSPADM

## 2019-04-08 RX ORDER — LIDOCAINE HCL/PF 100 MG/5ML
SYRINGE (ML) INTRAVENOUS
Status: DISCONTINUED | OUTPATIENT
Start: 2019-04-08 | End: 2019-04-08

## 2019-04-08 RX ADMIN — PROPOFOL 70 MG: 10 INJECTION, EMULSION INTRAVENOUS at 12:04

## 2019-04-08 RX ADMIN — LIDOCAINE HYDROCHLORIDE 75 MG: 20 INJECTION, SOLUTION INTRAVENOUS at 12:04

## 2019-04-08 RX ADMIN — PROPOFOL 150 MCG/KG/MIN: 10 INJECTION, EMULSION INTRAVENOUS at 12:04

## 2019-04-08 RX ADMIN — SODIUM CHLORIDE: 0.9 INJECTION, SOLUTION INTRAVENOUS at 12:04

## 2019-04-08 RX ADMIN — SODIUM CHLORIDE: 0.9 INJECTION, SOLUTION INTRAVENOUS at 11:04

## 2019-04-08 NOTE — TRANSFER OF CARE
"Anesthesia Transfer of Care Note    Patient: Grace Beltran    Procedure(s) Performed: Procedure(s) (LRB):  COLONOSCOPY (N/A)    Patient location: PACU    Anesthesia Type: general    Transport from OR: Transported from OR on room air with adequate spontaneous ventilation    Post pain: adequate analgesia    Post assessment: no apparent anesthetic complications and tolerated procedure well    Post vital signs: stable    Level of consciousness: sedated and responds to stimulation    Nausea/Vomiting: no nausea/vomiting    Complications: none    Transfer of care protocol was followed      Last vitals:   Visit Vitals  /84   Pulse 68   Temp 37.1 °C (98.8 °F)   Resp 14   Ht 5' 4" (1.626 m)   Wt 63.5 kg (140 lb)   LMP 11/29/2016 (Approximate)   SpO2 98%   Breastfeeding? No   BMI 24.03 kg/m²     "

## 2019-04-08 NOTE — ANESTHESIA POSTPROCEDURE EVALUATION
Anesthesia Post Evaluation    Patient: Grace Beltran    Procedure(s) Performed: Procedure(s) (LRB):  COLONOSCOPY (N/A)    Final Anesthesia Type: general  Patient location during evaluation: PACU  Patient participation: Yes- Able to Participate  Level of consciousness: awake and alert and oriented  Post-procedure vital signs: reviewed and stable  Pain management: adequate  Airway patency: patent  PONV status at discharge: No PONV  Anesthetic complications: no      Cardiovascular status: blood pressure returned to baseline and hemodynamically stable  Respiratory status: unassisted, room air and spontaneous ventilation  Hydration status: euvolemic  Follow-up not needed.          Vitals Value Taken Time   /69 4/8/2019  1:32 PM   Temp 36.6 °C (97.9 °F) 4/8/2019  1:12 PM   Pulse 62 4/8/2019  1:32 PM   Resp 18 4/8/2019  1:27 PM   SpO2 100 % 4/8/2019  1:27 PM         No case tracking events are documented in the log.      Pain/Rajendra Score: Rajendra Score: 10 (4/8/2019  1:33 PM)

## 2019-04-08 NOTE — [employer] PATIENT INSTRUCTIONS
Discharge Summary/Instructions after an Endoscopic Procedure  Patient Name: Grace Beltran  Patient MRN: [id number]  Patient YOB: 1965 Monday, April 08, 2019  Rakesh Parmar MD  RESTRICTIONS:  During your procedure today, you received medications for sedation.  These   medications may affect your judgment, balance and coordination.  Therefore,   for 24 hours, you have the following restrictions:   - DO NOT drive a car, operate machinery, make legal/financial decisions,   sign important papers or drink alcohol.    ACTIVITY:  Today: no heavy lifting, straining or running due to procedural   sedation/anesthesia.  The following day: return to full activity including work.  DIET:  Eat and drink normally unless instructed otherwise.     TREATMENT FOR COMMON SIDE EFFECTS:  - Mild abdominal pain, nausea, belching, bloating or excessive gas:  rest,   eat lightly and use a heating pad.  - Sore Throat: treat with throat lozenges and/or gargle with warm salt   water.  - Because air was used during the procedure, expelling large amounts of air   from your rectum or belching is normal.  - If a bowel prep was taken, you may not have a bowel movement for 1-3 days.    This is normal.  SYMPTOMS TO WATCH FOR AND REPORT TO YOUR PHYSICIAN:  1. Abdominal pain or bloating, other than gas cramps.  2. Chest pain.  3. Back pain.  4. Signs of infection such as: chills or fever occurring within 24 hours   after the procedure.  5. Rectal bleeding, which would show as bright red, maroon, or black stools.   (A tablespoon of blood from the rectum is not serious, especially if   hemorrhoids are present.)  6. Vomiting.  7. Weakness or dizziness.  GO DIRECTLY TO THE NEAREST EMERGENCY ROOM IF YOU HAVE ANY OF THE FOLLOWING:      Difficulty breathing              Chills and/or fever over 101 F   Persistent vomiting and/or vomiting blood   Severe abdominal pain   Severe chest pain   Black, tarry stools   Bleeding- more than one  tablespoon   Any other symptom or condition that you feel may need urgent attention  Your doctor recommends these additional instructions:  If any biopsies were taken, your doctors clinic will contact you in 1 to 2   weeks with any results.  - Patient has a contact number available for emergencies.  The signs and   symptoms of potential delayed complications were discussed with the   patient.  Return to normal activities tomorrow.  Written discharge   instructions were provided to the patient.   - Discharge patient to home (ambulatory).   - Resume regular diet indefinitely.   - Continue present medications.   - Perform an air contrast barium enema at appointment to be scheduled.   - Repeat colonoscopy PRN for screening purposes.  For questions, problems or results please call your physician - Rakesh Parmar MD at Work:  [phone].  [facility], EMERGENCY ROOM PHONE NUMBER: [phone]  IF A COMPLICATION OR EMERGENCY SITUATION ARISES AND YOU ARE UNABLE TO REACH   YOUR PHYSICIAN - GO DIRECTLY TO THE EMERGENCY ROOM.  Rakesh Parmar MD  4/8/2019 1:09:58 PM  This report has been verified and signed electronically.  [employer]

## 2019-04-08 NOTE — ANESTHESIA PREPROCEDURE EVALUATION
04/08/2019  Grace Beltran is a 53 y.o., female.    Anesthesia Evaluation    I have reviewed the Patient Summary Reports.    I have reviewed the Nursing Notes.   I have reviewed the Medications.     Review of Systems  Anesthesia Hx:  No problems with previous Anesthesia Denies Hx of Anesthetic complications    Hematology/Oncology:  Hematology Normal   Oncology Normal     EENT/Dental:EENT/Dental Normal   Cardiovascular:  Cardiovascular Normal     Pulmonary:  Pulmonary Normal    Renal/:  Renal/ Normal     Hepatic/GI:  Hepatic/GI Normal    Musculoskeletal:   Arthritis     Neurological:  Neurology Normal    Endocrine:  Endocrine Normal    Dermatological:  Skin Normal    Psych:  Psychiatric Normal           Physical Exam  General:  Well nourished    Airway/Jaw/Neck:  Airway Findings: Mouth Opening: Normal Tongue: Normal  General Airway Assessment: Adult  Mallampati: III  Improves to II with phonation.  TM Distance: Normal, at least 6 cm        Eyes/Ears/Nose:  EYES/EARS/NOSE FINDINGS: Normal   Dental:  DENTAL FINDINGS: Normal   Chest/Lungs:  Chest/Lungs Clear    Heart/Vascular:  Heart Findings: Normal Heart murmur: negative Vascular Findings: Normal    Abdomen:  Abdomen Findings: Normal    Musculoskeletal:  Musculoskeletal Findings: Normal   Skin:  Skin Findings: Normal    Mental Status:  Mental Status Findings: Normal        Anesthesia Plan  Type of Anesthesia, risks & benefits discussed:  Anesthesia Type:  general  Patient's Preference: General  Intra-op Monitoring Plan: standard ASA monitors  Intra-op Monitoring Plan Comments:   Post Op Pain Control Plan:   Post Op Pain Control Plan Comments:   Induction:   IV  Beta Blocker:  Patient is not currently on a Beta-Blocker (No further documentation required).       Informed Consent: Patient understands risks and agrees with Anesthesia plan.  Questions  answered. Anesthesia consent signed with patient.  ASA Score: 2     Day of Surgery Review of History & Physical: I have interviewed and examined the patient. I have reviewed the patient's H&P dated:  There are no significant changes.  H&P update referred to the surgeon.         Ready For Surgery From Anesthesia Perspective.

## 2019-04-08 NOTE — H&P
COLONOSCOPY HISTORY AND PE    Procedure : Colonoscopy      INDICATIONS: asymptomatic screening exam    Family Hx of CRC: none    Last Colonoscopy:  none  Findings: na       Past Medical History:   Diagnosis Date    Acne     Dermatologist prescribes spironolactone     Anxiety     celexa 39    Chronic constipation 5/24/2017    Hot flashes due to menopause 5/24/2017    Osteopenia of spine 5/24/2017    Primary osteoarthritis of left hip 05/24/2017    [facility] needling at [facility], MRI 2016, Injection didn't help much    Right elbow pain 07/20/2017    dry needling at [facility]     Sedation Problems: NO  Family History   Problem Relation Age of Onset    Hypertension Mother     Hyperlipidemia Mother     Osteoporosis Mother     Breast cancer Maternal Aunt 65     Fam Hx of Sedation Problems: NO  Social History     Socioeconomic History    Marital status: Single     Spouse name: Not on file    Number of children: Not on file    Years of education: Not on file    Highest education level: Not on file   Occupational History    Not on file   Social Needs    Financial resource strain: Not on file    Food insecurity:     Worry: Not on file     Inability: Not on file    Transportation needs:     Medical: Not on file     Non-medical: Not on file   Tobacco Use    Smoking status: Never Smoker    Smokeless tobacco: Never Used   Substance and Sexual Activity    Alcohol use: Yes     Alcohol/week: 2.4 oz     Types: 4 Standard drinks or equivalent per week     Comment: 4 beers a week    Drug use: No    Sexual activity: Yes     Partners: Female   Lifestyle    Physical activity:     Days per week: Not on file     Minutes per session: Not on file    Stress: Not on file   Relationships    Social connections:     Talks on phone: Not on file     Gets together: Not on file     Attends Cheondoism service: Not on file     Active member of club or organization: Not on file     Attends meetings of clubs or  "organizations: Not on file     Relationship status: Not on file   Other Topics Concern    Not on file   Social History Narrative     with EMERITA (works with HIV, Hepatitis C), female partner    2 daughters  - one is a competitive cheerleader with [employer] ([place]), 1 son (Yves)    Nonsmoker, ETOH 4 x / week    GYN [facility], never had colonoscopy       Review of Systems - Negative except   Respiratory ROS: no dyspnea  Cardiovascular ROS: no exertional chest pain  Gastrointestinal ROS: NO abdominal discomfort,  NO rectal bleeding  Musculoskeletal ROS: no muscular pain  Neurological ROS: no recent stroke    Physical Exam:  /84   Pulse 68   Temp 98.8 °F (37.1 °C)   Resp 14   Ht 5' 4" (1.626 m)   Wt 63.5 kg (140 lb)   LMP 11/29/2016 (Approximate)   SpO2 98%   Breastfeeding? No   BMI 24.03 kg/m²   General: no distress  Head: normocephalic  Mallampati Score   Neck: supple, symmetrical, trachea midline  Lungs:  normal respiratory effort  Heart: regular rate and rhythm  Abdomen: soft, non-tender non-distented; bowel sounds normal; no masses,  no organomegaly  Extremities: no cyanosis or edema, or clubbing    ASA:  II    PLAN  COLONOSCOPY.    SedationPlan :MAC    The details of the procedure, the possible need for biopsy or polypectomy and the potential risks including bleeding, perforation, missed polyps were discussed in detail.      "

## 2019-04-10 ENCOUNTER — TELEPHONE (OUTPATIENT)
Dept: GASTROENTEROLOGY | Facility: CLINIC | Age: 54
End: 2019-04-10

## 2019-04-10 DIAGNOSIS — Z12.11 COLON CANCER SCREENING: Primary | ICD-10-CM

## 2019-04-15 ENCOUNTER — TELEPHONE (OUTPATIENT)
Dept: ENDOSCOPY | Facility: HOSPITAL | Age: 54
End: 2019-04-15

## 2019-04-15 ENCOUNTER — TELEPHONE (OUTPATIENT)
Dept: SURGERY | Facility: CLINIC | Age: 54
End: 2019-04-15

## 2019-04-15 NOTE — TELEPHONE ENCOUNTER
----- Message from Andie Tyson sent at 4/15/2019  3:57 PM CDT -----  Contact: Self- [phone]  Ghulam- pt states she was told she needed to have another c-scope with Dr. Parmar following scope on 4/8- may need orders- please contact pt at [phone]

## 2019-04-15 NOTE — TELEPHONE ENCOUNTER
Spoke with patient. Informed her I will clarify with Dr. Parmar what the next step is for her plan of care and call her back.

## 2019-04-16 NOTE — TELEPHONE ENCOUNTER
Spoke with patient. Balloon colonoscopy scheduled for 6/13. Endo scheduling nurse will contact patient with prep.

## 2019-04-16 NOTE — TELEPHONE ENCOUNTER
Received referral from Dr. Parmar for device-assisted colonoscopy for recent incomplete screening colonoscopy due to looping in the colon.    Order placed and will have staff contact patient for scheduling.

## 2019-04-17 ENCOUNTER — TELEPHONE (OUTPATIENT)
Dept: SURGERY | Facility: CLINIC | Age: 54
End: 2019-04-17

## 2019-05-13 ENCOUNTER — PATIENT MESSAGE (OUTPATIENT)
Dept: PRIMARY CARE CLINIC | Facility: CLINIC | Age: 54
End: 2019-05-13

## 2019-05-13 DIAGNOSIS — Z00.00 ANNUAL PHYSICAL EXAM: Primary | ICD-10-CM

## 2019-05-27 ENCOUNTER — TELEPHONE (OUTPATIENT)
Dept: ENDOSCOPY | Facility: HOSPITAL | Age: 54
End: 2019-05-27

## 2019-05-27 NOTE — TELEPHONE ENCOUNTER
Attempted to contact patient regarding device-assisted colonoscopy scheduled for 6/13/19 at 0930, left message on voice mail. Need to review medical history/medications, prep instructions and pharmacy where she wants prep e-scribed.

## 2019-05-28 ENCOUNTER — TELEPHONE (OUTPATIENT)
Dept: ENDOSCOPY | Facility: HOSPITAL | Age: 54
End: 2019-05-28

## 2019-05-28 DIAGNOSIS — Z12.11 SPECIAL SCREENING FOR MALIGNANT NEOPLASMS, COLON: Primary | ICD-10-CM

## 2019-05-28 RX ORDER — POLYETHYLENE GLYCOL 3350, SODIUM SULFATE ANHYDROUS, SODIUM BICARBONATE, SODIUM CHLORIDE, POTASSIUM CHLORIDE 236; 22.74; 6.74; 5.86; 2.97 G/4L; G/4L; G/4L; G/4L; G/4L
POWDER, FOR SOLUTION ORAL
Qty: 4000 ML | Refills: 0 | Status: ON HOLD | OUTPATIENT
Start: 2019-05-28 | End: 2019-06-13 | Stop reason: HOSPADM

## 2019-05-28 NOTE — TELEPHONE ENCOUNTER
Spoke to pt, she is scheduled for a balloon colonoscopy with Dr Sravan Haji on 6/13/19 at 9:30 am, medical history/medications reviewed, prep instructions mailed to pt.

## 2019-06-13 ENCOUNTER — HOSPITAL ENCOUNTER (OUTPATIENT)
Facility: HOSPITAL | Age: 54
Discharge: HOME | End: 2019-06-13
Attending: INTERNAL MEDICINE | Admitting: INTERNAL MEDICINE
Payer: COMMERCIAL

## 2019-06-13 ENCOUNTER — ANESTHESIA EVENT (OUTPATIENT)
Dept: ENDOSCOPY | Facility: HOSPITAL | Age: 54
End: 2019-06-13
Payer: COMMERCIAL

## 2019-06-13 ENCOUNTER — ANESTHESIA (OUTPATIENT)
Dept: ENDOSCOPY | Facility: HOSPITAL | Age: 54
End: 2019-06-13
Payer: COMMERCIAL

## 2019-06-13 VITALS
HEIGHT: 64 IN | WEIGHT: 145 LBS | RESPIRATION RATE: 16 BRPM | SYSTOLIC BLOOD PRESSURE: 134 MMHG | TEMPERATURE: 98 F | HEART RATE: 61 BPM | OXYGEN SATURATION: 100 % | BODY MASS INDEX: 24.75 KG/M2 | DIASTOLIC BLOOD PRESSURE: 59 MMHG

## 2019-06-13 DIAGNOSIS — Z12.11 COLON CANCER SCREENING: ICD-10-CM

## 2019-06-13 DIAGNOSIS — Z12.11 SCREENING FOR COLON CANCER: Primary | ICD-10-CM

## 2019-06-13 PROCEDURE — D9220A PRA ANESTHESIA: Mod: ANES,,, | Performed by: ANESTHESIOLOGY

## 2019-06-13 PROCEDURE — 63600175 PHARM REV CODE 636 W HCPCS: Performed by: NURSE ANESTHETIST, CERTIFIED REGISTERED

## 2019-06-13 PROCEDURE — D9220A PRA ANESTHESIA: Mod: CRNA,,, | Performed by: NURSE ANESTHETIST, CERTIFIED REGISTERED

## 2019-06-13 PROCEDURE — 25000003 PHARM REV CODE 250: Performed by: INTERNAL MEDICINE

## 2019-06-13 PROCEDURE — 37000008 HC ANESTHESIA 1ST 15 MINUTES: Performed by: INTERNAL MEDICINE

## 2019-06-13 PROCEDURE — G0121 COLON CA SCRN NOT HI RSK IND: HCPCS | Performed by: INTERNAL MEDICINE

## 2019-06-13 PROCEDURE — G0121 COLON CA SCRN NOT HI RSK IND: ICD-10-PCS | Mod: ,,, | Performed by: INTERNAL MEDICINE

## 2019-06-13 PROCEDURE — D9220A PRA ANESTHESIA: ICD-10-PCS | Mod: CRNA,,, | Performed by: NURSE ANESTHETIST, CERTIFIED REGISTERED

## 2019-06-13 PROCEDURE — G0121 COLON CA SCRN NOT HI RSK IND: HCPCS | Mod: ,,, | Performed by: INTERNAL MEDICINE

## 2019-06-13 PROCEDURE — D9220A PRA ANESTHESIA: ICD-10-PCS | Mod: ANES,,, | Performed by: ANESTHESIOLOGY

## 2019-06-13 PROCEDURE — 37000009 HC ANESTHESIA EA ADD 15 MINS: Performed by: INTERNAL MEDICINE

## 2019-06-13 RX ORDER — MEPERIDINE HYDROCHLORIDE 25 MG/ML
12.5 INJECTION INTRAMUSCULAR; INTRAVENOUS; SUBCUTANEOUS ONCE AS NEEDED
Status: DISCONTINUED | OUTPATIENT
Start: 2019-06-13 | End: 2019-06-13 | Stop reason: HOSPADM

## 2019-06-13 RX ORDER — LIDOCAINE HCL/PF 100 MG/5ML
SYRINGE (ML) INTRAVENOUS
Status: DISCONTINUED | OUTPATIENT
Start: 2019-06-13 | End: 2019-06-13

## 2019-06-13 RX ORDER — PROPOFOL 10 MG/ML
VIAL (ML) INTRAVENOUS CONTINUOUS PRN
Status: DISCONTINUED | OUTPATIENT
Start: 2019-06-13 | End: 2019-06-13

## 2019-06-13 RX ORDER — SODIUM CHLORIDE 9 MG/ML
INJECTION, SOLUTION INTRAVENOUS CONTINUOUS
Status: DISCONTINUED | OUTPATIENT
Start: 2019-06-13 | End: 2019-06-13 | Stop reason: HOSPADM

## 2019-06-13 RX ORDER — LORAZEPAM 2 MG/ML
0.25 INJECTION INTRAMUSCULAR ONCE AS NEEDED
Status: DISCONTINUED | OUTPATIENT
Start: 2019-06-13 | End: 2019-06-13 | Stop reason: HOSPADM

## 2019-06-13 RX ORDER — MIDAZOLAM HYDROCHLORIDE 1 MG/ML
INJECTION, SOLUTION INTRAMUSCULAR; INTRAVENOUS
Status: DISCONTINUED | OUTPATIENT
Start: 2019-06-13 | End: 2019-06-13

## 2019-06-13 RX ORDER — PROPOFOL 10 MG/ML
VIAL (ML) INTRAVENOUS
Status: DISCONTINUED | OUTPATIENT
Start: 2019-06-13 | End: 2019-06-13

## 2019-06-13 RX ADMIN — SODIUM CHLORIDE: 0.9 INJECTION, SOLUTION INTRAVENOUS at 09:06

## 2019-06-13 RX ADMIN — MIDAZOLAM HYDROCHLORIDE 2 MG: 1 INJECTION, SOLUTION INTRAMUSCULAR; INTRAVENOUS at 11:06

## 2019-06-13 RX ADMIN — LIDOCAINE HYDROCHLORIDE 100 MG: 20 INJECTION, SOLUTION INTRAVENOUS at 11:06

## 2019-06-13 RX ADMIN — PROPOFOL 150 MCG/KG/MIN: 10 INJECTION, EMULSION INTRAVENOUS at 11:06

## 2019-06-13 RX ADMIN — PROPOFOL 80 MG: 10 INJECTION, EMULSION INTRAVENOUS at 11:06

## 2019-06-13 NOTE — [employer] PATIENT INSTRUCTIONS
Discharge Summary/Instructions after an Endoscopic Procedure  Patient Name: Grace Beltran  Patient MRN: [id number]  Patient YOB: 1965 Thursday, June 13, 2019  Sravan Haji MD  RESTRICTIONS:  During your procedure today, you received medications for sedation.  These   medications may affect your judgment, balance and coordination.  Therefore,   for 24 hours, you have the following restrictions:   - DO NOT drive a car, operate machinery, make legal/financial decisions,   sign important papers or drink alcohol.    ACTIVITY:  Today: no heavy lifting, straining or running due to procedural   sedation/anesthesia.  The following day: return to full activity including work.  DIET:  Eat and drink normally unless instructed otherwise.     TREATMENT FOR COMMON SIDE EFFECTS:  - Mild abdominal pain, nausea, belching, bloating or excessive gas:  rest,   eat lightly and use a heating pad.  - Sore Throat: treat with throat lozenges and/or gargle with warm salt   water.  - Because air was used during the procedure, expelling large amounts of air   from your rectum or belching is normal.  - If a bowel prep was taken, you may not have a bowel movement for 1-3 days.    This is normal.  SYMPTOMS TO WATCH FOR AND REPORT TO YOUR PHYSICIAN:  1. Abdominal pain or bloating, other than gas cramps.  2. Chest pain.  3. Back pain.  4. Signs of infection such as: chills or fever occurring within 24 hours   after the procedure.  5. Rectal bleeding, which would show as bright red, maroon, or black stools.   (A tablespoon of blood from the rectum is not serious, especially if   hemorrhoids are present.)  6. Vomiting.  7. Weakness or dizziness.  GO DIRECTLY TO THE NEAREST EMERGENCY ROOM IF YOU HAVE ANY OF THE FOLLOWING:      Difficulty breathing              Chills and/or fever over 101 F   Persistent vomiting and/or vomiting blood   Severe abdominal pain   Severe chest pain   Black, tarry stools   Bleeding- more than one  tablespoon   Any other symptom or condition that you feel may need urgent attention  Your doctor recommends these additional instructions:  If any biopsies were taken, your doctors clinic will contact you in 1 to 2   weeks with any results.  - Discharge patient to home.   - Patient has a contact number available for emergencies.  The signs and   symptoms of potential delayed complications were discussed with the   patient.  Return to normal activities tomorrow.  Written discharge   instructions were provided to the patient.   - Resume previous diet.   - Continue present medications.   - Repeat colonoscopy in 10 years for screening purposes.  For questions, problems or results please call your physician - Sravan Haji MD at Work:  [phone].  [facility], EMERGENCY ROOM PHONE NUMBER: [phone]  IF A COMPLICATION OR EMERGENCY SITUATION ARISES AND YOU ARE UNABLE TO REACH   YOUR PHYSICIAN - GO DIRECTLY TO THE EMERGENCY ROOM.  Sravan Haji MD  6/13/2019 12:17:13 PM  This report has been verified and signed electronically.  [employer]

## 2019-06-13 NOTE — ANESTHESIA PREPROCEDURE EVALUATION
06/13/2019  Grace Beltran is a 53 y.o., female.    Anesthesia Evaluation    I have reviewed the Patient Summary Reports.    I have reviewed the Nursing Notes.      Review of Systems  Anesthesia Hx:  No problems with previous Anesthesia    Hematology/Oncology:  Hematology Normal   Oncology Normal     EENT/Dental:EENT/Dental Normal   Cardiovascular:  Cardiovascular Normal     Pulmonary:  Pulmonary Normal    Renal/:  Renal/ Normal     Hepatic/GI:  Hepatic/GI Normal    Musculoskeletal:   Arthritis     Neurological:  Neurology Normal    Endocrine:  Endocrine Normal    Dermatological:  Skin Normal    Psych:   anxiety          Physical Exam  General:  Well nourished    Airway/Jaw/Neck:  Airway Findings: Mouth Opening: Normal Tongue: Normal  General Airway Assessment: Adult  Mallampati: II  TM Distance: Normal, at least 6 cm        Eyes/Ears/Nose:  EYES/EARS/NOSE FINDINGS: Normal   Dental:  Dental Findings: In tact   Chest/Lungs:  Chest/Lungs Clear    Heart/Vascular:  Heart Findings: Normal Heart murmur: negative Vascular Findings: Normal    Abdomen:  Abdomen Findings: Normal    Musculoskeletal:  Musculoskeletal Findings: Normal   Skin:  Skin Findings: Normal    Mental Status:  Mental Status Findings: Normal        Anesthesia Plan  Type of Anesthesia, risks & benefits discussed:  Anesthesia Type:  general  Patient's Preference:   Intra-op Monitoring Plan:   Intra-op Monitoring Plan Comments:   Post Op Pain Control Plan:   Post Op Pain Control Plan Comments:   Induction:   IV  Beta Blocker:  Patient is not currently on a Beta-Blocker (No further documentation required).       Informed Consent: Patient understands risks and agrees with Anesthesia plan.  Questions answered. Anesthesia consent signed with patient.  ASA Score: 2     Day of Surgery Review of History & Physical:    H&P update referred to the  surgeon.         Ready For Surgery From Anesthesia Perspective.

## 2019-06-13 NOTE — H&P
Short Stay Endoscopy History and Physical      Procedure - Colonoscopy, possible balloon-overtube assisted  ASA - per anesthesia  Mallampati - per anesthesia  History of Anesthesia problems - no  Family history Anesthesia problems - no   Plan of anesthesia - MAC    HPI:  This is a 53 y.o. female here for colon cancer screening.  Prior incomplete colonoscopy due to looping.  Reached the ascending colon.      ROS:  Constitutional: No fevers, chills, No weight loss  CV: No chest pain  Pulm: No cough, No shortness of breath  GI: see HPI    Medical History:  has a past medical history of Acne, Anxiety, Chronic constipation (5/24/2017), Hot flashes due to menopause (5/24/2017), Osteopenia of spine (5/24/2017), Primary osteoarthritis of left hip (05/24/2017), and Right elbow pain (07/20/2017).    Surgical History:  has a past surgical history that includes right hip surgery and Colonoscopy (N/A, 4/8/2019).    Family History: family history includes Breast cancer (age of onset: 65) in her maternal aunt; Hyperlipidemia in her mother; Hypertension in her mother; Osteoporosis in her mother.    Social History:  reports that she has never smoked. She has never used smokeless tobacco. She reports that she drinks about 2.4 oz of alcohol per week. She reports that she does not use drugs.    Review of patient's allergies indicates:   Allergen Reactions    Codeine      Other reaction(s): Nausea    Hydrocodone      Other reaction(s): Vomiting  Other reaction(s): Nausea    Promethazine      Other reaction(s): Vomiting  Other reaction(s): Nausea       Medications:   Medications Prior to Admission   Medication Sig Dispense Refill Last Dose    citalopram (CELEXA) 40 MG tablet take 1 tablet by mouth once daily 90 tablet 3 6/12/2019 at Unknown time    polyethylene glycol (GOLYTELY,NULYTELY) 236-22.74-6.74 -5.86 gram suspension As directed 4000 mL 0 6/13/2019 at 0415         Physical Exam:    Vital Signs:   Vitals:    06/13/19 0913    BP: 126/83   Pulse: 70   Resp: 16   Temp: 98.1 °F (36.7 °C)       General Appearance: Well appearing in no acute distress  Eyes:    No scleral icterus  ENT: Neck supple, Lips, mucosa, and tongue normal; teeth and gums normal  Lungs: CTA bilaterally  Heart:  S1, S2 normal, no murmurs heard  Abdomen: Soft, non tender, non distended with positive bowel sounds. No hepatosplenomegaly, ascites, or mass.      Labs:  Lab Results   Component Value Date    WBC 4.44 06/13/2018    HGB 13.5 06/13/2018    HCT 40.7 06/13/2018     06/13/2018    CHOL 220 (H) 06/13/2018    TRIG 91 06/13/2018    HDL 70 06/13/2018    ALT 23 06/13/2018    AST 20 06/13/2018     06/13/2018    K 4.1 06/13/2018     06/13/2018    CREATININE 0.8 06/13/2018    BUN 12 06/13/2018    CO2 28 06/13/2018    TSH 1.077 05/18/2017         Assessment:  53 y.o. female here for colon cancer screening.  History of incomplete colonoscopy.    Plan:  Proceed with colonoscopy today, balloon overtube-assisted if needed.  I have explained the risks and benefits of endoscopy procedures to the patient including but not limited to bleeding, perforation, infection, and death.  All questions and answered.        Sravan Haji MD

## 2019-06-13 NOTE — PLAN OF CARE
Dc instructions given to pt. Understanding verbalized. Pt with no c/o pain. No nausea. Pt going home with family.

## 2019-06-13 NOTE — ANESTHESIA POSTPROCEDURE EVALUATION
Anesthesia Post Evaluation    Patient: Grace Beltran    Procedure(s) Performed: Procedure(s) (LRB):  COLONOSCOPY - Device-assisted (N/A)    Final Anesthesia Type: general  Patient location during evaluation: PACU  Patient participation: Yes- Able to Participate  Level of consciousness: awake and alert  Post-procedure vital signs: reviewed and stable  Pain management: adequate  Airway patency: patent  PONV status at discharge: No PONV  Anesthetic complications: no      Cardiovascular status: blood pressure returned to baseline  Respiratory status: spontaneous ventilation and room air  Hydration status: euvolemic  Follow-up not needed.          Vitals Value Taken Time   /59 6/13/2019 12:33 PM   Temp 36.5 °C (97.7 °F) 6/13/2019 12:15 PM   Pulse 64 6/13/2019 12:41 PM   Resp 16 6/13/2019 12:30 PM   SpO2 98 % 6/13/2019 12:41 PM   Vitals shown include unvalidated device data.      No case tracking events are documented in the log.      Pain/Rajendra Score: Rajendra Score: 10 (6/13/2019 12:25 PM)

## 2019-06-13 NOTE — TRANSFER OF CARE
"Anesthesia Transfer of Care Note    Patient: Grace Beltran    Procedure(s) Performed: Procedure(s) (LRB):  COLONOSCOPY - Device-assisted (N/A)    Patient location: PACU    Anesthesia Type: general    Transport from OR: Transported from OR on 6-10 L/min O2 by face mask with adequate spontaneous ventilation    Post pain: adequate analgesia    Post assessment: no apparent anesthetic complications    Post vital signs: stable    Level of consciousness: awake    Nausea/Vomiting: no nausea/vomiting    Complications: none    Transfer of care protocol was followed      Last vitals:   Visit Vitals  /83 (BP Location: Left arm, Patient Position: Sitting)   Pulse 70   Temp 36.7 °C (98.1 °F) (Temporal)   Resp 16   Ht 5' 3.75" (1.619 m)   Wt 65.8 kg (145 lb)   LMP 11/29/2016 (Approximate)   SpO2 96%   Breastfeeding? No   BMI 25.08 kg/m²     "

## 2019-07-03 ENCOUNTER — PATIENT OUTREACH (OUTPATIENT)
Dept: ADMINISTRATIVE | Facility: HOSPITAL | Age: 54
End: 2019-07-03

## 2019-07-03 DIAGNOSIS — Z12.31 ENCOUNTER FOR SCREENING MAMMOGRAM FOR BREAST CANCER: Primary | ICD-10-CM

## 2019-07-03 NOTE — PROGRESS NOTES
Pre-visit chart review completed. Pt eligible/ due for   Mammogram     Lipid Panel      Mammogram and labs scheduled

## 2019-07-10 RX ORDER — CITALOPRAM 40 MG/1
TABLET, FILM COATED ORAL
Qty: 90 TABLET | Refills: 1 | Status: SHIPPED | OUTPATIENT
Start: 2019-07-10 | End: 2020-02-04

## 2019-07-12 ENCOUNTER — OFFICE VISIT (OUTPATIENT)
Dept: URGENT CARE | Facility: CLINIC | Age: 54
End: 2019-07-12
Payer: COMMERCIAL

## 2019-07-12 VITALS
OXYGEN SATURATION: 97 % | RESPIRATION RATE: 16 BRPM | BODY MASS INDEX: 24.75 KG/M2 | SYSTOLIC BLOOD PRESSURE: 126 MMHG | DIASTOLIC BLOOD PRESSURE: 89 MMHG | WEIGHT: 145 LBS | HEIGHT: 64 IN | TEMPERATURE: 99 F | HEART RATE: 80 BPM

## 2019-07-12 DIAGNOSIS — J01.00 ACUTE NON-RECURRENT MAXILLARY SINUSITIS: Primary | ICD-10-CM

## 2019-07-12 DIAGNOSIS — J00 NASOPHARYNGITIS: ICD-10-CM

## 2019-07-12 PROCEDURE — 99214 OFFICE O/P EST MOD 30 MIN: CPT | Mod: 25,S$GLB,, | Performed by: STUDENT IN AN ORGANIZED HEALTH CARE EDUCATION/TRAINING PROGRAM

## 2019-07-12 PROCEDURE — 99214 PR OFFICE/OUTPT VISIT, EST, LEVL IV, 30-39 MIN: ICD-10-PCS | Mod: 25,S$GLB,, | Performed by: STUDENT IN AN ORGANIZED HEALTH CARE EDUCATION/TRAINING PROGRAM

## 2019-07-12 PROCEDURE — 96372 THER/PROPH/DIAG INJ SC/IM: CPT | Mod: S$GLB,,, | Performed by: STUDENT IN AN ORGANIZED HEALTH CARE EDUCATION/TRAINING PROGRAM

## 2019-07-12 PROCEDURE — 3008F PR BODY MASS INDEX (BMI) DOCUMENTED: ICD-10-PCS | Mod: CPTII,S$GLB,, | Performed by: STUDENT IN AN ORGANIZED HEALTH CARE EDUCATION/TRAINING PROGRAM

## 2019-07-12 PROCEDURE — 3008F BODY MASS INDEX DOCD: CPT | Mod: CPTII,S$GLB,, | Performed by: STUDENT IN AN ORGANIZED HEALTH CARE EDUCATION/TRAINING PROGRAM

## 2019-07-12 PROCEDURE — 96372 PR INJECTION,THERAP/PROPH/DIAG2ST, IM OR SUBCUT: ICD-10-PCS | Mod: S$GLB,,, | Performed by: STUDENT IN AN ORGANIZED HEALTH CARE EDUCATION/TRAINING PROGRAM

## 2019-07-12 RX ORDER — BETAMETHASONE SODIUM PHOSPHATE AND BETAMETHASONE ACETATE 3; 3 MG/ML; MG/ML
6 INJECTION, SUSPENSION INTRA-ARTICULAR; INTRALESIONAL; INTRAMUSCULAR; SOFT TISSUE
Status: COMPLETED | OUTPATIENT
Start: 2019-07-12 | End: 2019-07-12

## 2019-07-12 RX ORDER — LORATADINE 10 MG/1
10 TABLET ORAL DAILY
Refills: 0 | COMMUNITY
Start: 2019-07-12 | End: 2019-08-19

## 2019-07-12 RX ORDER — FLUTICASONE PROPIONATE 50 MCG
1 SPRAY, SUSPENSION (ML) NASAL 2 TIMES DAILY
Qty: 9.9 ML | Refills: 0 | Status: SHIPPED | OUTPATIENT
Start: 2019-07-12 | End: 2019-07-26

## 2019-07-12 RX ADMIN — BETAMETHASONE SODIUM PHOSPHATE AND BETAMETHASONE ACETATE 6 MG: 3; 3 INJECTION, SUSPENSION INTRA-ARTICULAR; INTRALESIONAL; INTRAMUSCULAR; SOFT TISSUE at 11:07

## 2019-07-12 NOTE — PROGRESS NOTES
"Subjective:       Patient ID: Grace Beltran is a 53 y.o. female.    Vitals:    07/12/19 1023   BP: 126/89   Pulse: 80   Resp: 16   Temp: 98.5 °F (36.9 °C)   SpO2: 97%   Weight: 65.8 kg (145 lb)   Height: 5' 4" (1.626 m)       Chief Complaint: Sore Throat    Pt states sinus congestion with post nasal drip and sore throat x 3 days.    Sore Throat    This is a new problem. The current episode started in the past 7 days. The problem has been unchanged. Neither side of throat is experiencing more pain than the other. There has been no fever. The pain is at a severity of 3/10. The pain is mild. Associated symptoms include congestion, coughing, headaches, a plugged ear sensation and swollen glands. Pertinent negatives include no abdominal pain, diarrhea, drooling, ear discharge, ear pain, hoarse voice, neck pain, shortness of breath, stridor, trouble swallowing or vomiting. She has had no exposure to strep or mono. Treatments tried: nyquil, dayquil. The treatment provided mild relief.     Review of Systems   Constitution: Positive for chills. Negative for fever and malaise/fatigue.   HENT: Positive for congestion and sore throat. Negative for drooling, ear discharge, ear pain, hoarse voice, stridor and trouble swallowing.    Eyes: Negative for blurred vision, discharge and redness.   Cardiovascular: Negative for chest pain, leg swelling and palpitations.   Respiratory: Positive for cough. Negative for shortness of breath, sputum production and wheezing.    Hematologic/Lymphatic: Negative for adenopathy.   Skin: Negative for itching, rash and suspicious lesions.   Musculoskeletal: Negative for back pain, joint pain, joint swelling, myalgias and neck pain.   Gastrointestinal: Negative for abdominal pain, diarrhea, nausea and vomiting.   Neurological: Positive for headaches. Negative for dizziness, light-headedness and weakness.   Psychiatric/Behavioral: Negative for altered mental status. The patient does not have " "insomnia and is not nervous/anxious.    All other systems reviewed and are negative.      Objective:       Vitals:    07/12/19 1023   BP: 126/89   Pulse: 80   Resp: 16   Temp: 98.5 °F (36.9 °C)   SpO2: 97%   Weight: 65.8 kg (145 lb)   Height: 5' 4" (1.626 m)     Physical Exam   Constitutional: She is oriented to person, place, and time. She appears well-developed and well-nourished. No distress.   HENT:   Head: Normocephalic and atraumatic.   Right Ear: Hearing, tympanic membrane, external ear and ear canal normal.   Left Ear: Hearing, tympanic membrane, external ear and ear canal normal.   Nose: Rhinorrhea present. No mucosal edema. Right sinus exhibits maxillary sinus tenderness. Right sinus exhibits no frontal sinus tenderness. Left sinus exhibits maxillary sinus tenderness. Left sinus exhibits no frontal sinus tenderness.   Mouth/Throat: Uvula is midline and mucous membranes are normal. Posterior oropharyngeal erythema present. No oropharyngeal exudate, posterior oropharyngeal edema or tonsillar abscesses. Tonsils are 1+ on the right. Tonsils are 1+ on the left. No tonsillar exudate.   Eyes: Conjunctivae and EOM are normal. Right eye exhibits no discharge. Left eye exhibits no discharge.   Neck: Normal range of motion. Neck supple.   Cardiovascular: Normal rate, regular rhythm and normal heart sounds. Exam reveals no gallop and no friction rub.   No murmur heard.  Pulmonary/Chest: Effort normal and breath sounds normal. No stridor. She has no wheezes. She has no rales.   Abdominal: Soft. Bowel sounds are normal. She exhibits no distension. There is no tenderness.   Musculoskeletal: She exhibits no edema or tenderness.   Lymphadenopathy:     She has no cervical adenopathy.   Neurological: She is alert and oriented to person, place, and time. No cranial nerve deficit (grossly intact).   Skin: Skin is warm and dry. No rash noted.   Psychiatric: She has a normal mood and affect. Her behavior is normal. Judgment and " thought content normal.   Nursing note and vitals reviewed.      Assessment:       1. Acute non-recurrent maxillary sinusitis    2. Nasopharyngitis        Plan:       Grace was seen today for sore throat.    Diagnoses and all orders for this visit:    Acute non-recurrent maxillary sinusitis  -     betamethasone acetate-betamethasone sodium phosphate injection 6 mg\       - risk of corticosteroids reviewed (elevated BP/glucose, insomnia, psychosis, bone loss, and fat dystrophy at injection site) and patient expressed understanding  -     loratadine (CLARITIN) 10 mg tablet; Take 1 tablet (10 mg total) by mouth once daily. for 14 days  -     fluticasone propionate (FLONASE) 50 mcg/actuation nasal spray; 1 spray (50 mcg total) by Each Nare route 2 (two) times daily. for 14 days    Nasopharyngitis  -     loratadine (CLARITIN) 10 mg tablet; Take 1 tablet (10 mg total) by mouth once daily. for 14 days  -     fluticasone propionate (FLONASE) 50 mcg/actuation nasal spray; 1 spray (50 mcg total) by Each Nare route 2 (two) times daily. for 14 days    Medications and diagnosis reviewed with patient, questions answered, and return precautions given.    Follow up if symptoms worsen or fail to improve.    Jose Sterling MD/MPH  [facility] Medicine  [facility]

## 2019-07-12 NOTE — PATIENT INSTRUCTIONS

## 2019-07-16 ENCOUNTER — OFFICE VISIT (OUTPATIENT)
Dept: PRIMARY CARE CLINIC | Facility: CLINIC | Age: 54
End: 2019-07-16
Payer: COMMERCIAL

## 2019-07-16 ENCOUNTER — TELEPHONE (OUTPATIENT)
Dept: URGENT CARE | Facility: CLINIC | Age: 54
End: 2019-07-16

## 2019-07-16 VITALS
TEMPERATURE: 98 F | HEIGHT: 64 IN | SYSTOLIC BLOOD PRESSURE: 104 MMHG | DIASTOLIC BLOOD PRESSURE: 82 MMHG | HEART RATE: 76 BPM | WEIGHT: 147.94 LBS | BODY MASS INDEX: 25.25 KG/M2

## 2019-07-16 DIAGNOSIS — F41.9 ANXIETY: ICD-10-CM

## 2019-07-16 DIAGNOSIS — M85.80 OSTEOPENIA, UNSPECIFIED LOCATION: ICD-10-CM

## 2019-07-16 DIAGNOSIS — Z00.00 ROUTINE GENERAL MEDICAL EXAMINATION AT A HEALTH CARE FACILITY: Primary | ICD-10-CM

## 2019-07-16 DIAGNOSIS — Z87.81 HISTORY OF PATELLAR FRACTURE: ICD-10-CM

## 2019-07-16 DIAGNOSIS — Z12.31 SCREENING MAMMOGRAM, ENCOUNTER FOR: ICD-10-CM

## 2019-07-16 PROBLEM — Z12.11 COLON CANCER SCREENING: Status: RESOLVED | Noted: 2019-06-13 | Resolved: 2019-07-16

## 2019-07-16 PROBLEM — Z12.11 SCREENING FOR COLON CANCER: Status: RESOLVED | Noted: 2019-04-08 | Resolved: 2019-07-16

## 2019-07-16 PROCEDURE — 99999 PR PBB SHADOW E&M-EST. PATIENT-LVL III: CPT | Mod: PBBFAC,,, | Performed by: FAMILY MEDICINE

## 2019-07-16 PROCEDURE — 99999 PR PBB SHADOW E&M-EST. PATIENT-LVL III: ICD-10-PCS | Mod: PBBFAC,,, | Performed by: FAMILY MEDICINE

## 2019-07-16 PROCEDURE — 99396 PREV VISIT EST AGE 40-64: CPT | Mod: S$GLB,,, | Performed by: FAMILY MEDICINE

## 2019-07-16 PROCEDURE — 99396 PR PREVENTIVE VISIT,EST,40-64: ICD-10-PCS | Mod: S$GLB,,, | Performed by: FAMILY MEDICINE

## 2019-07-16 NOTE — PATIENT INSTRUCTIONS
Due for  Vaccines  - shingles at your pharmacy    Follow with GYN for female health & cancer prevention    Continue medications    ============    OSTEOPENIA - In between normal bone strength and weakened bone strength with osteoporosis.     You are at slightly increased risk for hip fracture, spinal compression fracture in the future, but the experts do not recommend taking a prescription medication at this time    WEIGHT BEARING EXERCISE (carrying light weights) is the BEST way to strengthen the bone where your muscles insert & prevent future fractures.     Focus on 1200 mg of CALCIUM daily  - Which is the equivalent of 3 glasses of milk daily. If you cannot tolerate this, you can substitute yogurt or cheese for one of these servings.  Eat foods rich in calcium.Also you can take TUMS supplements or Viactiv chocolate chews calcium supplement.     Also, 800 units of VITAMIN D daily - This is the equivalent of 10 minutes of direct sunlight daily. If you are not in the sun, consider Foods rich in vitamin D and over the counter  supplement .     Let's repeat this test in 2 years. This is a test that is easily done on the same day as your mammogram    ==============================  RECOMMENDATIONS FOR FEMALES  ==============================  Your #1 MEDICINE is DAILY EXERCISE - 15-20 minutes of huffing & puffing EVERY DAY.     Prevent the #1 cause of death- cardiovascular disease (HEART ATTACK & STROKE) by checking for normal blood pressure, cholesterol, sugars, & by not smoking.     VACCINES: Yearly FLU shot, PNEUMONIA shot after 65,  SHINGLES shot after 50    Screening colonoscopy at AGE  50 & every 10 years to check for COLON CANCER,  one of the most common & preventable cancers (Or FIT kit yearly) Repeat in 3 years if POLYP found     I recommend  high fiber (5 fresh fruits or vegetables daily), low fat diet and aerobic  exercise (huffing/ puffing/ sweating for 20 min straight at least 4 days a week)    Follow up  yearly with mammogram, fasting lipids, CMP, CBC prior.   ==============================================================

## 2019-07-16 NOTE — PROGRESS NOTES
Subjective:      Patient ID: Grace Beltran is a 53 y.o. female.    Chief Complaint: Annual Exam    Here today for general exam.     She would like to check bone density with recent right patella fracture after she slipped on her dog's urine at home.    She is having flare-up of left hip arthritis.  [facility] and [facility] did help while she was there, but pain is recurrent, mainly in the left groin region.    Celexa works well for anxiety and hot flashes.  She would like to continue    Denies any chest pain, shortness of breath, nausea vomiting constipation diarrhea, blood in stool, heartburn      Current Outpatient Medications:     citalopram (CELEXA) 40 MG tablet, TAKE 1 TABLET BY MOUTH ONCE DAILY, Disp: 90 tablet, Rfl: 1    fluticasone propionate (FLONASE) 50 mcg/actuation nasal spray, 1 spray (50 mcg total) by Each Nare route 2 (two) times daily. for 14 days, Disp: 9.9 mL, Rfl: 0    loratadine (CLARITIN) 10 mg tablet, Take 1 tablet (10 mg total) by mouth once daily. for 14 days, Disp: , Rfl: 0    No results found for: HGBA1C  No results found for: MICALBCREAT  Lab Results   Component Value Date    LDLCALC 131.8 06/13/2018    LDLCALC 127.2 05/18/2017    CHOL 220 (H) 06/13/2018    HDL 70 06/13/2018    TRIG 91 06/13/2018       Lab Results   Component Value Date     06/13/2018    K 4.1 06/13/2018     06/13/2018    CO2 28 06/13/2018    GLU 89 06/13/2018    BUN 12 06/13/2018    CREATININE 0.8 06/13/2018    CALCIUM 9.2 06/13/2018    PROT 6.8 06/13/2018    ALBUMIN 3.7 06/13/2018    BILITOT 0.5 06/13/2018    ALKPHOS 96 06/13/2018    AST 20 06/13/2018    ALT 23 06/13/2018    ANIONGAP 8 06/13/2018    ESTGFRAFRICA >60.0 06/13/2018    EGFRNONAA >60.0 06/13/2018    WBC 4.44 06/13/2018    HGB 13.5 06/13/2018    HGB 14.2 05/18/2017    HCT 40.7 06/13/2018    MCV 91 06/13/2018     06/13/2018    TSH 1.077 05/18/2017    HEPCAB Negative 05/18/2017    [id number] 18 (L)  "12/17/2014       Past Medical History:   Diagnosis Date    Acne     Dermatologist prescribes spironolactone     Anxiety     celexa 40    Chronic constipation 5/24/2017    Hot flashes due to menopause 5/24/2017    Osteopenia of spine 05/24/2017    fx patella    Primary osteoarthritis of left hip 05/24/2017    Dr Garay, dry needling at [facility] [place], MRI 2016, Injection didn't help much    Right elbow pain 07/20/2017    dry needling at [facility] [place]     Past Surgical History:   Procedure Laterality Date    COLONOSCOPY N/A 4/8/2019    Performed by [facility]h [facility]ar [facility] at [facility] (4TH [facility])    COLONOSCOPY - Device-assisted N/A 6/13/2019    Performed by Sravan Haji MD at [facility] ENDO (2ND FLR)    INJECTION-JOINT Left 2/2/2015    Performed by Abby Fitzpatrick MD at [facility]    right hip surgery      April 24, 2012     Social History     Social History Narrative     with EMERITA (works with HIV, Hepatitis C), female partner    2 daughters  - one is a competitive cheerleader with [employer]), 1 son (Yves)    Nonsmoker, ETOH 4 x / week    GYN [facility], never had colonoscopy     Family History   Problem Relation Age of Onset    Hypertension Mother     Hyperlipidemia Mother     Osteoporosis Mother     Breast cancer Maternal Aunt 65     Vitals:    07/16/19 1301   BP: 104/82   Pulse: 76   Temp: 98.1 °F (36.7 °C)   Weight: 67.1 kg (147 lb 14.9 oz)   Height: 5' 4" (1.626 m)   PainSc:   5     Objective:   Physical Exam   Constitutional: She is oriented to person, place, and time. She appears well-developed and well-nourished.   HENT:   Head: Normocephalic and atraumatic.   Right Ear: External ear normal.   Left Ear: External ear normal.   Nose: Nose normal.   Mouth/Throat: Oropharynx is clear and moist.   Eyes: Pupils are equal, round, and reactive to light. EOM are normal.   Neck: Neck supple. No thyromegaly present.   Cardiovascular: Normal rate, regular rhythm, normal heart sounds " and intact distal pulses.   No murmur heard.  Pulmonary/Chest: Effort normal and breath sounds normal. She has no wheezes.   Abdominal: Soft. Bowel sounds are normal. She exhibits no distension and no mass. There is no tenderness. There is no rebound and no guarding.   Musculoskeletal: She exhibits no edema.   Normal gait, discomfort with limited rotation of the left hip joint   Lymphadenopathy:     She has no cervical adenopathy.   Neurological: She is alert and oriented to person, place, and time.   Skin: Skin is warm and dry.   Psychiatric: She has a normal mood and affect. Her behavior is normal.     Assessment:     1. Routine general medical examination at a health care facility    2. Anxiety    3. Osteopenia, unspecified location    4. Screening mammogram, encounter for      Plan:     Orders Placed This Encounter    DXA Bone Density Spine And Hip    Mammo Digital Screening Bilat w/ Iraj    CBC auto differential    Comprehensive metabolic panel    Lipid panel       Patient Instructions   Due for  Vaccines  - shingles at your pharmacy    Follow with GYN for female health & cancer prevention    Continue medications    ============    OSTEOPENIA - In between normal bone strength and weakened bone strength with osteoporosis.     You are at slightly increased risk for hip fracture, spinal compression fracture in the future, but the experts do not recommend taking a prescription medication at this time    WEIGHT BEARING EXERCISE (carrying light weights) is the BEST way to strengthen the bone where your muscles insert & prevent future fractures.     Focus on 1200 mg of CALCIUM daily  - Which is the equivalent of 3 glasses of milk daily. If you cannot tolerate this, you can substitute yogurt or cheese for one of these servings.  Eat foods rich in calcium.Also you can take TUMS supplements or Viactiv chocolate chews calcium supplement.     Also, 800 units of VITAMIN D daily - This is the equivalent of 10 minutes of  direct sunlight daily. If you are not in the sun, consider Foods rich in vitamin D and over the counter  supplement .     Let's repeat this test in 2 years. This is a test that is easily done on the same day as your mammogram    ==============================  RECOMMENDATIONS FOR FEMALES  ==============================  Your #1 MEDICINE is DAILY EXERCISE - 15-20 minutes of huffing & puffing EVERY DAY.     Prevent the #1 cause of death- cardiovascular disease (HEART ATTACK & STROKE) by checking for normal blood pressure, cholesterol, sugars, & by not smoking.     VACCINES: Yearly FLU shot, PNEUMONIA shot after 65,  SHINGLES shot after 50    Screening colonoscopy at AGE  50 & every 10 years to check for COLON CANCER,  one of the most common & preventable cancers (Or FIT kit yearly) Repeat in 3 years if POLYP found     I recommend  high fiber (5 fresh fruits or vegetables daily), low fat diet and aerobic  exercise (huffing/ puffing/ sweating for 20 min straight at least 4 days a week)    Follow up yearly with mammogram, fasting lipids, CMP, CBC prior.   ==============================================================

## 2019-07-17 PROBLEM — Z87.81 HISTORY OF PATELLAR FRACTURE: Status: ACTIVE | Noted: 2019-07-17

## 2019-08-02 ENCOUNTER — HOSPITAL ENCOUNTER (OUTPATIENT)
Dept: RADIOLOGY | Facility: HOSPITAL | Age: 54
Discharge: HOME | End: 2019-08-02
Attending: FAMILY MEDICINE
Payer: COMMERCIAL

## 2019-08-02 ENCOUNTER — APPOINTMENT (OUTPATIENT)
Dept: RADIOLOGY | Facility: CLINIC | Age: 54
End: 2019-08-02
Attending: FAMILY MEDICINE
Payer: COMMERCIAL

## 2019-08-02 DIAGNOSIS — M85.80 OSTEOPENIA, UNSPECIFIED LOCATION: ICD-10-CM

## 2019-08-02 DIAGNOSIS — Z12.31 ENCOUNTER FOR SCREENING MAMMOGRAM FOR BREAST CANCER: ICD-10-CM

## 2019-08-02 PROCEDURE — 77080 DXA BONE DENSITY AXIAL: CPT | Mod: TC,PO

## 2019-08-02 PROCEDURE — 77080 DEXA BONE DENSITY SPINE HIP: ICD-10-PCS | Mod: 26,,, | Performed by: INTERNAL MEDICINE

## 2019-08-02 PROCEDURE — 77080 DXA BONE DENSITY AXIAL: CPT | Mod: 26,,, | Performed by: INTERNAL MEDICINE

## 2019-08-02 PROCEDURE — 77067 SCR MAMMO BI INCL CAD: CPT | Mod: 26,,, | Performed by: RADIOLOGY

## 2019-08-02 PROCEDURE — 77063 MAMMO DIGITAL SCREENING BILAT WITH TOMOSYNTHESIS_CAD: ICD-10-PCS | Mod: 26,,, | Performed by: RADIOLOGY

## 2019-08-02 PROCEDURE — 77067 SCR MAMMO BI INCL CAD: CPT | Mod: TC,PO

## 2019-08-02 PROCEDURE — 77067 MAMMO DIGITAL SCREENING BILAT WITH TOMOSYNTHESIS_CAD: ICD-10-PCS | Mod: 26,,, | Performed by: RADIOLOGY

## 2019-08-02 PROCEDURE — 77063 BREAST TOMOSYNTHESIS BI: CPT | Mod: 26,,, | Performed by: RADIOLOGY

## 2019-08-08 PROBLEM — M81.8 OTHER OSTEOPOROSIS WITHOUT CURRENT PATHOLOGICAL FRACTURE: Status: ACTIVE | Noted: 2017-05-24

## 2019-08-09 ENCOUNTER — PATIENT MESSAGE (OUTPATIENT)
Dept: PRIMARY CARE CLINIC | Facility: CLINIC | Age: 54
End: 2019-08-09

## 2019-08-09 DIAGNOSIS — Z87.81 HISTORY OF ANKLE FRACTURE: ICD-10-CM

## 2019-08-09 DIAGNOSIS — M81.8 OTHER OSTEOPOROSIS WITHOUT CURRENT PATHOLOGICAL FRACTURE: ICD-10-CM

## 2019-08-09 DIAGNOSIS — Z87.81 HISTORY OF PATELLAR FRACTURE: Primary | ICD-10-CM

## 2019-08-10 NOTE — PROGRESS NOTES
Prince Mendoza. As we discussed, Your Bone density test has changed from osteopenia to OSTEOPOROSIS. With you history of ankle fracture & patella fracture, with your young age & your mom with Osteoporosis, there may be several treatment options to help prevent fractures in your future.     You are at increased risk for hip fracture, spinal compression fracture in the future    WEIGHT BEARING EXERCISE (carrying light weights) is the BEST way to strengthen the bone where your muscles insert & prevent future fractures.     Focus on 1200 mg of CALCIUM daily  - Which is the equivalent of 3 glasses of milk daily. If you cannot tolerate this, you can substitute yogurt or cheese for one of these servings.  Eat foods rich in calcium.Also you can take TUMS supplements or Viactiv chocolate chews calcium supplement.     Also, 800 units of VITAMIN D daily - This is the equivalent of 10 minutes of direct sunlight daily. If you are not in the sun, consider Foods rich in vitamin D and over the counter  supplement .     Please call our referral coordinator Brittney Santacruz ( [phone]) to set up your specialist appointment.

## 2019-08-16 NOTE — PROGRESS NOTES
Subjective:      Patient ID: Grace Beltran is a 53 y.o. female.    Chief Complaint:  Osteoporosis      History of Present Illness: Grace Beltran is a 53 y.o. female present for first time appointment for osteoporosis.     Osteoporosis  Patient complains of osteoporosis. She was diagnosed with osteoporosis by bone density scan in 7/2019. Patient admits to history of fracture of R patella after a fall in 10/2018, R ankle fracture after fall in 2016.    Family hx of Osteoporosis, mother got diagnosed at 52, now 77.  Grandmother also had osteoporosis. Currently taking Tums dailys, not taking supplement like Vit D at this time. Not on any other treatment    Health wise is stable  Menopause, ~52 yo of age. Not on estrogen therapy  Follow up with dentist, no jaw/dental issue    R hip surgery to fix labral tear in 2012    No acid reflux  No renal stone  No hematologic cancer: no Multiple myeloma hx  Want better alternative fosamax. As she suspect poor compliance    Osteoporosis Risk Factors   Nonmodifiable  Personal Hx of fracture as an adult: yes    Hx of fracture in first-degree relative: yes - mom, shoulder fracture, at 76   race: yes  Advanced age: no  Female sex: yes  Dementia: no  Poor health/frailty: no     Potentially modifiable:  Tobacco use: no  Low body weight (<127 lbs): no  Estrogen deficiency: Yes, Menopause, ~52 yo of age  Low calcium intake (lifelong): no, heavy milk drinker, still with dairy product  Alcoholism: no, one beer every 3 days  Recurrent falls: no  Inadequate physical activity: no    Current calcium and Vit D intake:  Dietary sources: Drinks milk and daily product  Supplements: Tums    DXA 7/16/19  Lumbar Spine: Lumbar bone mineral density L1-L4 is 0.742g/cm2, which is a T-score of -2.8. The Z-score is -1.7.  Total Hip: Total hip bone mineral density is 0.791g/cm2.  The T-score is -1.2, and the Z-score is -0.6.  Femoral neck: Bone mineral density is 0.733g/cm2 and the T-score  "is -1.0 and the Z-score is 0.0 g/cm2.  There is a 10 % risk of a major osteoporotic fracture and a 0.6% risk of hip fracture in the next 10 years (FRAX).    Review of Systems   Constitutional: Negative for activity change.   HENT: Negative for congestion.    Eyes: Negative for visual disturbance.   Respiratory: Negative for shortness of breath.    Cardiovascular: Negative for chest pain.   Gastrointestinal: Negative for abdominal pain.   Genitourinary: Negative for urgency.   Musculoskeletal: Negative for arthralgias.   Skin: Negative for wound.   Neurological: Negative for weakness.   Psychiatric/Behavioral: Negative for confusion.       Objective:   Physical Exam   Constitutional: She is oriented to person, place, and time. She appears well-developed. No distress.   HENT:   Head: Normocephalic and atraumatic.   Right Ear: External ear normal.   Left Ear: External ear normal.   Nose: Nose normal.   Eyes: Conjunctivae are normal. No scleral icterus.   Neck: No tracheal deviation present. No thyromegaly present.   Cardiovascular: Normal rate and normal heart sounds.   No murmur heard.  Pulmonary/Chest: Effort normal and breath sounds normal.   Abdominal: Soft. She exhibits no mass. There is no tenderness.   Musculoskeletal: Normal range of motion. She exhibits no edema.   Neurological: She is alert and oriented to person, place, and time. No sensory deficit. Coordination normal.   Skin: Skin is warm. No rash noted.   Psychiatric: She has a normal mood and affect. Judgment normal.   Nursing note and vitals reviewed.    /78   Pulse 82   Resp 20   Ht 5' 4" (1.626 m)   Wt 68.1 kg (150 lb 0.4 oz)   LMP 11/29/2016 (Approximate)   BMI 25.75 kg/m²     Body mass index is 25.75 kg/m².    Lab Review:   No results found for: HGBA1C  Lab Results   Component Value Date    CHOL 222 (H) 08/02/2019    HDL 76 (H) 08/02/2019    LDLCALC 121.8 08/02/2019    TRIG 121 08/02/2019    CHOLHDL 34.2 08/02/2019     Lab Results "   Component Value Date     08/02/2019    K 4.2 08/02/2019     08/02/2019    CO2 28 08/02/2019    GLU 82 08/02/2019    BUN 13 08/02/2019    CREATININE 0.7 08/02/2019    CALCIUM 9.7 08/02/2019    PROT 6.8 08/02/2019    ALBUMIN 3.6 08/02/2019    BILITOT 0.4 08/02/2019    ALKPHOS 101 08/02/2019    AST 26 08/02/2019    ALT 39 08/02/2019    ANIONGAP 9 08/02/2019    ESTGFRAFRICA >60.0 08/02/2019    EGFRNONAA >60.0 08/02/2019    TSH 1.077 05/18/2017     Vit D, 25-Hydroxy   Date Value Ref Range Status   12/17/2014 18 (L) 30 - 96 ng/mL Final     Comment:     Vitamin D deficiency.........<10 ng/mL                              Vitamin D insufficiency......10-29 ng/mL       Vitamin D sufficiency........> or equal to 30 ng/mL  Vitamin D toxicity............>100 ng/mL           Assessment and Plan     Other osteoporosis without current pathological fracture  - noted on recent DXA, T-score of -2.8 on spine, WNL for femoral neck and hip  - given strong maternal family hx of mother and grandmother with osteoporosis, pt is menopausal, suspect genetic as the cause of OP  - will work up other causes with lab work, TSH, PTH, Vit D, Urine Ca/Cr and renal function panel  - pt request alternative for fosamax, advised her on Zoledronic Acid IV yearly, for which she is interested in  - discuss Side effect of ZA she is aware, info given on print out  - plan for 3 year with holiday after  - repeat DXA in 8/2021  - continue Vit D and Calcium supplement as below      Vitamin D deficiency  - noted since lab work in 2014, not on vit D supplement  - advised that she start 2000IU of Vit D3 daily  - repeat vit D lab, may need stronger dose, pending level  - continue tums      RTC in 1 year    Case discussed with  Dr. Bolton  Recommendations were discussed with the patient in detail  The patient verbalized understanding and agrees with the plan outlined as above          Thuan Blas MD   Endocrinology Fellow   8/19/2019 1:09 PM

## 2019-08-19 ENCOUNTER — OFFICE VISIT (OUTPATIENT)
Dept: ENDOCRINOLOGY | Facility: CLINIC | Age: 54
End: 2019-08-19
Payer: COMMERCIAL

## 2019-08-19 VITALS
DIASTOLIC BLOOD PRESSURE: 78 MMHG | HEART RATE: 82 BPM | RESPIRATION RATE: 20 BRPM | WEIGHT: 150 LBS | BODY MASS INDEX: 25.61 KG/M2 | SYSTOLIC BLOOD PRESSURE: 106 MMHG | HEIGHT: 64 IN

## 2019-08-19 DIAGNOSIS — M81.8 OTHER OSTEOPOROSIS WITHOUT CURRENT PATHOLOGICAL FRACTURE: Primary | ICD-10-CM

## 2019-08-19 DIAGNOSIS — E55.9 VITAMIN D DEFICIENCY: ICD-10-CM

## 2019-08-19 PROCEDURE — 99999 PR PBB SHADOW E&M-EST. PATIENT-LVL III: ICD-10-PCS | Mod: PBBFAC,,, | Performed by: HOSPITALIST

## 2019-08-19 PROCEDURE — 99204 OFFICE O/P NEW MOD 45 MIN: CPT | Mod: S$GLB,,, | Performed by: HOSPITALIST

## 2019-08-19 PROCEDURE — 99204 PR OFFICE/OUTPT VISIT, NEW, LEVL IV, 45-59 MIN: ICD-10-PCS | Mod: S$GLB,,, | Performed by: HOSPITALIST

## 2019-08-19 PROCEDURE — 99999 PR PBB SHADOW E&M-EST. PATIENT-LVL III: CPT | Mod: PBBFAC,,, | Performed by: HOSPITALIST

## 2019-08-19 RX ORDER — HEPARIN 100 UNIT/ML
500 SYRINGE INTRAVENOUS
Status: CANCELLED | OUTPATIENT
Start: 2019-08-19

## 2019-08-19 RX ORDER — SODIUM CHLORIDE 0.9 % (FLUSH) 0.9 %
10 SYRINGE (ML) INJECTION
Status: CANCELLED | OUTPATIENT
Start: 2019-08-19

## 2019-08-19 RX ORDER — ACETAMINOPHEN 500 MG
500 TABLET ORAL
Status: CANCELLED | OUTPATIENT
Start: 2019-08-19

## 2019-08-19 RX ORDER — VIT C/E/ZN/COPPR/LUTEIN/ZEAXAN 250MG-90MG
2000 CAPSULE ORAL DAILY
Refills: 0
Start: 2019-08-19 | End: 2020-08-13

## 2019-08-19 RX ORDER — ZOLEDRONIC ACID 5 MG/100ML
5 INJECTION, SOLUTION INTRAVENOUS
Status: CANCELLED | OUTPATIENT
Start: 2019-08-19

## 2019-08-19 NOTE — PATIENT INSTRUCTIONS
VitD3 Over the counter, 2000 IU daily        Zoledronic Acid injection   (Osteoporosis)  What is this medicine?  ZOLEDRONIC ACID (ARACELY le dron ik AS id) lowers the amount of calcium loss from bone. It is used to treat Paget's disease and osteoporosis in women.  How should I use this medicine?  This medicine is for infusion into a vein. It is given by a health care professional in a hospital or clinic setting.  Talk to your pediatrician regarding the use of this medicine in children. This medicine is not approved for use in children.  What side effects may I notice from receiving this medicine?  Side effects that you should report to your doctor or health care professional as soon as possible:  · allergic reactions like skin rash, itching or hives, swelling of the face, lips, or tongue  · anxiety, confusion, or depression  · breathing problems  · changes in vision  · eye pain  · feeling faint or lightheaded, falls  · jaw pain, especially after dental work  · mouth sores  · muscle cramps, stiffness, or weakness  · redness, blistering, peeling or loosening of the skin, including inside the mouth  · trouble passing urine or change in the amount of urine  Side effects that usually do not require medical attention (report to your doctor or health care professional if they continue or are bothersome):  · bone, joint, or muscle pain  · constipation  · diarrhea  · fever  · hair loss  · irritation at site where injected  · loss of appetite  · nausea, vomiting  · stomach upset  · trouble sleeping  · trouble swallowing  · weak or tired  What may interact with this medicine?  · certain antibiotics given by injection  · NSAIDs, medicines for pain and inflammation, like ibuprofen or naproxen  · some diuretics like bumetanide, furosemide  · teriparatide  What if I miss a dose?  It is important not to miss your dose. Call your doctor or health care professional if you are unable to keep an appointment.  Where should I keep my  medicine?  This drug is given in a hospital or clinic and will not be stored at home.  What should I tell my health care provider before I take this medicine?  They need to know if you have any of these conditions:  · aspirin-sensitive asthma  · cancer, especially if you are receiving medicines used to treat cancer  · dental disease or wear dentures  · infection  · kidney disease  · low levels of calcium in the blood  · past surgery on the parathyroid gland or intestines  · receiving corticosteroids like dexamethasone or prednisone  · an unusual or allergic reaction to zoledronic acid, other medicines, foods, dyes, or preservatives  · pregnant or trying to get pregnant  · breast-feeding  What should I watch for while using this medicine?  Visit your doctor or health care professional for regular checkups. It may be some time before you see the benefit from this medicine. Do not stop taking your medicine unless your doctor tells you to. Your doctor may order blood tests or other tests to see how you are doing.  Women should inform their doctor if they wish to become pregnant or think they might be pregnant. There is a potential for serious side effects to an unborn child. Talk to your health care professional or pharmacist for more information.  You should make sure that you get enough calcium and vitamin D while you are taking this medicine. Discuss the foods you eat and the vitamins you take with your health care professional.  Some people who take this medicine have severe bone, joint, and/or muscle pain. This medicine may also increase your risk for jaw problems or a broken thigh bone. Tell your doctor right away if you have severe pain in your jaw, bones, joints, or muscles. Tell your doctor if you have any pain that does not go away or that gets worse.  Tell your dentist and dental surgeon that you are taking this medicine. You should not have major dental surgery while on this medicine. See your dentist to  have a dental exam and fix any dental problems before starting this medicine. Take good care of your teeth while on this medicine. Make sure you see your dentist for regular follow-up appointments.  NOTE:This sheet is a summary. It may not cover all possible information. If you have questions about this medicine, talk to your doctor, pharmacist, or health care provider. Copyright© 2017 [employer].

## 2019-08-19 NOTE — PROGRESS NOTES
I have reviewed and concur with Dr. Blas's history, physical, assessment, and plan.  I have personally interviewed and examined the patient.    Osteoporosis of lumbar spine. Risk factors include race, postmenopausal, family history  Will perform secondary evaluation to rule out contributing causes then plan to treat with Reclast  Side effects incl post-infusion myalgias, ONJ and association with subtrochanteric fractures reviewed.  Advise to see the dentist regularly, notify dentist of using this medication and avoid non-emergent dental implants and extractions.  Also advise to contact us if develops new, persistent thigh or groin pain.      Deanna Bolton MD

## 2019-08-19 NOTE — ASSESSMENT & PLAN NOTE
- noted on recent DXA, T-score of -2.8 on spine, WNL for femoral neck and hip  - given strong maternal family hx of mother and grandmother with osteoporosis, pt is menopausal, suspect genetic as the cause of OP  - will work up other causes with lab work, TSH, PTH, Vit D, Urine Ca/Cr and renal function panel  - pt request alternative for fosamax, advised her on Zoledronic Acid IV yearly, for which she is interested in  - discuss Side effect of ZA she is aware, info given on print out  - plan for 3 year with holiday  - repeat DXA in 2021  - continue Vit D and Calcium supplement as below

## 2019-08-19 NOTE — ASSESSMENT & PLAN NOTE
- noted since lab work in 2014, not on vit D supplement  - advised that she start 2000IU of Vit D3 daily  - repeat vit D lab, may need stronger dose, pending level  - continue tums

## 2019-08-26 ENCOUNTER — LAB VISIT (OUTPATIENT)
Dept: LAB | Facility: HOSPITAL | Age: 54
End: 2019-08-26
Attending: HOSPITALIST
Payer: COMMERCIAL

## 2019-08-26 DIAGNOSIS — M81.8 OTHER OSTEOPOROSIS WITHOUT CURRENT PATHOLOGICAL FRACTURE: ICD-10-CM

## 2019-08-26 DIAGNOSIS — E55.9 VITAMIN D DEFICIENCY: ICD-10-CM

## 2019-08-26 LAB
CALCIUM 24H UR-MRATE: 11 MG/HR (ref 4–12)
CALCIUM UR-MCNC: 16.1 MG/DL (ref 0–15)
CALCIUM URINE (MG/SPEC): 258 MG/SPEC
URINE COLLECTION DURATION: 24 HR
URINE VOLUME: 1600 ML

## 2019-08-26 PROCEDURE — 82340 ASSAY OF CALCIUM IN URINE: CPT

## 2019-08-28 ENCOUNTER — TELEPHONE (OUTPATIENT)
Dept: ENDOCRINOLOGY | Facility: CLINIC | Age: 54
End: 2019-08-28

## 2019-08-28 ENCOUNTER — PATIENT MESSAGE (OUTPATIENT)
Dept: ENDOCRINOLOGY | Facility: CLINIC | Age: 54
End: 2019-08-28

## 2019-08-28 DIAGNOSIS — M81.8 OTHER OSTEOPOROSIS WITHOUT CURRENT PATHOLOGICAL FRACTURE: ICD-10-CM

## 2019-08-28 DIAGNOSIS — E55.9 VITAMIN D DEFICIENCY: Primary | ICD-10-CM

## 2019-08-28 RX ORDER — ERGOCALCIFEROL 1.25 MG/1
50000 CAPSULE ORAL
Qty: 4 CAPSULE | Refills: 3 | Status: SHIPPED | OUTPATIENT
Start: 2019-08-28 | End: 2020-01-31

## 2019-10-08 ENCOUNTER — PATIENT MESSAGE (OUTPATIENT)
Dept: ENDOCRINOLOGY | Facility: CLINIC | Age: 54
End: 2019-10-08

## 2019-10-23 ENCOUNTER — INFUSION (OUTPATIENT)
Dept: INFECTIOUS DISEASES | Facility: HOSPITAL | Age: 54
End: 2019-10-23
Attending: INTERNAL MEDICINE
Payer: COMMERCIAL

## 2019-10-23 VITALS
BODY MASS INDEX: 26.16 KG/M2 | TEMPERATURE: 98 F | DIASTOLIC BLOOD PRESSURE: 71 MMHG | HEIGHT: 64 IN | HEART RATE: 71 BPM | SYSTOLIC BLOOD PRESSURE: 126 MMHG | WEIGHT: 153.25 LBS

## 2019-10-23 DIAGNOSIS — M81.8 OTHER OSTEOPOROSIS WITHOUT CURRENT PATHOLOGICAL FRACTURE: Primary | ICD-10-CM

## 2019-10-23 PROCEDURE — 25000003 PHARM REV CODE 250: Performed by: HOSPITALIST

## 2019-10-23 PROCEDURE — 96365 THER/PROPH/DIAG IV INF INIT: CPT

## 2019-10-23 PROCEDURE — 63600175 PHARM REV CODE 636 W HCPCS: Performed by: HOSPITALIST

## 2019-10-23 RX ORDER — ACETAMINOPHEN 500 MG
500 TABLET ORAL
Status: CANCELLED | OUTPATIENT
Start: 2019-10-23

## 2019-10-23 RX ORDER — ZOLEDRONIC ACID 5 MG/100ML
5 INJECTION, SOLUTION INTRAVENOUS
Status: COMPLETED | OUTPATIENT
Start: 2019-10-23 | End: 2019-10-23

## 2019-10-23 RX ORDER — HEPARIN 100 UNIT/ML
500 SYRINGE INTRAVENOUS
Status: CANCELLED | OUTPATIENT
Start: 2019-10-23

## 2019-10-23 RX ORDER — ZOLEDRONIC ACID 5 MG/100ML
5 INJECTION, SOLUTION INTRAVENOUS
Status: CANCELLED | OUTPATIENT
Start: 2019-10-23

## 2019-10-23 RX ORDER — ACETAMINOPHEN 500 MG
500 TABLET ORAL
Status: DISCONTINUED | OUTPATIENT
Start: 2019-10-23 | End: 2019-10-23 | Stop reason: HOSPADM

## 2019-10-23 RX ORDER — SODIUM CHLORIDE 0.9 % (FLUSH) 0.9 %
10 SYRINGE (ML) INJECTION
Status: CANCELLED | OUTPATIENT
Start: 2019-10-23

## 2019-10-23 RX ORDER — SODIUM CHLORIDE 0.9 % (FLUSH) 0.9 %
10 SYRINGE (ML) INJECTION
Status: DISCONTINUED | OUTPATIENT
Start: 2019-10-23 | End: 2019-10-23 | Stop reason: HOSPADM

## 2019-10-23 RX ORDER — HEPARIN 100 UNIT/ML
500 SYRINGE INTRAVENOUS
Status: DISCONTINUED | OUTPATIENT
Start: 2019-10-23 | End: 2019-10-23 | Stop reason: HOSPADM

## 2019-10-23 RX ADMIN — ACETAMINOPHEN 500 MG: 500 TABLET ORAL at 09:10

## 2019-10-23 RX ADMIN — ZOLEDRONIC ACID 5 MG: 5 INJECTION, SOLUTION INTRAVENOUS at 09:10

## 2019-10-23 NOTE — PROGRESS NOTES
Pt arrived to infusion suite for first dose reclast infusion.  Premed of tylenol 500mg administered PO.  Then reclast infusion intiated 30 minutes later.  Pt tolerated infusion well and left in NAD.  Advised pt to increase fluid intake over the next couple of days.

## 2020-01-15 ENCOUNTER — OFFICE VISIT (OUTPATIENT)
Dept: SPORTS MEDICINE | Facility: CLINIC | Age: 55
End: 2020-01-15
Payer: COMMERCIAL

## 2020-01-15 ENCOUNTER — HOSPITAL ENCOUNTER (OUTPATIENT)
Dept: RADIOLOGY | Facility: HOSPITAL | Age: 55
Discharge: HOME | End: 2020-01-15
Attending: ORTHOPAEDIC SURGERY
Payer: COMMERCIAL

## 2020-01-15 VITALS
SYSTOLIC BLOOD PRESSURE: 107 MMHG | HEART RATE: 89 BPM | DIASTOLIC BLOOD PRESSURE: 76 MMHG | HEIGHT: 64 IN | BODY MASS INDEX: 26.12 KG/M2 | WEIGHT: 153 LBS

## 2020-01-15 DIAGNOSIS — M25.551 RIGHT HIP PAIN: ICD-10-CM

## 2020-01-15 DIAGNOSIS — M25.551 RIGHT HIP PAIN: Primary | ICD-10-CM

## 2020-01-15 PROCEDURE — 99214 OFFICE O/P EST MOD 30 MIN: CPT | Mod: S$GLB,,, | Performed by: ORTHOPAEDIC SURGERY

## 2020-01-15 PROCEDURE — 73503 XR HIP 4 OR MORE VIEWS RIGHT: ICD-10-PCS | Mod: 26,LT,, | Performed by: RADIOLOGY

## 2020-01-15 PROCEDURE — 3008F BODY MASS INDEX DOCD: CPT | Mod: CPTII,S$GLB,, | Performed by: ORTHOPAEDIC SURGERY

## 2020-01-15 PROCEDURE — 99999 PR PBB SHADOW E&M-EST. PATIENT-LVL III: ICD-10-PCS | Mod: PBBFAC,,, | Performed by: ORTHOPAEDIC SURGERY

## 2020-01-15 PROCEDURE — 99999 PR PBB SHADOW E&M-EST. PATIENT-LVL III: CPT | Mod: PBBFAC,,, | Performed by: ORTHOPAEDIC SURGERY

## 2020-01-15 PROCEDURE — 73503 X-RAY EXAM HIP UNI 4/> VIEWS: CPT | Mod: 26,LT,, | Performed by: RADIOLOGY

## 2020-01-15 PROCEDURE — 99214 PR OFFICE/OUTPT VISIT, EST, LEVL IV, 30-39 MIN: ICD-10-PCS | Mod: S$GLB,,, | Performed by: ORTHOPAEDIC SURGERY

## 2020-01-15 PROCEDURE — 3008F PR BODY MASS INDEX (BMI) DOCUMENTED: ICD-10-PCS | Mod: CPTII,S$GLB,, | Performed by: ORTHOPAEDIC SURGERY

## 2020-01-15 PROCEDURE — 73503 X-RAY EXAM HIP UNI 4/> VIEWS: CPT | Mod: TC,LT

## 2020-01-15 NOTE — PROGRESS NOTES
"CC right hip pain     55 y/o s/p hip scope 4-24-12.      PROCEDURE PERFORMED:   1. Right hip arthroscopic labral repair.   2. Right hip arthroscopic femoroplasty.   3. Right hip arthroscopic psoas tenotomy.   4. Right hip arthroscopic partial synovectomy/debridement.   5. Right hip chondroplasty.      Doing "great" with the right hip.       LEFT HIP:  The patient has been having left hip symptoms for about 1 year and can only associated the timing with a right ankle injury that caused her to be full WB on the left and non on the right. The symptoms began then and have not subsided.  She was seen in clinic in 3/2014 at which time she was referred to PT and for a hip injection.  She did not attempt therapy, and did not undergo the injection however.  Her symptoms have persisted. She has pain with sitting, first standing and with sleeping. There is also clicking that occurs in the left hip.     Review of Systems   Constitution: Negative. Negative for chills, fever and night sweats.   HENT: Negative for congestion and headaches.    Eyes: Negative for blurred vision, left vision loss and right vision loss.   Cardiovascular: Negative for chest pain and syncope.   Respiratory: Negative for cough and shortness of breath.    Endocrine: Negative for polydipsia, polyphagia and polyuria.   Hematologic/Lymphatic: Negative for bleeding problem. Does not bruise/bleed easily.   Skin: Negative for dry skin, itching and rash.   Musculoskeletal: Negative for falls and muscle weakness.   Gastrointestinal: Negative for abdominal pain and bowel incontinence.   Genitourinary: Negative for bladder incontinence and nocturia.   Neurological: Negative for disturbances in coordination, loss of balance and seizures.   Psychiatric/Behavioral: Negative for depression. The patient does not have insomnia.    Allergic/Immunologic: Negative for hives and persistent infections.         PAST MEDICAL HISTORY:         Past Medical History   Diagnosis " "Date    Acne         Dermatologist prescribes spironolactone     Anxiety         celexa 40    Left hip pain         injection by Dr Fitzpatrick helped, Dr Lackey      PAST SURGICAL HISTORY:          Past Surgical History   Procedure Laterality Date    Right hip surgery           April 24, 2012      FAMILY HISTORY:         Family History   Problem Relation Age of Onset    Hypertension Mother      Hyperlipidemia Mother      Breast cancer Maternal Aunt 65      SOCIAL HISTORY:   Social History            Social History    Marital status: Single       Spouse name: N/A    Number of children: N/A    Years of education: N/A          Occupational History    Not on file.              Social History Main Topics    Smoking status: Never Smoker    Smokeless tobacco: Never Used    Alcohol use 2.4 oz/week        4 Standard drinks or equivalent per week          Comment: 4 beers a week    Drug use: No    Sexual activity: Yes       Partners: Female           Other Topics Concern    Not on file          Social History Narrative      with EMERITA (works with [employer]), female partner     2 daughters (Faby, 1 son (Yves)     Nonsmoker, ETOH 4 x / week     GYN [facility], never had colonoscopy         MEDICATIONS:   Current Outpatient Prescriptions:     citalopram (CELEXA) 40 MG tablet, Take 1 tablet (40 mg total) by mouth once daily., Disp: 90 tablet, Rfl: 3    spironolactone (ALDACTONE) 50 MG tablet, Take 50 mg by mouth once daily. , Disp: , Rfl: 0  ALLERGIES:         Allergies   Allergen Reactions    Codeine         Other reaction(s): Nausea    Hydrocodone         Other reaction(s): Vomiting  Other reaction(s): Nausea    Promethazine         Other reaction(s): Vomiting  Other reaction(s): Nausea         VITAL SIGNS:        Visit Vitals    Ht 5' 3" (1.6 m)    Wt 60.8 kg (134 lb)    BMI 23.74 kg/m2            PHYSICAL EXAM / Right/LEFT HIP  PHYSICAL EXAMINATION  General:  The patient is alert and oriented x 3.  Mood " is pleasant.  Observation of ears, eyes and nose reveal no gross abnormalities.  No labored breathing observed.     Right HIP EXAMINATION      OBSERVATION / INSPECTION  Gait:                             Nonantalgic   Alignment:                   Neutral   Scars:                          Normally healing  Muscle atrophy:          None   Effusion:                      None   Warmth:                      None   Discoloration:              None   Leg lengths:                Equal   Pelvis:              Level      TENDERNESS / CREPITUS (T/C):                                                                                                    Right T / C                              Left T / C  Trochanteric bursa                  -/ -                                            -/ -  Piriformis                                 - / -                                           -/ -  SI joint                                     - / -                                           -/ -  Psoas tendon                          - / -                                           -/ -  Rectus insertion                      - / -                                           -/ -  Adductor insertion                   - / -                                           -/ -  Pubic symphysis                     - / -                                           -/ -     ROM:   (* = pain)    RIGHT/LEFT  Flexion:                       120 degrees/105 *  External rotation:         45 degrees/45  Internal rotation:          40 degrees without load, 30 degrees with load/15*  Abduction:                   60 degrees/65 *  Adduction:                   40 degrees/20     SPECIAL TESTS:    RIGHT/LEFT  Pain w/ forced internal rotation (FADIR):       - /POSITIVE  Pain w/ forced external rotation (DEANDRE):     Absent/POSITIVE  Circumduction test:                                         -/  Stinchfield test:                                               Negative /  POSITIVE  Log roll:                                                           Negative /Negative  Snapping hip (internal):                                  Negative /Negative  Sit-up pain:                                                      Negative /Negative (+/- click)  Resisted sit-up pain:                                       Negative /Negative  Resisted sit-up with adductor contraction pain:         Negative /Positive  Step-down test:                                               ++/++  Trendelenburg test:                                         Negative /Negative  Bridge test                                                       ++     EXTREMITY NEURO-VASCULAR EXAMINATION:     Sensation:  Grossly intact to light touch all dermatomal regions.   Motor Function:  Fully intact motor function at hip, knee, foot and ankle    DTRs;  quadriceps and  achilles 2+.  No clonus and downgoing Babinski.    Vascular status:  DP and PT pulses 2+, brisk capillary refill, symmetric.    Skin:  intact, compartments soft.     OTHER FINDINGS:  Right   normal exam  Flex 120  IR 35  ER 60  Abd 55  Add 30  Nl strength   ++ step down     Hip X-RAYS:  Left:  CE: 33  Mild cam  No crossover  Medial Narrowing of joint is present     Assessment:  S/p RIGHT hip scope limited motion slightly  Left hip pain  Core weakness; Abductor Weakness       Xrays: Right hip  2014 3.0mm joint space  2020 2.5mm joint space    PLAN  Left hip MRA:    Global degeneration of the glenoid labrum.    Degenerative change of the femoroacetabular joint with osteophytosis and full thickness cartilage fissuring.     Focal area of T2 hyperintensity within the intertrochanteric bone marrow.  While benign etiology such as focal red marrow hyperplasia is favored, MRI with intravenous contrast advised for further evaluation     PT for core and abductor weakness: Bilat hip abductor/core strengthening 1-2x/week x 6-8 weeks with HEP.  Left hip injection and notify us of  results in regards to relief of pain, 90% relief x 1 year  F/U in clinic PRN  All questions were answered, pt will contact us for questions or concerns in the interim.        Right hip injection cortisone with a Dr. Avila    If fails, then possible FELICIA

## 2020-01-28 ENCOUNTER — CLINICAL SUPPORT (OUTPATIENT)
Dept: REHABILITATION | Facility: HOSPITAL | Age: 55
End: 2020-01-28
Attending: ORTHOPAEDIC SURGERY
Payer: COMMERCIAL

## 2020-01-28 DIAGNOSIS — M25.551 PAIN OF RIGHT HIP JOINT: ICD-10-CM

## 2020-01-28 PROCEDURE — 97161 PT EVAL LOW COMPLEX 20 MIN: CPT

## 2020-01-28 NOTE — PLAN OF CARE
[facility]  Physical Therapy Initial Evaluation    Name: Grace Beltran  Clinic Number: [id number]    Therapy Diagnosis:   Encounter Diagnosis   Name Primary?    Pain of right hip joint      Physician: Ana Lackey MD    Physician Orders: PT Eval and Treat   Medical Diagnosis from Referral: M25.551 (ICD-10-CM) - Right hip pain  Evaluation Date: 1/28/2020  Authorization Period Expiration: 12/31/2020  Plan of Care Expiration: 4/21/2020  Visit # / Visits authorized: 1/ 25    Time In: 1520  Time Out: 1600  Total Billable Time: 40 minutes    Precautions: Standard, hx of previous R hip scope in 2012    Subjective   Date of onset: 2 months ago  History of current condition - Grace reports: that her R hip started hurting about 2 months ago when her dog got out and she ran after him.  States that she stumbled but was able to catch self.  Had sharp pain in R hip and then it started hurting. States that she had surgery on R hip in 2012.  Reports that she also broke knee about 2 years ago, had PT for that but R leg is weak.  Has osteoporosis.  Reports that sometimes her pain is sharp and has difficulty weight bearing on it and other times she is okay.  Reports that MD told her it was arthritis. Is getting injection in R hip soon.          Past Medical History:   Diagnosis Date    Acne     Dermatologist prescribes spironolactone     Anxiety     celexa 40    Chronic constipation 5/24/2017    History of patellar fracture 7/17/2019    L, slipped at home    Hot flashes due to menopause 5/24/2017    Other osteoporosis without current pathological fracture 05/24/2017    ankle & patella fracture, mom with dx    Primary osteoarthritis of left hip 05/24/2017    Dr Garay, dry needling at [facility] helping, MRI 2016, Injection didn't help much    Right elbow pain 07/20/2017    dry needling at [facility] helping    Vitamin D deficiency 8/19/2019    Dr Blas started 50,000 q 7d in August 2019      Grace Beltran  has a past surgical history that includes right hip surgery; Colonoscopy (N/A, 4/8/2019); and Colonoscopy (N/A, 6/13/2019).    Grace has a current medication list which includes the following prescription(s): cholecalciferol (vitamin d3), citalopram, and ergocalciferol.    Review of patient's allergies indicates:   Allergen Reactions    Codeine      Other reaction(s): Nausea    Hydrocodone      Other reaction(s): Vomiting  Other reaction(s): Nausea    Promethazine      Other reaction(s): Vomiting  Other reaction(s): Nausea        Imaging, X-ray: DJD and hip joint space narrowing bilaterally.  Slight concavity of the right femoral neck laterally probably related to previous surgery as noted before.  Mild Squaring of the femoral heads identified.  No fracture or dislocation.  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    Objective     Observation: Pt is healthy and in no acute distress.     Posture: no abnormalities    Hip Range of Motion:   Right Passive  Left Passive   Flexion 115 125   Ext. Rotation 30 60   Int. Rotation 20 26       Lower Extremity Strength  Right LE  Left LE    Quadriceps: 4+/5 Quadriceps: 5/5   Hamstrings: 4+/5 Hamstrings: 4+/5   Iliopsoas: 4-/5 Iliopsoas: 5/5   Hip extension:  3+/5 Hip extension: 4/5   PGM: 4/5  "PGM: 4+/5   Hip ER: 4+/5 Hip ER: 4/5   Hip IR: 4-/5* Hip IR: 5/5   *= pain in R hip    Functional Tests:  Bridge Test: NT  SL Squat Test: R: min genu valgus with decreased posterior glut activation; L: decreased posterior glut activation  SLS EO: R 30 sec, L 30 sec       Special Tests:   MADI: R +  FABERs:  R +   Hip Scour: R +    Flexibility:    Ely's test: R = pos ; L = neg    Hamstrings: R = normal ; L = normal       Joint Mobility: hypomobile on the R with long axis distraction    Palpation: TTP along adductors      CMS Impairment/Limitation/Restriction for FOTO Hip Survey    Therapist reviewed FOTO scores for Grace Beltran on 1/28/2020.   FOTO documents entered into [employer] - see Media section.    Limitation Score: 44%         TREATMENT   Treatment Time In: 1550  Treatment Time Out: 1555  Total Treatment time separate from Evaluation: 5 minutes    Grace received therapeutic exercises to develop flexibility for 5 minutes including:  Figure 4 stretch x 30" R  Cobra to child's pose 10 x 5"     NEXT:   Bike  SL clams  Bridges  Side stepping      Grace received the following manual therapy techniques: Joint mobilizations and Soft tissue Mobilization were applied to the: R hip for 0 minutes, including:  Long axis distraction - NEXT  Lateral joint mobs with belt - NEXT        Home Exercises and Patient Education Provided    Education provided re: POC, goals for therapy, role of therapy for care, exercises/HEP    Written Home Exercises Provided: yes.  Exercises were reviewed and Grace was able to demonstrate them prior to the end of the session.   Pt received a written copy of exercises to perform at home. Grace demonstrated good  understanding of the education provided.     See EMR under media for exercises given.   Assessment   Grace is a 54 y.o. female referred to outpatient Physical Therapy with a medical diagnosis of R hip pain.  Pt's c/c is R hip pain with walking, sit to stand, and prolonged sitting. "  Pt presents with decreased hip ROM, decreased hip joint mobility, decreased flexibility, and decreased LE strength.  Pt will benefit from physical therapy, specifically joint mobs, stretching, and LE strengthening, in order to reduce pt's c/o pain, improve impairments and functional limitations.    Pt prognosis is Good.   Pt will benefit from skilled outpatient Physical Therapy to address the deficits stated above and in the chart below, provide pt/family education, and to maximize pt's level of independence.     Plan of care discussed with patient: Yes  Pt's spiritual, cultural and educational needs considered and patient is agreeable to the plan of care and goals as stated below:     Anticipated Barriers for therapy: none    Medical Necessity is demonstrated by the following  History  Co-morbidities and personal factors that may impact the plan of care Co-morbidities:   Previous hx of R hip surgery  Previous hx of R knee surgery    Personal Factors:   no deficits     moderate   Examination  Body Structures and Functions, activity limitations and participation restrictions that may impact the plan of care Body Regions:   R hip    Body Systems:    gross symmetry  ROM  strength  gross coordinated movement  balance  gait  transfers  transitions  motor control  motor learning    Participation Restrictions:   Prolonged sitting, work, sit to stand    Activity limitations:   Learning and applying knowledge  no deficits    General Tasks and Commands  no deficits    Communication  no deficits    Mobility  walking  driving (bike, car, motorcycle)    Self care  no deficits    Domestic Life  doing house work (cleaning house, washing dishes, laundry)    Interactions/Relationships  no deficits    Life Areas  no deficits    Community and Social Life  community life  recreation and leisure         high   Clinical Presentation stable and uncomplicated low   Decision Making/ Complexity Score: low     GOALS: Short Term Goals:  6  weeks  1.Report decreased R hip pain  < / =  2-3/10  to increase tolerance for ADLs/prolonged sitting.  2. Increase ROM by 5-10 degrees where limited in order to perform ADLs without difficulty.  3. Increase strength by 1/3 MMT grade in R LE  to increase tolerance for ADL and work activities.  4. Pt to tolerate HEP to improve ROM and independence with ADL's    Long Term Goals: 12 weeks  1.Report decreased R hip pain < / = 0-1/10  to increase tolerance for all occupational and recreational activities  2.Patient goal: To be able to walk without pain.  3.Increase strength to 4+/5 in  R LE  to increase tolerance for ADL and work activities.  4. Pt will be able to perform sit to stand x 30 without c/o pain.          Plan   Plan of care Certification: 1/28/2020 to 4/21/2020.    Outpatient Physical Therapy 2 times weekly for 12 weeks to include the following interventions: Electrical Stimulation IFC, Gait Training, Manual Therapy, Moist Heat/ Ice, Neuromuscular Re-ed, Patient Education, Therapeutic Activites and Therapeutic Exercise.     Jacqueline Sandoval, PT, DPT, OCS

## 2020-01-30 ENCOUNTER — TELEPHONE (OUTPATIENT)
Dept: SPORTS MEDICINE | Facility: CLINIC | Age: 55
End: 2020-01-30

## 2020-01-30 NOTE — PROGRESS NOTES
"  Physical Therapy Daily Treatment Note     Name: Grace Beltran  Clinic Number: [id number]    Therapy Diagnosis:        Encounter Diagnosis   Name Primary?    Pain of right hip joint        Physician: Ana Lackey MD     Physician Orders: PT Eval and Treat   Medical Diagnosis from Referral: M25.551 (ICD-10-CM) - Right hip pain  Evaluation Date: 1/28/2020  Authorization Period Expiration: 12/31/2020  Plan of Care Expiration: 4/21/2020  Visit # / Visits authorized: 2/ 25     Time In: 1110  Time Out: 1200  Total Billable Time: 50 minutes  TX time 50'     Precautions: Standard, hx of previous R hip scope in 201    Subjective     Pt reports: mod pain in R hip after 20' on TM at home yesterday  She was compliant with home exercise program.  Response to previous treatment: soreness   Functional change: antalgic gait. Increased pain with driving and sit<>stand     Pain: 6/10  Location: right hip     Objective     Grace received therapeutic exercises to develop strength, endurance, ROM, flexibility and posture for 40 minutes including:  Bike for 10' for increased circulation, ROM and mm endurance  Figure 4 stretch 4x/30"  Cobra to child's pose 10 x 5"   B SL clams GTB 3x10   Bridges 3x10/3"  High kneel R hip flexor stretch 4x/30"  Side stepping R/L turf 1x       Grace received the following manual therapy techniques: Joint mobilizations were applied to the: R LE  for 10 minutes, including: flexor tendon release ,Long axs, including:is distraction   Lateral joint mobs with belt      Grace received cold pack for 0 minutes to increase circulation and promote tissue healing.      Home Exercises Provided and Patient Education Provided     Education provided:   Posture awareness     Written Home Exercises Provided: yes.  Exercises were reviewed and Grace was able to demonstrate them prior to the end of the session.  Grace demonstrated good  understanding of the education provided.       Assessment       Grace is " progressing well towards her goals.   Pt prognosis is Good.     Pt will continue to benefit from skilled outpatient physical therapy to address the deficits listed in the problem list box on initial evaluation, provide pt/family education and to maximize pt's level of independence in the home and community environment.     Pt's spiritual, cultural and educational needs considered and pt agreeable to plan of care and goals.    Anticipated barriers to physical therapy: none     Goals: GOALS: Short Term Goals:  6 weeks  1.Report decreased R hip pain  < / =  2-3/10  to increase tolerance for ADLs/prolonged sitting.  2. Increase ROM by 5-10 degrees where limited in order to perform ADLs without difficulty.  3. Increase strength by 1/3 MMT grade in R LE  to increase tolerance for ADL and work activities.  4. Pt to tolerate HEP to improve ROM and independence with ADL's     Long Term Goals: 12 weeks  1.Report decreased R hip pain < / = 0-1/10  to increase tolerance for all occupational and recreational activities  2.Patient goal: To be able to walk without pain.  3.Increase strength to 4+/5 in  R LE  to increase tolerance for ADL and work activities.  4. Pt will be able to perform sit to stand x 30 without c/o pain.           Plan     Cont with POC    Jay Johnson, PTA, STS

## 2020-01-30 NOTE — TELEPHONE ENCOUNTER
----- Message from Terrell Hu sent at 1/30/2020  3:30 PM CST -----  Contact: self  Pt is asking for a call back regarding her injection. Pain level and does she need a .    Contact Info

## 2020-01-31 ENCOUNTER — CLINICAL SUPPORT (OUTPATIENT)
Dept: REHABILITATION | Facility: HOSPITAL | Age: 55
End: 2020-01-31
Attending: ORTHOPAEDIC SURGERY
Payer: COMMERCIAL

## 2020-01-31 DIAGNOSIS — M81.8 OTHER OSTEOPOROSIS WITHOUT CURRENT PATHOLOGICAL FRACTURE: ICD-10-CM

## 2020-01-31 DIAGNOSIS — E55.9 VITAMIN D DEFICIENCY: ICD-10-CM

## 2020-01-31 DIAGNOSIS — M25.551 PAIN OF RIGHT HIP JOINT: ICD-10-CM

## 2020-01-31 PROCEDURE — 97110 THERAPEUTIC EXERCISES: CPT | Mod: CQ

## 2020-01-31 PROCEDURE — 97140 MANUAL THERAPY 1/> REGIONS: CPT | Mod: CQ

## 2020-01-31 RX ORDER — ERGOCALCIFEROL 1.25 MG/1
CAPSULE ORAL
Qty: 4 CAPSULE | Refills: 3 | Status: SHIPPED | OUTPATIENT
Start: 2020-01-31 | End: 2020-08-13

## 2020-02-01 ENCOUNTER — OFFICE VISIT (OUTPATIENT)
Dept: SPORTS MEDICINE | Facility: CLINIC | Age: 55
End: 2020-02-01
Payer: COMMERCIAL

## 2020-02-01 VITALS
DIASTOLIC BLOOD PRESSURE: 89 MMHG | SYSTOLIC BLOOD PRESSURE: 129 MMHG | BODY MASS INDEX: 26.12 KG/M2 | HEART RATE: 68 BPM | HEIGHT: 64 IN | WEIGHT: 153 LBS

## 2020-02-01 DIAGNOSIS — M25.551 RIGHT HIP PAIN: Primary | ICD-10-CM

## 2020-02-01 DIAGNOSIS — Z98.890 HISTORY OF HIP SURGERY: ICD-10-CM

## 2020-02-01 DIAGNOSIS — M16.11 OSTEOARTHRITIS OF RIGHT HIP, UNSPECIFIED OSTEOARTHRITIS TYPE: ICD-10-CM

## 2020-02-01 PROCEDURE — 99999 PR PBB SHADOW E&M-EST. PATIENT-LVL III: ICD-10-PCS | Mod: PBBFAC,,, | Performed by: ORTHOPAEDIC SURGERY

## 2020-02-01 PROCEDURE — 99999 PR PBB SHADOW E&M-EST. PATIENT-LVL III: CPT | Mod: PBBFAC,,, | Performed by: ORTHOPAEDIC SURGERY

## 2020-02-01 PROCEDURE — 99499 NO LOS: ICD-10-PCS | Mod: S$GLB,,, | Performed by: ORTHOPAEDIC SURGERY

## 2020-02-01 PROCEDURE — 20611 DRAIN/INJ JOINT/BURSA W/US: CPT | Mod: RT,S$GLB,, | Performed by: ORTHOPAEDIC SURGERY

## 2020-02-01 PROCEDURE — 20611 PR DRAIN/ASP/INJECT MAJOR JOINT/BURSA W/US GUIDANCE: ICD-10-PCS | Mod: RT,S$GLB,, | Performed by: ORTHOPAEDIC SURGERY

## 2020-02-01 PROCEDURE — 99499 UNLISTED E&M SERVICE: CPT | Mod: S$GLB,,, | Performed by: ORTHOPAEDIC SURGERY

## 2020-02-01 RX ORDER — TRIAMCINOLONE ACETONIDE 40 MG/ML
40 INJECTION, SUSPENSION INTRA-ARTICULAR; INTRAMUSCULAR
Status: DISCONTINUED | OUTPATIENT
Start: 2020-02-01 | End: 2021-04-30

## 2020-02-01 NOTE — PROGRESS NOTES
CC: Right Hip pain    HPI: Patient reports she has been experiencing right hip pain since December of 2019 which she attributes to occurring after chasing her dog outside that had tried to run away. When asked where her pain is today she gestures to the anterior aspect of her right hip and describes the quality of her pain as sharp and stabbing. She denies recent falls or trauma.  She just started physical therapy it has found some improvement from some deep tissue techniques.     PROCEDURE:  Large Joint Aspiration/Injection  Hip joint, right  Performed by: JAYA MITCHELL  Authorized by: JAYA MITCHELL  Consent Done?: Yes (Verbal and written)  Indications: Pain  Site marked: The procedure site was marked   Timeout: Prior to procedure the correct patient, procedure, and site was verified   Location: Hip joint, right  Prep: Patient was prepped with Chlorhexidine and alcohol.  Skin anesthetic: Ethyl Chloride spray was used prior to skin puncture.  Ultrasound Guidance for needle placement: yes  Needle size: 22 G, 3.5  Approach: Anterior  Procedure: After skin anesthetic was applied, the 22G, 3.5 needle was used to enter the hip joint capsule under US guidance. A 3 cc mixture of 1 cc of 40 mg/ml triamcinolone acetonide and 2 cc of 0.2% ropivacaine was injected into the hip joint.   Medications: 40 mg triamcinolone acetonide 40 mg/mL  Patient tolerance: Patient tolerated the procedure well with no immediate complications     Description of ultrasound utilization for needle guidance:    Ultrasound guidance was used for needle localization. Images were saved and stored for documentation. The hip joint was visualized. Dynamic visualization of the 22G x 3.5 needle was continuous throughout the procedures.    Triamcinolone:  NDC: [address]-9  LOT: [id number]  EXP: 09/2021    ASSESSMENT:  1. Right hip pain    2. Osteoarthritis of right hip, unspecified osteoarthritis type    3. History of hip surgery        PLAN:   1.   Ultrasound-guided intra-articular right hip CSI performed today. No immediate complications and the procedure was tolerated well.  2.  He/She is to communicate with Dr. Lackey and his staff over the next several days to discuss his/her short-term and long-term response to the injection.  3.  All questions were answered to the best of my ability and all concerns were addressed at this time.  4.  Follow up with Dr. Lackey per his recommendation.  Follow-up with me in 3-4 weeks to reassess, possibly OMT at that time if indicated

## 2020-02-04 RX ORDER — CITALOPRAM 40 MG/1
TABLET, FILM COATED ORAL
Qty: 90 TABLET | Refills: 3 | Status: SHIPPED | OUTPATIENT
Start: 2020-02-04 | End: 2021-03-06

## 2020-02-05 ENCOUNTER — CLINICAL SUPPORT (OUTPATIENT)
Dept: REHABILITATION | Facility: HOSPITAL | Age: 55
End: 2020-02-05
Attending: ORTHOPAEDIC SURGERY
Payer: COMMERCIAL

## 2020-02-05 DIAGNOSIS — M25.551 PAIN OF RIGHT HIP JOINT: ICD-10-CM

## 2020-02-05 PROCEDURE — 97140 MANUAL THERAPY 1/> REGIONS: CPT

## 2020-02-05 PROCEDURE — 97110 THERAPEUTIC EXERCISES: CPT

## 2020-02-05 NOTE — PROGRESS NOTES
"  Physical Therapy Daily Treatment Note     Name: Grace Beltran  Clinic Number: [id number]    Therapy Diagnosis:        Encounter Diagnosis   Name Primary?    Pain of right hip joint        Physician: Ana Lackey MD     Physician Orders: PT Eval and Treat   Medical Diagnosis from Referral: M25.551 (ICD-10-CM) - Right hip pain  Evaluation Date: 1/28/2020  Authorization Period Expiration: 12/31/2020  Plan of Care Expiration: 4/21/2020  Visit # / Visits authorized: 3/ 25     Time In: 0800  Time Out: 0900  Total Billable Time: 35 minutes  TX time 55'     Precautions: Standard, hx of previous R hip scope in 201    Subjective     Pt reports: that she is doing okay today.  Had injection but doesn't think that it has helped at all.  She was compliant with home exercise program.  Response to previous treatment: soreness   Functional change: antalgic gait. Increased pain with driving and sit<>stand     Pain: 3/10  Location: right hip     Objective     Grace received therapeutic exercises to develop strength, endurance, ROM, flexibility and posture for 40 minutes including:  Bike for 10' for increased circulation, ROM and mm endurance  Piriformis stretch 3 x 30" B  TA 10 x 10"   B SL clams GTB 3x10   Bridges w/ GTB 3x10/3"  Cobra to child's pose 20 x 5"   Seated butterfly stretch 3 x 30"   High kneel R hip flexor stretch 4x/30"  Side stepping YTB small loop x 1 lap       Grace received the following manual therapy techniques: Joint mobilizations were applied to the: R LE  for 15 minutes, including:  Hypervolt to hip flexor  Long axis distraction     Grace received cold pack for 0 minutes to increase circulation and promote tissue healing.      Home Exercises Provided and Patient Education Provided     Education provided:   Posture awareness     Written Home Exercises Provided: yes.  Exercises were reviewed and Grace was able to demonstrate them prior to the end of the session.  Grace demonstrated good  " understanding of the education provided.       Assessment     Pt tolerated treatment well today.  Improvements noted in hip and lumbar ROM.  Initiated TA's today, however pt with increased difficulty holding contraction while breathing.  Hypervolt to flexor tendon appeared to decrease some pain.  Progress as tolerated.    Grace is progressing well towards her goals.   Pt prognosis is Good.     Pt will continue to benefit from skilled outpatient physical therapy to address the deficits listed in the problem list box on initial evaluation, provide pt/family education and to maximize pt's level of independence in the home and community environment.     Pt's spiritual, cultural and educational needs considered and pt agreeable to plan of care and goals.    Anticipated barriers to physical therapy: none     Goals: GOALS: Short Term Goals:  6 weeks  1.Report decreased R hip pain  < / =  2-3/10  to increase tolerance for ADLs/prolonged sitting. Progressing, not met  2. Increase ROM by 5-10 degrees where limited in order to perform ADLs without difficulty. Progressing, not met  3. Increase strength by 1/3 MMT grade in R LE  to increase tolerance for ADL and work activities. Progressing, not met  4. Pt to tolerate HEP to improve ROM and independence with ADL's Progressing, not met     Long Term Goals: 12 weeks  1.Report decreased R hip pain < / = 0-1/10  to increase tolerance for all occupational and recreational activities Progressing, not met  2.Patient goal: To be able to walk without pain. Progressing, not met  3.Increase strength to 4+/5 in  R LE  to increase tolerance for ADL and work activities. Progressing, not met  4. Pt will be able to perform sit to stand x 30 without c/o pain.  Progressing, not met       Plan     Cont with POC, focusing on core/hip strengthening and ROM.    Jacqueline Sandoval, PT, DPT, OCS

## 2020-02-11 ENCOUNTER — CLINICAL SUPPORT (OUTPATIENT)
Dept: REHABILITATION | Facility: HOSPITAL | Age: 55
End: 2020-02-11
Attending: ORTHOPAEDIC SURGERY
Payer: COMMERCIAL

## 2020-02-11 DIAGNOSIS — M25.551 PAIN OF RIGHT HIP JOINT: ICD-10-CM

## 2020-02-11 PROCEDURE — 97110 THERAPEUTIC EXERCISES: CPT | Mod: CQ

## 2020-02-11 NOTE — PROGRESS NOTES
"  Physical Therapy Daily Treatment Note     Name: Grace Tuttleell  Clinic Number: [id number]    Therapy Diagnosis:        Encounter Diagnosis   Name Primary?    Pain of right hip joint        Physician: Ana Lackey MD     Physician Orders: PT Eval and Treat   Medical Diagnosis from Referral: M25.551 (ICD-10-CM) - Right hip pain  Evaluation Date: 1/28/2020  Authorization Period Expiration: 12/31/2020  Plan of Care Expiration: 4/21/2020  Visit # / Visits authorized: 3/ 25     Time In: 0800  Time Out: 0900  Total Billable Time: 35 minutes  TX time 55'     Precautions: Standard, hx of previous R hip scope in 201    Subjective     Pt reports: that she is doing okay today.  Had injection but doesn't think that it has helped at all.  She was compliant with home exercise program.  Response to previous treatment: soreness   Functional change: antalgic gait. Increased pain with driving and sit<>stand     Pain: 3/10  Location: right hip     Objective   25' late for tx    Grace received therapeutic exercises to develop strength, endurance, ROM, flexibility and posture for 40 minutes including:  Bike for 10' for increased circulation, ROM and mm endurance  Piriformis stretch 3 x 30" B  TA 10 x 10" np  B SL clams GTB 3x10   Bridges w/ GTB 3x10/3"  Cobra to child's pose 20 x 5"   Seated butterfly stretch 3 x 30" np  High kneel R hip flexor stretch 4x/30"np  Side stepping YTB small loop x 1 lap       Grace received the following manual therapy techniques: Joint mobilizations were applied to the: R LE  for 5 minutes, including:  Long axis distraction     Grace received cold pack for 0 minutes to increase circulation and promote tissue healing.      Home Exercises Provided and Patient Education Provided     Education provided:   Posture awareness     Written Home Exercises Provided: yes.  Exercises were reviewed and Grace was able to demonstrate them prior to the end of the session.  Grace demonstrated good  " understanding of the education provided.       Assessment     Pt tolerated treatment well today. Continue with hip strengthening  Progress as tolerated.    Grace is progressing well towards her goals.   Pt prognosis is Good.     Pt will continue to benefit from skilled outpatient physical therapy to address the deficits listed in the problem list box on initial evaluation, provide pt/family education and to maximize pt's level of independence in the home and community environment.     Pt's spiritual, cultural and educational needs considered and pt agreeable to plan of care and goals.    Anticipated barriers to physical therapy: none     Goals: GOALS: Short Term Goals:  6 weeks  1.Report decreased R hip pain  < / =  2-3/10  to increase tolerance for ADLs/prolonged sitting. Progressing, not met  2. Increase ROM by 5-10 degrees where limited in order to perform ADLs without difficulty. Progressing, not met  3. Increase strength by 1/3 MMT grade in R LE  to increase tolerance for ADL and work activities. Progressing, not met  4. Pt to tolerate HEP to improve ROM and independence with ADL's Progressing, not met     Long Term Goals: 12 weeks  1.Report decreased R hip pain < / = 0-1/10  to increase tolerance for all occupational and recreational activities Progressing, not met  2.Patient goal: To be able to walk without pain. Progressing, not met  3.Increase strength to 4+/5 in  R LE  to increase tolerance for ADL and work activities. Progressing, not met  4. Pt will be able to perform sit to stand x 30 without c/o pain.  Progressing, not met       Plan     Cont with POC, focusing on core/hip strengthening and ROM.    Jay Johnson, PTA, STS

## 2020-02-13 ENCOUNTER — CLINICAL SUPPORT (OUTPATIENT)
Dept: REHABILITATION | Facility: HOSPITAL | Age: 55
End: 2020-02-13
Attending: ORTHOPAEDIC SURGERY
Payer: COMMERCIAL

## 2020-02-13 DIAGNOSIS — M25.551 PAIN OF RIGHT HIP JOINT: ICD-10-CM

## 2020-02-13 PROCEDURE — 97110 THERAPEUTIC EXERCISES: CPT | Mod: CQ

## 2020-02-13 NOTE — PROGRESS NOTES
"  Physical Therapy Daily Treatment Note     Name: Grace Beltran  Clinic Number: [id number]    Therapy Diagnosis:        Encounter Diagnosis   Name Primary?    Pain of right hip joint        Physician: Ana Lackey MD     Physician Orders: PT Eval and Treat   Medical Diagnosis from Referral: M25.551 (ICD-10-CM) - Right hip pain  Evaluation Date: 1/28/2020  Authorization Period Expiration: 12/31/2020  Plan of Care Expiration: 4/21/2020  Visit # / Visits authorized: 4/ 25     Time In: 0815  Time Out: 0855  Total Billable Time: 40 minutes  TX time 40     Precautions: Standard, hx of previous R hip scope in 201    Subjective     Pt reports: that she is doing okay today.    She was compliant with home exercise program.  Response to previous treatment: soreness   Functional change: antalgic gait. Increased pain with driving and sit<>stand     Pain: 3/10  Location: right hip     Objective   15' late for tx and needing to leave 5' early    Grace received therapeutic exercises to develop strength, endurance, ROM, flexibility and posture for 40 minutes including:  Bike for 10' for increased circulation, ROM and mm endurance  Piriformis stretch 3 x 30" B  TA 10 x 10"   B SL clams GTB 3x10   Bridges w/ GTB 3x10/3"  Cobra to child's pose 20 x 5"   B OTB reverse clams 3x10    Not today   Seated butterfly stretch 3 x 30" np  High kneel R hip flexor stretch 4x/30"np  Side stepping YTB small loop        Grace received the following manual therapy techniques: Joint mobilizations were applied to the: R LE  for 00 minutes, including:  Long axis distraction     Grace received cold pack for 0 minutes to increase circulation and promote tissue healing.      Home Exercises Provided and Patient Education Provided     Education provided:   Posture awareness     Written Home Exercises Provided: yes.  Exercises were reviewed and Grace was able to demonstrate them prior to the end of the session.  Grace demonstrated good  " understanding of the education provided.       Assessment     Pt tolerated treatment well today. Shortened session due to patient late and having to leave early. improved tolerance with hip strengthening activity w/o increased pain symptoms. VC/TC for correcting form/technique. Progress as tolerated.    Grace is progressing well towards her goals.   Pt prognosis is Good.     Pt will continue to benefit from skilled outpatient physical therapy to address the deficits listed in the problem list box on initial evaluation, provide pt/family education and to maximize pt's level of independence in the home and community environment.     Pt's spiritual, cultural and educational needs considered and pt agreeable to plan of care and goals.    Anticipated barriers to physical therapy: none     Goals: GOALS: Short Term Goals:  6 weeks  1.Report decreased R hip pain  < / =  2-3/10  to increase tolerance for ADLs/prolonged sitting. Progressing, not met  2. Increase ROM by 5-10 degrees where limited in order to perform ADLs without difficulty. Progressing, not met  3. Increase strength by 1/3 MMT grade in R LE  to increase tolerance for ADL and work activities. Progressing, not met  4. Pt to tolerate HEP to improve ROM and independence with ADL's Progressing, not met     Long Term Goals: 12 weeks  1.Report decreased R hip pain < / = 0-1/10  to increase tolerance for all occupational and recreational activities Progressing, not met  2.Patient goal: To be able to walk without pain. Progressing, not met  3.Increase strength to 4+/5 in  R LE  to increase tolerance for ADL and work activities. Progressing, not met  4. Pt will be able to perform sit to stand x 30 without c/o pain.  Progressing, not met       Plan     Cont with POC, focusing on core/hip strengthening and ROM.    Jay Johnson, PTA, STS

## 2020-02-18 ENCOUNTER — CLINICAL SUPPORT (OUTPATIENT)
Dept: REHABILITATION | Facility: HOSPITAL | Age: 55
End: 2020-02-18
Attending: ORTHOPAEDIC SURGERY
Payer: COMMERCIAL

## 2020-02-18 DIAGNOSIS — M25.551 PAIN OF RIGHT HIP JOINT: ICD-10-CM

## 2020-02-18 PROCEDURE — 97140 MANUAL THERAPY 1/> REGIONS: CPT | Mod: CQ

## 2020-02-18 PROCEDURE — 97110 THERAPEUTIC EXERCISES: CPT | Mod: CQ

## 2020-02-18 NOTE — PROGRESS NOTES
"  Physical Therapy Daily Treatment Note     Name: Grace Beltran  Clinic Number: [id number]    Therapy Diagnosis:        Encounter Diagnosis   Name Primary?    Pain of right hip joint        Physician: Ana Lackey MD     Physician Orders: PT Eval and Treat   Medical Diagnosis from Referral: M25.551 (ICD-10-CM) - Right hip pain  Evaluation Date: 1/28/2020  Authorization Period Expiration: 12/31/2020  Plan of Care Expiration: 4/21/2020  Visit # / Visits authorized: 4/ 25     Time In: 0820  Time Out: 0900  Total Billable Time: 40 minutes  TX time 40     Precautions: Standard, hx of previous R hip scope in 201    Subjective     Pt reports: min pain in R hip after chaperoning band in parade. I walked six miles.    She was compliant with home exercise program.  Response to previous treatment: soreness   Functional change: antalgic gait. Increased pain with driving and sit<>stand     Pain: 3/10  Location: right hip     Objective   20' late for tx and needing to leave 5' early    Grace received therapeutic exercises to develop strength, endurance, ROM, flexibility and posture for 30 minutes including:  Bike for 10' for increased circulation, ROM and mm endurance  Piriformis stretch 3 x 30" B  TA 10 x 10"   B SL clams GTB 3x10   Bridges w/ GTB 3x10/3"  Cobra to child's pose 20 x 5"   B OTB reverse clams 3x10    Not today   Seated butterfly stretch 3 x 30" np  High kneel R hip flexor stretch 4x/30"np  Side stepping YTB small loop        Grace received the following manual therapy techniques: Joint mobilizations were applied to the: R LE  for 10 minutes, including:  Long axis distraction, piriformis stretch     Grace received cold pack for 0 minutes to increase circulation and promote tissue healing.      Home Exercises Provided and Patient Education Provided     Education provided:   Posture awareness     Written Home Exercises Provided: yes.  Exercises were reviewed and Grace was able to demonstrate them prior to " the end of the session.  Grace demonstrated good  understanding of the education provided.       Assessment     Pt tolerated treatment well today. Shortened session due to patient late and having to leave early. improved tolerance with hip strengthening activity. Min B glut soreness due to walking over the weekend in [facility]. VC/TC for correcting form/technique. Progress as tolerated.    Grace is progressing well towards her goals.   Pt prognosis is Good.     Pt will continue to benefit from skilled outpatient physical therapy to address the deficits listed in the problem list box on initial evaluation, provide pt/family education and to maximize pt's level of independence in the home and community environment.     Pt's spiritual, cultural and educational needs considered and pt agreeable to plan of care and goals.    Anticipated barriers to physical therapy: none     Goals: GOALS: Short Term Goals:  6 weeks  1.Report decreased R hip pain  < / =  2-3/10  to increase tolerance for ADLs/prolonged sitting. Progressing, not met  2. Increase ROM by 5-10 degrees where limited in order to perform ADLs without difficulty. Progressing, not met  3. Increase strength by 1/3 MMT grade in R LE  to increase tolerance for ADL and work activities. Progressing, not met  4. Pt to tolerate HEP to improve ROM and independence with ADL's Progressing, not met     Long Term Goals: 12 weeks  1.Report decreased R hip pain < / = 0-1/10  to increase tolerance for all occupational and recreational activities Progressing, not met  2.Patient goal: To be able to walk without pain. Progressing, not met  3.Increase strength to 4+/5 in  R LE  to increase tolerance for ADL and work activities. Progressing, not met  4. Pt will be able to perform sit to stand x 30 without c/o pain.  Progressing, not met       Plan     Cont with POC, focusing on core/hip strengthening and ROM.    Jay Johnson, PTA, STS

## 2020-02-28 ENCOUNTER — CLINICAL SUPPORT (OUTPATIENT)
Dept: REHABILITATION | Facility: HOSPITAL | Age: 55
End: 2020-02-28
Attending: ORTHOPAEDIC SURGERY
Payer: COMMERCIAL

## 2020-02-28 DIAGNOSIS — M25.551 PAIN OF RIGHT HIP JOINT: ICD-10-CM

## 2020-02-28 PROCEDURE — 97140 MANUAL THERAPY 1/> REGIONS: CPT | Mod: CQ

## 2020-02-28 PROCEDURE — 97110 THERAPEUTIC EXERCISES: CPT | Mod: CQ

## 2020-02-28 NOTE — PROGRESS NOTES
"  Physical Therapy Daily Treatment Note     Name: Grace Beltran  Clinic Number: [id number]    Therapy Diagnosis:        Encounter Diagnosis   Name Primary?    Pain of right hip joint        Physician: Ana Lackey MD     Physician Orders: PT Eval and Treat   Medical Diagnosis from Referral: M25.551 (ICD-10-CM) - Right hip pain  Evaluation Date: 1/28/2020  Authorization Period Expiration: 12/31/2020  Plan of Care Expiration: 4/21/2020  Visit # / Visits authorized: 7/ 25     Time In: 0810  Time Out: 0900  Total Billable Time: 40 minutes  TX time 50     Precautions: Standard, hx of previous R hip scope in 201    Subjective     Pt reports: no pain at present time but noted soreness/hip flexor tightness over the weekend due increased walking during [place].  She was compliant with home exercise program.  Response to previous treatment: soreness   Functional change: antalgic gait. Increased pain with driving and sit<>stand     Pain: 3/10  Location: right hip     Objective   10' late for tx     Grace received therapeutic exercises to develop strength, endurance, ROM, flexibility and posture for 35 minutes including:  Elliptical  for 10' for increased circulation, ROM and mm endurance  Piriformis stretch 3 x 30" B  TA 10 x 10"   B SL clams GTB 3x10   Bridges w/ GTB 3x10/3"  Cobra to child's pose 20 x 5"   B OTB reverse clams 3x10  B high kneeling hip flexor stretch 2x/20"  Side stepping YTB 1 lap     Grace received the following manual therapy techniques: Joint mobilizations were applied to the: R LE  for 15 minutes, including:  Long axis distraction, piriformis stretch, hip flexor tendon release, hip flex/quad stretch     Grace received cold pack for 0 minutes to increase circulation and promote tissue healing.      Home Exercises Provided and Patient Education Provided     Education provided:   Posture awareness     Written Home Exercises Provided: yes.  Exercises were reviewed and Grace was able to " demonstrate them prior to the end of the session.  Grace demonstrated good  understanding of the education provided.       Assessment     Pt tolerated treatment well today. Min fatigue with addition of elliptical for increased endurance and strengthening. Decreased hip flexor discomfort with manual therapy.  VC/TC for correcting form/technique. Progress as tolerated.    Grace is progressing well towards her goals.   Pt prognosis is Good.     Pt will continue to benefit from skilled outpatient physical therapy to address the deficits listed in the problem list box on initial evaluation, provide pt/family education and to maximize pt's level of independence in the home and community environment.     Pt's spiritual, cultural and educational needs considered and pt agreeable to plan of care and goals.    Anticipated barriers to physical therapy: none     Goals: GOALS: Short Term Goals:  6 weeks  1.Report decreased R hip pain  < / =  2-3/10  to increase tolerance for ADLs/prolonged sitting. Progressing, not met  2. Increase ROM by 5-10 degrees where limited in order to perform ADLs without difficulty. Progressing, not met  3. Increase strength by 1/3 MMT grade in R LE  to increase tolerance for ADL and work activities. Progressing, not met  4. Pt to tolerate HEP to improve ROM and independence with ADL's Progressing, not met     Long Term Goals: 12 weeks  1.Report decreased R hip pain < / = 0-1/10  to increase tolerance for all occupational and recreational activities Progressing, not met  2.Patient goal: To be able to walk without pain. Progressing, not met  3.Increase strength to 4+/5 in  R LE  to increase tolerance for ADL and work activities. Progressing, not met  4. Pt will be able to perform sit to stand x 30 without c/o pain.  Progressing, not met       Plan     Cont with POC, focusing on core/hip strengthening and ROM.    Jay Johnson, PTA, STS

## 2020-04-06 ENCOUNTER — PATIENT MESSAGE (OUTPATIENT)
Dept: SPORTS MEDICINE | Facility: CLINIC | Age: 55
End: 2020-04-06

## 2020-04-30 ENCOUNTER — PATIENT MESSAGE (OUTPATIENT)
Dept: SPORTS MEDICINE | Facility: CLINIC | Age: 55
End: 2020-04-30

## 2020-08-12 ENCOUNTER — TELEPHONE (OUTPATIENT)
Dept: PRIMARY CARE CLINIC | Facility: CLINIC | Age: 55
End: 2020-08-12

## 2020-08-13 ENCOUNTER — OFFICE VISIT (OUTPATIENT)
Dept: PRIMARY CARE CLINIC | Facility: CLINIC | Age: 55
End: 2020-08-13
Payer: COMMERCIAL

## 2020-08-13 DIAGNOSIS — U07.1 REAL TIME REVERSE TRANSCRIPTASE PCR POSITIVE FOR COVID-19 VIRUS: ICD-10-CM

## 2020-08-13 PROCEDURE — 99213 OFFICE O/P EST LOW 20 MIN: CPT | Mod: 95,,, | Performed by: FAMILY MEDICINE

## 2020-08-13 PROCEDURE — 99213 PR OFFICE/OUTPT VISIT, EST, LEVL III, 20-29 MIN: ICD-10-PCS | Mod: 95,,, | Performed by: FAMILY MEDICINE

## 2020-08-13 NOTE — PROGRESS NOTES
Subjective:      Patient ID: Grace Beltran is a 54 y.o. female.    Chief Complaint: No chief complaint on file.    The patient location is: home  The chief complaint leading to consultation is: COVID    Visit type: audiovisual    Face to Face time with patient: 7 min  10 minutes of total time spent on the encounter, which includes face to face time and non-face to face time preparing to see the patient (eg, review of tests), Obtaining and/or reviewing separately obtained history, Documenting clinical information in the electronic or other health record, Independently interpreting results (not separately reported) and communicating results to the patient/family/caregiver, or Care coordination (not separately reported).         Each patient to whom he or she provides medical services by telemedicine is:  (1) informed of the relationship between the physician and patient and the respective role of any other health care provider with respect to management of the patient; and (2) notified that he or she may decline to receive medical services by telemedicine and may withdraw from such care at any time.    Notes:     She is an  working for [employer] & is currently working with COVID data.     She Tested positive COVID 8/6 nasalpharyngeal uncomfortable swab since she had free antibody at work [employer]. Test yesterday at different site [facility] self swab (pending results) . dtr is cheerlemegan Ponce at feels she may have contracted it from her .  But her daughter had no symptoms, also her daughter's self swab was negative, but she may not have done the test correctly.    She is asking when her partner can move back into the house as she has no symptoms and it has been 7d since positive test.      Current Outpatient Medications:     cholecalciferol, vitamin D3, (VITAMIN D3) 1,000 unit capsule, Take 2 capsules (2,000 Units total) by mouth once daily., Disp: , Rfl: 0    citalopram (CELEXA) 40 MG tablet, TAKE 1  TABLET BY MOUTH ONCE DAILY, Disp: 90 tablet, Rfl: 3    ergocalciferol (ERGOCALCIFEROL) 50,000 unit Cap, TAKE 1 CAPSULE BY MOUTH EVERY 7 DAYS, Disp: 4 capsule, Rfl: 3    Current Facility-Administered Medications:     triamcinolone acetonide injection 40 mg, 40 mg, Intramuscular, 1 time in Clinic/HOD, Alessio Avila DO        Past Medical History:   Diagnosis Date    Acne     Dermatologist prescribes spironolactone     Anxiety     celexa 40    Chronic constipation 5/24/2017    History of patellar fracture 7/17/2019    L, slipped at home    Hot flashes due to menopause 5/24/2017    Other osteoporosis without current pathological fracture 05/24/2017    ankle & patella fracture, mom with dx    Primary osteoarthritis of left hip 05/24/2017    Dr Garay, dry needling at [facility] helping, MRI 2016, Injection didn't help much    Right elbow pain 07/20/2017    [facility]    Vitamin D deficiency 8/19/2019    Dr Blas started 50,000 q 7d in August 2019     Past Surgical History:   Procedure Laterality Date    COLONOSCOPY N/A 4/8/2019    Procedure: COLONOSCOPY;  Surgeon: Rakesh Parmar MD;  Location: [facility] (4TH FLR);  Service: Endoscopy;  Laterality: N/A;    COLONOSCOPY N/A 6/13/2019    Procedure: COLONOSCOPY - Device-assisted;  Surgeon: Sravan Haji MD;  Location: 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  Objective:   Physical Exam  Assessment:     1. Real time reverse transcriptase PCR positive for COVID-19 virus      Plan:        We discussed all things COVID related, decreased sensitivity of test with different user administration & less so with self swab.     Let me know if you have any further questions or develop symptoms    Your COVID-19 nasal test shows you tested POSITIVE for the active virus. You are on day 7 of NO SYMPTOMS at all.     Please close your [employer] [place] for a total of 14 days. Please let everyone you've been in contract with for the PAST 14 days know that you tested positive & they could be carrying the virus also. (she has been contacted concerning contact tracing) Recommend that they self quarantine for 14 days. They do not need to be tested unless they develop symptoms.     TELEMED  There are no Patient Instructions on file for this visit.                            Answers for HPI/ROS submitted by the patient on 8/13/2020   activity change: No  unexpected weight change: No  neck pain: No  hearing loss: No  rhinorrhea: No  trouble swallowing: No  eye discharge: No  visual disturbance: No  chest tightness: No  wheezing: No  chest pain: No  palpitations: No  blood in stool: No  constipation: No  vomiting: No  diarrhea: No  polydipsia: No  polyuria: No  difficulty urinating: No  hematuria: No  menstrual problem: No  dysuria: No  joint swelling: No  arthralgias: No  headaches: No  weakness: No  confusion: No  dysphoric mood: No

## 2020-09-04 ENCOUNTER — PATIENT OUTREACH (OUTPATIENT)
Dept: ADMINISTRATIVE | Facility: OTHER | Age: 55
End: 2020-09-04

## 2020-09-04 NOTE — PROGRESS NOTES
Care Everywhere:   Immunization: updated  Health Maintenance: updated  Media Review: review for outside mammogram report   Legacy Review:   Order placed:   Upcoming appts:  Mammogram scheduling ticket sent to patient's portal

## 2020-10-13 ENCOUNTER — PATIENT MESSAGE (OUTPATIENT)
Dept: ENDOCRINOLOGY | Facility: CLINIC | Age: 55
End: 2020-10-13

## 2020-10-13 ENCOUNTER — PATIENT MESSAGE (OUTPATIENT)
Dept: RHEUMATOLOGY | Facility: CLINIC | Age: 55
End: 2020-10-13

## 2020-10-13 ENCOUNTER — PATIENT OUTREACH (OUTPATIENT)
Dept: ADMINISTRATIVE | Facility: OTHER | Age: 55
End: 2020-10-13

## 2020-10-13 ENCOUNTER — OFFICE VISIT (OUTPATIENT)
Dept: RHEUMATOLOGY | Facility: CLINIC | Age: 55
End: 2020-10-13
Payer: COMMERCIAL

## 2020-10-13 VITALS
HEIGHT: 64 IN | WEIGHT: 158.94 LBS | DIASTOLIC BLOOD PRESSURE: 84 MMHG | BODY MASS INDEX: 27.13 KG/M2 | TEMPERATURE: 98 F | SYSTOLIC BLOOD PRESSURE: 121 MMHG | HEART RATE: 71 BPM

## 2020-10-13 DIAGNOSIS — E55.9 VITAMIN D DEFICIENCY: Primary | ICD-10-CM

## 2020-10-13 DIAGNOSIS — M81.8 OTHER OSTEOPOROSIS WITHOUT CURRENT PATHOLOGICAL FRACTURE: Primary | ICD-10-CM

## 2020-10-13 DIAGNOSIS — E55.9 VITAMIN D DEFICIENCY: ICD-10-CM

## 2020-10-13 DIAGNOSIS — M25.552 BILATERAL HIP PAIN: ICD-10-CM

## 2020-10-13 DIAGNOSIS — M25.551 BILATERAL HIP PAIN: ICD-10-CM

## 2020-10-13 DIAGNOSIS — M81.8 OTHER OSTEOPOROSIS WITHOUT CURRENT PATHOLOGICAL FRACTURE: ICD-10-CM

## 2020-10-13 PROCEDURE — 3008F PR BODY MASS INDEX (BMI) DOCUMENTED: ICD-10-PCS | Mod: CPTII,S$GLB,, | Performed by: INTERNAL MEDICINE

## 2020-10-13 PROCEDURE — 99204 PR OFFICE/OUTPT VISIT, NEW, LEVL IV, 45-59 MIN: ICD-10-PCS | Mod: S$GLB,,, | Performed by: INTERNAL MEDICINE

## 2020-10-13 PROCEDURE — 99204 OFFICE O/P NEW MOD 45 MIN: CPT | Mod: S$GLB,,, | Performed by: INTERNAL MEDICINE

## 2020-10-13 PROCEDURE — 99999 PR PBB SHADOW E&M-EST. PATIENT-LVL III: ICD-10-PCS | Mod: PBBFAC,,, | Performed by: INTERNAL MEDICINE

## 2020-10-13 PROCEDURE — 99999 PR PBB SHADOW E&M-EST. PATIENT-LVL III: CPT | Mod: PBBFAC,,, | Performed by: INTERNAL MEDICINE

## 2020-10-13 PROCEDURE — 3008F BODY MASS INDEX DOCD: CPT | Mod: CPTII,S$GLB,, | Performed by: INTERNAL MEDICINE

## 2020-10-13 RX ORDER — ZOLEDRONIC ACID 5 MG/100ML
5 INJECTION, SOLUTION INTRAVENOUS
Status: CANCELLED | OUTPATIENT
Start: 2020-10-13

## 2020-10-13 RX ORDER — ACETAMINOPHEN 500 MG
500 TABLET ORAL
Status: CANCELLED | OUTPATIENT
Start: 2020-10-13

## 2020-10-13 RX ORDER — SODIUM CHLORIDE 0.9 % (FLUSH) 0.9 %
10 SYRINGE (ML) INJECTION
Status: CANCELLED | OUTPATIENT
Start: 2020-10-13

## 2020-10-13 RX ORDER — HEPARIN 100 UNIT/ML
500 SYRINGE INTRAVENOUS
Status: CANCELLED | OUTPATIENT
Start: 2020-10-13

## 2020-10-13 ASSESSMENT — ROUTINE ASSESSMENT OF PATIENT INDEX DATA (RAPID3)
MDHAQ FUNCTION SCORE: 0.2
PATIENT GLOBAL ASSESSMENT SCORE: 3
AM STIFFNESS SCORE: 0, NO
TOTAL RAPID3 SCORE: 1.22
PSYCHOLOGICAL DISTRESS SCORE: 0
PAIN SCORE: 0
FATIGUE SCORE: 0

## 2020-10-13 NOTE — PROGRESS NOTES
Care Everywhere:   Immunization: updated  Health Maintenance: updated  Media Review:   Legacy Review:   Order placed:   Upcoming appts:  Mammogram scheduling ticket sent to patient's portal on 9/4/2020

## 2020-10-13 NOTE — Clinical Note
Ms. Devin has been trying to get Reclast scheduled.  She is unable to reach Dr. Blas.  She asked that I contact the department for assistance.

## 2020-10-13 NOTE — Clinical Note
Prince Terrell,  Ms. Beltran is due for her second Reclast injection. I have signed the therapy plan. Could you please get her scheduled?  Best,  Deanna Bolton

## 2020-10-13 NOTE — PROGRESS NOTES
Rapid3 Question Responses and Scores 10/13/2020   MDHAQ Score 0.2   Psychologic Score 0   Pain Score 0   When you awakened in the morning OVER THE LAST WEEK, did you feel stiff? No   Fatigue Score 0   Global Health Score 3   RAPID3 Score 1.22       Answers for HPI/ROS submitted by the patient on 10/13/2020   fever: No  eye redness: No  mouth sores: No  headaches: No  shortness of breath: No  chest pain: No  trouble swallowing: No  diarrhea: No  constipation: No  unexpected weight change: No  genital sore: No  dysuria: No  During the last 3 days, have you had a skin rash?: No  Bruises or bleeds easily: No  cough: No

## 2020-10-21 ENCOUNTER — PATIENT MESSAGE (OUTPATIENT)
Dept: RHEUMATOLOGY | Facility: CLINIC | Age: 55
End: 2020-10-21

## 2020-12-08 ENCOUNTER — PATIENT MESSAGE (OUTPATIENT)
Dept: PRIMARY CARE CLINIC | Facility: CLINIC | Age: 55
End: 2020-12-08

## 2020-12-09 ENCOUNTER — CLINICAL SUPPORT (OUTPATIENT)
Dept: URGENT CARE | Facility: CLINIC | Age: 55
End: 2020-12-09
Payer: COMMERCIAL

## 2020-12-09 ENCOUNTER — TELEPHONE (OUTPATIENT)
Dept: PRIMARY CARE CLINIC | Facility: CLINIC | Age: 55
End: 2020-12-09

## 2020-12-09 DIAGNOSIS — Z00.00 ROUTINE GENERAL MEDICAL EXAMINATION AT A HEALTH CARE FACILITY: Primary | ICD-10-CM

## 2020-12-09 DIAGNOSIS — Z20.822 CLOSE EXPOSURE TO COVID-19 VIRUS: Primary | ICD-10-CM

## 2020-12-09 DIAGNOSIS — Z12.31 OTHER SCREENING MAMMOGRAM: ICD-10-CM

## 2020-12-09 LAB
CTP QC/QA: YES
SARS-COV-2 RDRP RESP QL NAA+PROBE: NEGATIVE

## 2020-12-09 PROCEDURE — U0002 COVID-19 LAB TEST NON-CDC: HCPCS | Mod: QW,S$GLB,, | Performed by: EMERGENCY MEDICINE

## 2020-12-09 PROCEDURE — U0002: ICD-10-PCS | Mod: QW,S$GLB,, | Performed by: EMERGENCY MEDICINE

## 2020-12-10 ENCOUNTER — LAB VISIT (OUTPATIENT)
Dept: LAB | Facility: HOSPITAL | Age: 55
End: 2020-12-10
Attending: FAMILY MEDICINE
Payer: COMMERCIAL

## 2020-12-10 DIAGNOSIS — Z00.00 ROUTINE GENERAL MEDICAL EXAMINATION AT A HEALTH CARE FACILITY: ICD-10-CM

## 2020-12-10 LAB
ALBUMIN SERPL BCP-MCNC: 3.8 G/DL (ref 3.5–5.2)
ALP SERPL-CCNC: 87 U/L (ref 55–135)
ALT SERPL W/O P-5'-P-CCNC: 31 U/L (ref 10–44)
ANION GAP SERPL CALC-SCNC: 9 MMOL/L (ref 8–16)
AST SERPL-CCNC: 28 U/L (ref 10–40)
BASOPHILS # BLD AUTO: 0.02 K/UL (ref 0–0.2)
BASOPHILS NFR BLD: 0.5 % (ref 0–1.9)
BILIRUB SERPL-MCNC: 0.5 MG/DL (ref 0.1–1)
BUN SERPL-MCNC: 13 MG/DL (ref 6–20)
CALCIUM SERPL-MCNC: 9 MG/DL (ref 8.7–10.5)
CHLORIDE SERPL-SCNC: 104 MMOL/L (ref 95–110)
CHOLEST SERPL-MCNC: 215 MG/DL (ref 120–199)
CHOLEST/HDLC SERPL: 3 {RATIO} (ref 2–5)
CO2 SERPL-SCNC: 29 MMOL/L (ref 23–29)
CREAT SERPL-MCNC: 0.8 MG/DL (ref 0.5–1.4)
DIFFERENTIAL METHOD: NORMAL
EOSINOPHIL # BLD AUTO: 0 K/UL (ref 0–0.5)
EOSINOPHIL NFR BLD: 0.9 % (ref 0–8)
ERYTHROCYTE [DISTWIDTH] IN BLOOD BY AUTOMATED COUNT: 12.3 % (ref 11.5–14.5)
EST. GFR  (AFRICAN AMERICAN): >60 ML/MIN/1.73 M^2
EST. GFR  (NON AFRICAN AMERICAN): >60 ML/MIN/1.73 M^2
GLUCOSE SERPL-MCNC: 88 MG/DL (ref 70–110)
HCT VFR BLD AUTO: 41.4 % (ref 37–48.5)
HDLC SERPL-MCNC: 71 MG/DL (ref 40–75)
HDLC SERPL: 33 % (ref 20–50)
HGB BLD-MCNC: 13.7 G/DL (ref 12–16)
IMM GRANULOCYTES # BLD AUTO: 0 K/UL (ref 0–0.04)
IMM GRANULOCYTES NFR BLD AUTO: 0 % (ref 0–0.5)
LDLC SERPL CALC-MCNC: 124 MG/DL (ref 63–159)
LYMPHOCYTES # BLD AUTO: 1.4 K/UL (ref 1–4.8)
LYMPHOCYTES NFR BLD: 32.6 % (ref 18–48)
MCH RBC QN AUTO: 30.4 PG (ref 27–31)
MCHC RBC AUTO-ENTMCNC: 33.1 G/DL (ref 32–36)
MCV RBC AUTO: 92 FL (ref 82–98)
MONOCYTES # BLD AUTO: 0.3 K/UL (ref 0.3–1)
MONOCYTES NFR BLD: 6.7 % (ref 4–15)
NEUTROPHILS # BLD AUTO: 2.6 K/UL (ref 1.8–7.7)
NEUTROPHILS NFR BLD: 59.3 % (ref 38–73)
NONHDLC SERPL-MCNC: 144 MG/DL
NRBC BLD-RTO: 0 /100 WBC
PLATELET # BLD AUTO: 266 K/UL (ref 150–350)
PMV BLD AUTO: 10.5 FL (ref 9.2–12.9)
POTASSIUM SERPL-SCNC: 4.1 MMOL/L (ref 3.5–5.1)
PROT SERPL-MCNC: 6.9 G/DL (ref 6–8.4)
RBC # BLD AUTO: 4.51 M/UL (ref 4–5.4)
SODIUM SERPL-SCNC: 142 MMOL/L (ref 136–145)
TRIGL SERPL-MCNC: 100 MG/DL (ref 30–150)
TSH SERPL DL<=0.005 MIU/L-ACNC: 0.82 UIU/ML (ref 0.4–4)
WBC # BLD AUTO: 4.33 K/UL (ref 3.9–12.7)

## 2020-12-10 PROCEDURE — 84443 ASSAY THYROID STIM HORMONE: CPT

## 2020-12-10 PROCEDURE — 36415 COLL VENOUS BLD VENIPUNCTURE: CPT | Mod: PN

## 2020-12-10 PROCEDURE — 80061 LIPID PANEL: CPT

## 2020-12-10 PROCEDURE — 85025 COMPLETE CBC W/AUTO DIFF WBC: CPT

## 2020-12-10 PROCEDURE — 80053 COMPREHEN METABOLIC PANEL: CPT

## 2020-12-15 ENCOUNTER — OFFICE VISIT (OUTPATIENT)
Dept: PRIMARY CARE CLINIC | Facility: CLINIC | Age: 55
End: 2020-12-15
Payer: COMMERCIAL

## 2020-12-15 VITALS
OXYGEN SATURATION: 97 % | HEART RATE: 67 BPM | SYSTOLIC BLOOD PRESSURE: 110 MMHG | DIASTOLIC BLOOD PRESSURE: 80 MMHG | HEIGHT: 64 IN | BODY MASS INDEX: 26.16 KG/M2 | WEIGHT: 153.25 LBS

## 2020-12-15 DIAGNOSIS — F41.9 ANXIETY: ICD-10-CM

## 2020-12-15 DIAGNOSIS — Z12.31 OTHER SCREENING MAMMOGRAM: ICD-10-CM

## 2020-12-15 DIAGNOSIS — Z00.00 ROUTINE GENERAL MEDICAL EXAMINATION AT A HEALTH CARE FACILITY: Primary | ICD-10-CM

## 2020-12-15 DIAGNOSIS — M81.8 OTHER OSTEOPOROSIS WITHOUT CURRENT PATHOLOGICAL FRACTURE: ICD-10-CM

## 2020-12-15 PROCEDURE — 3008F BODY MASS INDEX DOCD: CPT | Mod: CPTII,S$GLB,, | Performed by: FAMILY MEDICINE

## 2020-12-15 PROCEDURE — 99999 PR PBB SHADOW E&M-EST. PATIENT-LVL III: CPT | Mod: PBBFAC,,, | Performed by: FAMILY MEDICINE

## 2020-12-15 PROCEDURE — 99999 PR PBB SHADOW E&M-EST. PATIENT-LVL III: ICD-10-PCS | Mod: PBBFAC,,, | Performed by: FAMILY MEDICINE

## 2020-12-15 PROCEDURE — 99396 PREV VISIT EST AGE 40-64: CPT | Mod: S$GLB,,, | Performed by: FAMILY MEDICINE

## 2020-12-15 PROCEDURE — 99396 PR PREVENTIVE VISIT,EST,40-64: ICD-10-PCS | Mod: S$GLB,,, | Performed by: FAMILY MEDICINE

## 2020-12-15 PROCEDURE — 3008F PR BODY MASS INDEX (BMI) DOCUMENTED: ICD-10-PCS | Mod: CPTII,S$GLB,, | Performed by: FAMILY MEDICINE

## 2020-12-15 NOTE — PROGRESS NOTES
Subjective:      Patient ID: Grace Beltran is a 55 y.o. female.    Chief Complaint: Annual Exam    Here today for general exam.    She follows with rheumatology for Reclast . After this, she had one day of flu like symptoms, so she put off getting her next dose until 2021    She would like shingles vaccine    Denies any chest pain, shortness of breath, nausea vomiting constipation diarrhea, blood in stool, heartburn      Current Outpatient Medications:     citalopram (CELEXA) 40 MG tablet, TAKE 1 TABLET BY MOUTH ONCE DAILY, Disp: 90 tablet, Rfl: 3    Current Facility-Administered Medications:     triamcinolone acetonide injection 40 mg, 40 mg, Intramuscular, 1 time in Clinic/HOD, Alessio Avila,     No results found for: HGBA1C  No results found for: MICALBCREAT  Lab Results   Component Value Date    LDLCALC 124.0 12/10/2020    LDLCALC 121.8 08/02/2019    CHOL 215 (H) 12/10/2020    HDL 71 12/10/2020    TRIG 100 12/10/2020       Lab Results   Component Value Date     12/10/2020    K 4.1 12/10/2020     12/10/2020    CO2 29 12/10/2020    GLU 88 12/10/2020    BUN 13 12/10/2020    CREATININE 0.8 12/10/2020    CALCIUM 9.0 12/10/2020    PROT 6.9 12/10/2020    ALBUMIN 3.8 12/10/2020    BILITOT 0.5 12/10/2020    ALKPHOS 87 12/10/2020    AST 28 12/10/2020    ALT 31 12/10/2020    ANIONGAP 9 12/10/2020    ESTGFRAFRICA >60.0 12/10/2020    EGFRNONAA >60.0 12/10/2020    WBC 4.33 12/10/2020    HGB 13.7 12/10/2020    HGB 13.6 08/02/2019    HCT 41.4 12/10/2020    MCV 92 12/10/2020     12/10/2020    TSH 0.819 12/10/2020    HEPCAB Negative 05/18/2017       Lab Results   Component Value Date    FSH 6.2 01/12/2004    PROGESTERONE 16.3 (H) 02/25/2004    ESTRADIOL 110 01/12/2004    [id number] 16 (L) 08/19/2019    [id number] 18 (L) 12/17/2014         Past Medical History:   Diagnosis Date    Acne     Dermatologist prescribes spironolactone     Anxiety     celexa 40    Chronic constipation 5/24/2017     "History of patellar fracture 7/17/2019    L, slipped at home    Hot flashes due to menopause 5/24/2017    Other osteoporosis without current pathological fracture 05/24/2017    Reclast with Rheum, ankle & patella fracture, mom with dx    Primary osteoarthritis of left hip 05/24/2017    Dr Garay, dry needling at [facility] helping, MRI 2016, Injection didn't help much    Right elbow pain 07/20/2017    [facility]    Vitamin D deficiency 8/19/2019    Dr Blas started 50,000 q 7d in August 2019     Past Surgical History:   Procedure Laterality Date    COLONOSCOPY N/A 4/8/2019    Procedure: COLONOSCOPY;  Surgeon: Rakesh Parmar MD;  Location: [facility] (4TH FLR);  Service: Endoscopy;  Laterality: N/A;    COLONOSCOPY N/A 6/13/2019    Procedure: COLONOSCOPY - Device-assisted;  Surgeon: Sravan Haji MD;  Location: [facility] ENDO (2ND FLR);  Service: Endoscopy;  Laterality: N/A;  Single-balloon scope    right hip surgery      April 24, 2012     Social History     Social History Narrative     with EMERITA (works with HIV, Hepatitis C), 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[employer], partner [facility]a [employer] [place]    dtr Faby [employer], 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   HENT:      Head: Normocephalic and atraumatic.      Nose:      Comments: Wearing mask due to current COVID 19 pandemic, Nose & mouth exam deferred  Eyes:      Pupils: Pupils are equal, round, and reactive to light.   Neck:      Musculoskeletal: " Neck supple.      Thyroid: No thyromegaly.   Cardiovascular:      Rate and Rhythm: Normal rate and regular rhythm.      Heart sounds: Normal heart sounds. No murmur.   Pulmonary:      Effort: Pulmonary effort is normal.      Breath sounds: Normal breath sounds. No wheezing.   Abdominal:      General: Bowel sounds are normal. There is no distension.      Palpations: Abdomen is soft. There is no mass.      Tenderness: There is no abdominal tenderness. There is no guarding or rebound.   Lymphadenopathy:      Cervical: No cervical adenopathy.   Skin:     General: Skin is warm and dry.   Neurological:      Mental Status: She is alert and oriented to person, place, and time.   Psychiatric:         Behavior: Behavior normal.       Assessment:     1. Routine general medical examination at a health care facility    2. Other osteoporosis without current pathological fracture    3. Anxiety    4. Other screening mammogram      Plan:     Orders Placed This Encounter    Mammo Digital Screening Bilat       She will schedule Reclast after the Pandemic    Follow with GYN for female health & cancer prevention    Due for  Vaccines  - at your pharmacy      ==============================  RECOMMENDATIONS FOR FEMALES  ==============================  Your #1 MEDICINE is DAILY EXERCISE - 15-20 minutes of huffing & puffing EVERY DAY.     Prevent the #1 cause of death- cardiovascular disease (HEART ATTACK & STROKE) by checking for normal blood pressure, cholesterol, sugars, & by not smoking.     VACCINES: Yearly FLU shot, PNEUMONIA shot after 65,  SHINGLES shot after 50    Screening colonoscopy at AGE  50 & every 10 years to check for COLON CANCER,  one of the most common & preventable cancers (Or FIT kit yearly) Repeat in 3 years if POLYP found     I recommend  high fiber (5 fresh fruits or vegetables daily), low fat diet and aerobic  exercise (huffing/ puffing/ sweating for 20 min straight at least 4 days a week)    Follow up yearly with  mammogram, fasting lipids, CMP, CBC prior.   ==============================================================      There are no Patient Instructions on file for this visit.

## 2020-12-22 ENCOUNTER — OFFICE VISIT (OUTPATIENT)
Dept: URGENT CARE | Facility: CLINIC | Age: 55
End: 2020-12-22
Payer: COMMERCIAL

## 2020-12-22 VITALS
SYSTOLIC BLOOD PRESSURE: 121 MMHG | WEIGHT: 153 LBS | RESPIRATION RATE: 18 BRPM | BODY MASS INDEX: 26.12 KG/M2 | HEIGHT: 64 IN | DIASTOLIC BLOOD PRESSURE: 81 MMHG | TEMPERATURE: 97 F | OXYGEN SATURATION: 96 % | HEART RATE: 67 BPM

## 2020-12-22 DIAGNOSIS — R53.83 FATIGUE, UNSPECIFIED TYPE: Primary | ICD-10-CM

## 2020-12-22 LAB
CTP QC/QA: YES
SARS-COV-2 RDRP RESP QL NAA+PROBE: NEGATIVE

## 2020-12-22 PROCEDURE — 99213 OFFICE O/P EST LOW 20 MIN: CPT | Mod: S$GLB,,, | Performed by: STUDENT IN AN ORGANIZED HEALTH CARE EDUCATION/TRAINING PROGRAM

## 2020-12-22 PROCEDURE — U0002: ICD-10-PCS | Mod: QW,S$GLB,, | Performed by: STUDENT IN AN ORGANIZED HEALTH CARE EDUCATION/TRAINING PROGRAM

## 2020-12-22 PROCEDURE — U0002 COVID-19 LAB TEST NON-CDC: HCPCS | Mod: QW,S$GLB,, | Performed by: STUDENT IN AN ORGANIZED HEALTH CARE EDUCATION/TRAINING PROGRAM

## 2020-12-22 PROCEDURE — 3008F PR BODY MASS INDEX (BMI) DOCUMENTED: ICD-10-PCS | Mod: CPTII,S$GLB,, | Performed by: STUDENT IN AN ORGANIZED HEALTH CARE EDUCATION/TRAINING PROGRAM

## 2020-12-22 PROCEDURE — 3008F BODY MASS INDEX DOCD: CPT | Mod: CPTII,S$GLB,, | Performed by: STUDENT IN AN ORGANIZED HEALTH CARE EDUCATION/TRAINING PROGRAM

## 2020-12-22 PROCEDURE — 99213 PR OFFICE/OUTPT VISIT, EST, LEVL III, 20-29 MIN: ICD-10-PCS | Mod: S$GLB,,, | Performed by: STUDENT IN AN ORGANIZED HEALTH CARE EDUCATION/TRAINING PROGRAM

## 2020-12-22 NOTE — PROGRESS NOTES
"Subjective:       Patient ID: Grace Beltran is a 55 y.o. female.    Vitals:  height is 5' 4" (1.626 m) and weight is 69.4 kg (153 lb). Her temperature is 97.2 °F (36.2 °C). Her blood pressure is 121/81 and her pulse is 67. Her respiration is 18 and oxygen saturation is 96%.     Chief Complaint: COVID-19 Concerns    Fatigue  This is a new problem. The current episode started in the past 7 days. Associated symptoms include fatigue. Pertinent negatives include no abdominal pain, chest pain, chills, congestion, coughing, diaphoresis, fever, headaches, myalgias, nausea, rash, sore throat or vomiting. Associated symptoms comments: Direct exposure. Nothing aggravates the symptoms. She has tried nothing for the symptoms.       Constitution: Positive for fatigue. Negative for chills, sweating and fever.   HENT: Negative for ear pain, congestion, sinus pain, sinus pressure, sore throat and voice change.    Neck: Negative for painful lymph nodes.   Cardiovascular: Negative for chest pain.   Eyes: Negative for eye redness.   Respiratory: Negative for chest tightness, cough, sputum production, bloody sputum, COPD, shortness of breath, stridor, wheezing and asthma.    Gastrointestinal: Negative for abdominal pain, nausea and vomiting.   Musculoskeletal: Negative for muscle ache.   Skin: Negative for rash.   Allergic/Immunologic: Negative for seasonal allergies and asthma.   Neurological: Negative for headaches.   Hematologic/Lymphatic: Negative for swollen lymph nodes.       Objective:      Physical Exam   Constitutional: She is oriented to person, place, and time. No distress.   HENT:   Head: Normocephalic.   Eyes: Conjunctivae are normal.   Pulmonary/Chest: Effort normal. No respiratory distress.   Abdominal: Normal appearance.   Neurological: She is alert and oriented to person, place, and time. Coordination normal.   Skin: Skin is not diaphoretic. Psychiatric: Her behavior is normal. Mood normal.   Nursing note and " vitals reviewed.        Assessment:       1. Fatigue, unspecified type        Plan:         Fatigue, unspecified type  -     POCT COVID-19 Rapid Screening      Vitals stable. No evidence of respiratory distress noted, able to speak in complete sentences without pause.    Requesting COVID-19 testing post exposure and given symptoms.   Tested for COVID-19 today. Rapid test was Negative. Educated on COVID-19 and COVID-19 testing. Advised on COVID-19 precautions. Discussed supportive care and OTC meds for symptom relief. Advised on return/follow-up precautions. Advised on ER precautions. Answered all patient questions. Patient verbalized understanding and voiced agreement with current treatment plan.

## 2021-01-05 ENCOUNTER — HOSPITAL ENCOUNTER (OUTPATIENT)
Dept: RADIOLOGY | Facility: HOSPITAL | Age: 56
Discharge: HOME | End: 2021-01-05
Attending: FAMILY MEDICINE
Payer: COMMERCIAL

## 2021-01-05 DIAGNOSIS — Z12.31 OTHER SCREENING MAMMOGRAM: ICD-10-CM

## 2021-01-05 PROCEDURE — 77063 BREAST TOMOSYNTHESIS BI: CPT | Mod: 26,,, | Performed by: RADIOLOGY

## 2021-01-05 PROCEDURE — 77063 MAMMO DIGITAL SCREENING BILAT WITH TOMO: ICD-10-PCS | Mod: 26,,, | Performed by: RADIOLOGY

## 2021-01-05 PROCEDURE — 77067 SCR MAMMO BI INCL CAD: CPT | Mod: TC,PN

## 2021-01-05 PROCEDURE — 77067 SCR MAMMO BI INCL CAD: CPT | Mod: 26,,, | Performed by: RADIOLOGY

## 2021-01-05 PROCEDURE — 77067 MAMMO DIGITAL SCREENING BILAT WITH TOMO: ICD-10-PCS | Mod: 26,,, | Performed by: RADIOLOGY

## 2021-01-19 ENCOUNTER — CLINICAL SUPPORT (OUTPATIENT)
Dept: URGENT CARE | Facility: CLINIC | Age: 56
End: 2021-01-19
Payer: COMMERCIAL

## 2021-01-19 DIAGNOSIS — Z11.59 SCREENING FOR VIRAL DISEASE: Primary | ICD-10-CM

## 2021-01-19 LAB
CTP QC/QA: YES
SARS-COV-2 RDRP RESP QL NAA+PROBE: NEGATIVE

## 2021-01-19 PROCEDURE — U0002 COVID-19 LAB TEST NON-CDC: HCPCS | Mod: QW,S$GLB,, | Performed by: FAMILY MEDICINE

## 2021-01-19 PROCEDURE — U0002: ICD-10-PCS | Mod: QW,S$GLB,, | Performed by: FAMILY MEDICINE

## 2021-01-25 ENCOUNTER — CLINICAL SUPPORT (OUTPATIENT)
Dept: URGENT CARE | Facility: CLINIC | Age: 56
End: 2021-01-25
Payer: COMMERCIAL

## 2021-01-25 VITALS — HEART RATE: 98 BPM | OXYGEN SATURATION: 95 % | TEMPERATURE: 98 F | RESPIRATION RATE: 16 BRPM

## 2021-01-25 DIAGNOSIS — Z11.59 ENCOUNTER FOR SCREENING FOR OTHER VIRAL DISEASES: Primary | ICD-10-CM

## 2021-01-25 LAB
CTP QC/QA: YES
SARS-COV-2 RDRP RESP QL NAA+PROBE: NEGATIVE

## 2021-01-25 PROCEDURE — U0002: ICD-10-PCS | Mod: QW,S$GLB,, | Performed by: FAMILY MEDICINE

## 2021-01-25 PROCEDURE — U0002 COVID-19 LAB TEST NON-CDC: HCPCS | Mod: QW,S$GLB,, | Performed by: FAMILY MEDICINE

## 2021-02-10 ENCOUNTER — CLINICAL SUPPORT (OUTPATIENT)
Dept: URGENT CARE | Facility: CLINIC | Age: 56
End: 2021-02-10
Payer: COMMERCIAL

## 2021-02-10 DIAGNOSIS — Z13.9 ENCOUNTER FOR SCREENING: Primary | ICD-10-CM

## 2021-02-10 LAB
CTP QC/QA: YES
SARS-COV-2 RDRP RESP QL NAA+PROBE: NEGATIVE

## 2021-02-10 PROCEDURE — U0002 COVID-19 LAB TEST NON-CDC: HCPCS | Mod: QW,S$GLB,, | Performed by: NURSE PRACTITIONER

## 2021-02-10 PROCEDURE — U0002: ICD-10-PCS | Mod: QW,S$GLB,, | Performed by: NURSE PRACTITIONER

## 2021-02-23 ENCOUNTER — PATIENT MESSAGE (OUTPATIENT)
Dept: RHEUMATOLOGY | Facility: CLINIC | Age: 56
End: 2021-02-23

## 2021-03-01 ENCOUNTER — NURSE TRIAGE (OUTPATIENT)
Dept: ADMINISTRATIVE | Facility: CLINIC | Age: 56
End: 2021-03-01

## 2021-03-01 ENCOUNTER — HOSPITAL ENCOUNTER (EMERGENCY)
Facility: HOSPITAL | Age: 56
Discharge: HOME | End: 2021-03-01
Attending: EMERGENCY MEDICINE
Payer: COMMERCIAL

## 2021-03-01 VITALS
TEMPERATURE: 98 F | RESPIRATION RATE: 18 BRPM | SYSTOLIC BLOOD PRESSURE: 140 MMHG | OXYGEN SATURATION: 98 % | DIASTOLIC BLOOD PRESSURE: 109 MMHG | HEART RATE: 83 BPM

## 2021-03-01 DIAGNOSIS — S06.0X0A CONCUSSION WITHOUT LOSS OF CONSCIOUSNESS, INITIAL ENCOUNTER: Primary | ICD-10-CM

## 2021-03-01 PROCEDURE — 99284 EMERGENCY DEPT VISIT MOD MDM: CPT | Mod: 25

## 2021-03-01 PROCEDURE — 99284 EMERGENCY DEPT VISIT MOD MDM: CPT | Mod: ,,, | Performed by: EMERGENCY MEDICINE

## 2021-03-01 PROCEDURE — 99284 PR EMERGENCY DEPT VISIT,LEVEL IV: ICD-10-PCS | Mod: ,,, | Performed by: EMERGENCY MEDICINE

## 2021-04-30 ENCOUNTER — TELEPHONE (OUTPATIENT)
Dept: GASTROENTEROLOGY | Facility: CLINIC | Age: 56
End: 2021-04-30

## 2021-04-30 ENCOUNTER — OFFICE VISIT (OUTPATIENT)
Dept: GASTROENTEROLOGY | Facility: CLINIC | Age: 56
End: 2021-04-30
Payer: COMMERCIAL

## 2021-04-30 VITALS — HEIGHT: 64 IN | BODY MASS INDEX: 26.12 KG/M2 | WEIGHT: 153 LBS

## 2021-04-30 DIAGNOSIS — Z87.39 HISTORY OF BACK PAIN: ICD-10-CM

## 2021-04-30 DIAGNOSIS — K59.09 CHRONIC CONSTIPATION: Primary | ICD-10-CM

## 2021-04-30 DIAGNOSIS — R19.5 CHANGE IN STOOL CALIBER: ICD-10-CM

## 2021-04-30 DIAGNOSIS — M53.3 SACRAL PAIN: ICD-10-CM

## 2021-04-30 PROCEDURE — 1126F AMNT PAIN NOTED NONE PRSNT: CPT | Mod: ,,, | Performed by: NURSE PRACTITIONER

## 2021-04-30 PROCEDURE — 1126F PR PAIN SEVERITY QUANTIFIED, NO PAIN PRESENT: ICD-10-PCS | Mod: ,,, | Performed by: NURSE PRACTITIONER

## 2021-04-30 PROCEDURE — 99214 PR OFFICE/OUTPT VISIT, EST, LEVL IV, 30-39 MIN: ICD-10-PCS | Mod: 95,,, | Performed by: NURSE PRACTITIONER

## 2021-04-30 PROCEDURE — 3008F BODY MASS INDEX DOCD: CPT | Mod: CPTII,,, | Performed by: NURSE PRACTITIONER

## 2021-04-30 PROCEDURE — 99214 OFFICE O/P EST MOD 30 MIN: CPT | Mod: 95,,, | Performed by: NURSE PRACTITIONER

## 2021-04-30 PROCEDURE — 3008F PR BODY MASS INDEX (BMI) DOCUMENTED: ICD-10-PCS | Mod: CPTII,,, | Performed by: NURSE PRACTITIONER

## 2021-04-30 RX ORDER — POLYETHYLENE GLYCOL 3350 17 G/17G
17 POWDER, FOR SOLUTION ORAL DAILY
Qty: 510 G | Refills: 2 | Status: SHIPPED | OUTPATIENT
Start: 2021-04-30 | End: 2021-05-30

## 2021-05-03 ENCOUNTER — HOSPITAL ENCOUNTER (OUTPATIENT)
Dept: RADIOLOGY | Facility: HOSPITAL | Age: 56
Discharge: HOME | End: 2021-05-03
Attending: NURSE PRACTITIONER
Payer: COMMERCIAL

## 2021-05-03 DIAGNOSIS — K59.09 CHRONIC CONSTIPATION: ICD-10-CM

## 2021-05-03 DIAGNOSIS — R19.5 CHANGE IN STOOL CALIBER: ICD-10-CM

## 2021-05-03 PROCEDURE — 74019 RADEX ABDOMEN 2 VIEWS: CPT | Mod: TC,PN

## 2021-05-03 PROCEDURE — 74019 RADEX ABDOMEN 2 VIEWS: CPT | Mod: 26,,, | Performed by: RADIOLOGY

## 2021-05-03 PROCEDURE — 74019 XR ABDOMEN FLAT AND ERECT: ICD-10-PCS | Mod: 26,,, | Performed by: RADIOLOGY

## 2021-05-06 ENCOUNTER — TELEPHONE (OUTPATIENT)
Dept: GASTROENTEROLOGY | Facility: CLINIC | Age: 56
End: 2021-05-06

## 2021-07-07 ENCOUNTER — PATIENT MESSAGE (OUTPATIENT)
Dept: ADMINISTRATIVE | Facility: HOSPITAL | Age: 56
End: 2021-07-07

## 2021-07-13 ENCOUNTER — OFFICE VISIT (OUTPATIENT)
Dept: URGENT CARE | Facility: CLINIC | Age: 56
End: 2021-07-13
Payer: COMMERCIAL

## 2021-07-13 VITALS
WEIGHT: 153 LBS | BODY MASS INDEX: 26.12 KG/M2 | TEMPERATURE: 102 F | RESPIRATION RATE: 18 BRPM | SYSTOLIC BLOOD PRESSURE: 124 MMHG | HEART RATE: 88 BPM | OXYGEN SATURATION: 98 % | DIASTOLIC BLOOD PRESSURE: 83 MMHG | HEIGHT: 64 IN

## 2021-07-13 DIAGNOSIS — U07.1 COVID-19 VIRUS DETECTED: ICD-10-CM

## 2021-07-13 DIAGNOSIS — U07.1 COVID-19: Primary | ICD-10-CM

## 2021-07-13 DIAGNOSIS — R50.9 FEVER, UNSPECIFIED FEVER CAUSE: ICD-10-CM

## 2021-07-13 LAB
CTP QC/QA: YES
SARS-COV-2 RDRP RESP QL NAA+PROBE: POSITIVE

## 2021-07-13 PROCEDURE — U0002 COVID-19 LAB TEST NON-CDC: HCPCS | Mod: QW,S$GLB,, | Performed by: NURSE PRACTITIONER

## 2021-07-13 PROCEDURE — 99213 PR OFFICE/OUTPT VISIT, EST, LEVL III, 20-29 MIN: ICD-10-PCS | Mod: S$GLB,,, | Performed by: NURSE PRACTITIONER

## 2021-07-13 PROCEDURE — 99213 OFFICE O/P EST LOW 20 MIN: CPT | Mod: S$GLB,,, | Performed by: NURSE PRACTITIONER

## 2021-07-13 PROCEDURE — U0002: ICD-10-PCS | Mod: QW,S$GLB,, | Performed by: NURSE PRACTITIONER

## 2021-07-13 PROCEDURE — 3008F PR BODY MASS INDEX (BMI) DOCUMENTED: ICD-10-PCS | Mod: CPTII,S$GLB,, | Performed by: NURSE PRACTITIONER

## 2021-07-13 PROCEDURE — 3008F BODY MASS INDEX DOCD: CPT | Mod: CPTII,S$GLB,, | Performed by: NURSE PRACTITIONER

## 2021-07-13 RX ORDER — ACETAMINOPHEN 500 MG
1000 TABLET ORAL
Status: COMPLETED | OUTPATIENT
Start: 2021-07-13 | End: 2021-07-13

## 2021-07-13 RX ADMIN — Medication 1000 MG: at 08:07

## 2021-07-15 ENCOUNTER — NURSE TRIAGE (OUTPATIENT)
Dept: ADMINISTRATIVE | Facility: CLINIC | Age: 56
End: 2021-07-15

## 2021-07-19 ENCOUNTER — INFUSION (OUTPATIENT)
Dept: INFECTIOUS DISEASES | Facility: HOSPITAL | Age: 56
End: 2021-07-19
Attending: NURSE PRACTITIONER
Payer: COMMERCIAL

## 2021-07-19 VITALS
RESPIRATION RATE: 16 BRPM | TEMPERATURE: 98 F | HEART RATE: 72 BPM | SYSTOLIC BLOOD PRESSURE: 123 MMHG | OXYGEN SATURATION: 96 % | DIASTOLIC BLOOD PRESSURE: 67 MMHG

## 2021-07-19 DIAGNOSIS — U07.1 COVID-19: Primary | ICD-10-CM

## 2021-07-19 PROCEDURE — 25000003 PHARM REV CODE 250: Performed by: INTERNAL MEDICINE

## 2021-07-19 PROCEDURE — M0243 CASIRIVI AND IMDEVI INFUSION: HCPCS | Performed by: INTERNAL MEDICINE

## 2021-07-19 PROCEDURE — 63600175 PHARM REV CODE 636 W HCPCS: Performed by: INTERNAL MEDICINE

## 2021-07-19 RX ORDER — SODIUM CHLORIDE 0.9 % (FLUSH) 0.9 %
10 SYRINGE (ML) INJECTION
Status: DISCONTINUED | OUTPATIENT
Start: 2021-07-19 | End: 2022-02-17

## 2021-07-19 RX ORDER — ACETAMINOPHEN 325 MG/1
650 TABLET ORAL ONCE AS NEEDED
Status: DISCONTINUED | OUTPATIENT
Start: 2021-07-19 | End: 2022-02-17

## 2021-07-19 RX ORDER — DIPHENHYDRAMINE HYDROCHLORIDE 50 MG/ML
25 INJECTION INTRAMUSCULAR; INTRAVENOUS ONCE AS NEEDED
Status: DISCONTINUED | OUTPATIENT
Start: 2021-07-19 | End: 2022-02-17

## 2021-07-19 RX ORDER — ALBUTEROL SULFATE 90 UG/1
2 AEROSOL, METERED RESPIRATORY (INHALATION)
Status: DISCONTINUED | OUTPATIENT
Start: 2021-07-19 | End: 2022-02-17

## 2021-07-19 RX ORDER — EPINEPHRINE 0.3 MG/.3ML
0.3 INJECTION SUBCUTANEOUS
Status: DISCONTINUED | OUTPATIENT
Start: 2021-07-19 | End: 2022-02-17

## 2021-07-19 RX ORDER — ONDANSETRON 4 MG/1
4 TABLET, ORALLY DISINTEGRATING ORAL ONCE AS NEEDED
Status: DISCONTINUED | OUTPATIENT
Start: 2021-07-19 | End: 2022-02-17

## 2021-07-19 RX ADMIN — CASIRIVIMAB 600 MG: 1332 INJECTION, SOLUTION, CONCENTRATE INTRAVENOUS at 10:07

## 2021-08-09 ENCOUNTER — TELEPHONE (OUTPATIENT)
Dept: DERMATOLOGY | Facility: CLINIC | Age: 56
End: 2021-08-09

## 2021-08-09 ENCOUNTER — OFFICE VISIT (OUTPATIENT)
Dept: DERMATOLOGY | Facility: CLINIC | Age: 56
End: 2021-08-09
Payer: COMMERCIAL

## 2021-08-09 DIAGNOSIS — D22.9 MULTIPLE BENIGN NEVI: ICD-10-CM

## 2021-08-09 DIAGNOSIS — L71.9 ROSACEA: ICD-10-CM

## 2021-08-09 DIAGNOSIS — D18.01 CHERRY ANGIOMA: ICD-10-CM

## 2021-08-09 DIAGNOSIS — L82.1 SEBORRHEIC KERATOSES: ICD-10-CM

## 2021-08-09 DIAGNOSIS — L21.9 SEBORRHEIC DERMATITIS: Primary | ICD-10-CM

## 2021-08-09 DIAGNOSIS — Z12.83 SCREENING EXAM FOR SKIN CANCER: ICD-10-CM

## 2021-08-09 DIAGNOSIS — L81.4 LENTIGO: ICD-10-CM

## 2021-08-09 PROCEDURE — 1126F AMNT PAIN NOTED NONE PRSNT: CPT | Mod: CPTII,S$GLB,, | Performed by: DERMATOLOGY

## 2021-08-09 PROCEDURE — 1126F PR PAIN SEVERITY QUANTIFIED, NO PAIN PRESENT: ICD-10-PCS | Mod: CPTII,S$GLB,, | Performed by: DERMATOLOGY

## 2021-08-09 PROCEDURE — 1159F PR MEDICATION LIST DOCUMENTED IN MEDICAL RECORD: ICD-10-PCS | Mod: CPTII,S$GLB,, | Performed by: DERMATOLOGY

## 2021-08-09 PROCEDURE — 99204 PR OFFICE/OUTPT VISIT, NEW, LEVL IV, 45-59 MIN: ICD-10-PCS | Mod: S$GLB,,, | Performed by: DERMATOLOGY

## 2021-08-09 PROCEDURE — 99999 PR PBB SHADOW E&M-EST. PATIENT-LVL III: CPT | Mod: PBBFAC,,, | Performed by: DERMATOLOGY

## 2021-08-09 PROCEDURE — 1160F PR REVIEW ALL MEDS BY PRESCRIBER/CLIN PHARMACIST DOCUMENTED: ICD-10-PCS | Mod: CPTII,S$GLB,, | Performed by: DERMATOLOGY

## 2021-08-09 PROCEDURE — 1160F RVW MEDS BY RX/DR IN RCRD: CPT | Mod: CPTII,S$GLB,, | Performed by: DERMATOLOGY

## 2021-08-09 PROCEDURE — 99204 OFFICE O/P NEW MOD 45 MIN: CPT | Mod: S$GLB,,, | Performed by: DERMATOLOGY

## 2021-08-09 PROCEDURE — 1159F MED LIST DOCD IN RCRD: CPT | Mod: CPTII,S$GLB,, | Performed by: DERMATOLOGY

## 2021-08-09 PROCEDURE — 99999 PR PBB SHADOW E&M-EST. PATIENT-LVL III: ICD-10-PCS | Mod: PBBFAC,,, | Performed by: DERMATOLOGY

## 2021-08-09 RX ORDER — METRONIDAZOLE 10 MG/G
GEL TOPICAL
Qty: 60 G | Refills: 6 | Status: SHIPPED | OUTPATIENT
Start: 2021-08-09 | End: 2021-11-19

## 2021-08-09 RX ORDER — KETOCONAZOLE 20 MG/G
CREAM TOPICAL
Qty: 60 G | Refills: 3 | Status: SHIPPED | OUTPATIENT
Start: 2021-08-09 | End: 2021-11-19

## 2021-08-09 RX ORDER — KETOCONAZOLE 20 MG/ML
SHAMPOO, SUSPENSION TOPICAL
Qty: 120 ML | Refills: 5 | Status: SHIPPED | OUTPATIENT
Start: 2021-08-09 | End: 2021-11-19

## 2021-08-09 RX ORDER — DOXYCYCLINE HYCLATE 50 MG/1
50 CAPSULE ORAL 2 TIMES DAILY
Qty: 60 CAPSULE | Refills: 0 | Status: SHIPPED | OUTPATIENT
Start: 2021-08-09 | End: 2021-09-08

## 2021-10-05 ENCOUNTER — PATIENT MESSAGE (OUTPATIENT)
Dept: ADMINISTRATIVE | Facility: HOSPITAL | Age: 56
End: 2021-10-05

## 2021-10-08 ENCOUNTER — PATIENT MESSAGE (OUTPATIENT)
Dept: PRIMARY CARE CLINIC | Facility: CLINIC | Age: 56
End: 2021-10-08

## 2021-10-08 DIAGNOSIS — Z00.00 ANNUAL PHYSICAL EXAM: Primary | ICD-10-CM

## 2021-11-02 ENCOUNTER — OFFICE VISIT (OUTPATIENT)
Dept: OBSTETRICS AND GYNECOLOGY | Facility: CLINIC | Age: 56
End: 2021-11-02
Payer: COMMERCIAL

## 2021-11-02 VITALS
BODY MASS INDEX: 25.6 KG/M2 | HEIGHT: 64 IN | WEIGHT: 149.94 LBS | SYSTOLIC BLOOD PRESSURE: 110 MMHG | DIASTOLIC BLOOD PRESSURE: 60 MMHG

## 2021-11-02 DIAGNOSIS — Z01.419 ENCOUNTER FOR ANNUAL ROUTINE GYNECOLOGICAL EXAMINATION: Primary | ICD-10-CM

## 2021-11-02 DIAGNOSIS — Z12.4 SCREENING FOR MALIGNANT NEOPLASM OF CERVIX: ICD-10-CM

## 2021-11-02 PROCEDURE — 3008F PR BODY MASS INDEX (BMI) DOCUMENTED: ICD-10-PCS | Mod: CPTII,S$GLB,, | Performed by: NURSE PRACTITIONER

## 2021-11-02 PROCEDURE — 3074F SYST BP LT 130 MM HG: CPT | Mod: CPTII,S$GLB,, | Performed by: NURSE PRACTITIONER

## 2021-11-02 PROCEDURE — 3008F BODY MASS INDEX DOCD: CPT | Mod: CPTII,S$GLB,, | Performed by: NURSE PRACTITIONER

## 2021-11-02 PROCEDURE — 3074F PR MOST RECENT SYSTOLIC BLOOD PRESSURE < 130 MM HG: ICD-10-PCS | Mod: CPTII,S$GLB,, | Performed by: NURSE PRACTITIONER

## 2021-11-02 PROCEDURE — 99999 PR PBB SHADOW E&M-EST. PATIENT-LVL III: ICD-10-PCS | Mod: PBBFAC,,, | Performed by: NURSE PRACTITIONER

## 2021-11-02 PROCEDURE — 1159F PR MEDICATION LIST DOCUMENTED IN MEDICAL RECORD: ICD-10-PCS | Mod: CPTII,S$GLB,, | Performed by: NURSE PRACTITIONER

## 2021-11-02 PROCEDURE — 3078F DIAST BP <80 MM HG: CPT | Mod: CPTII,S$GLB,, | Performed by: NURSE PRACTITIONER

## 2021-11-02 PROCEDURE — 99999 PR PBB SHADOW E&M-EST. PATIENT-LVL III: CPT | Mod: PBBFAC,,, | Performed by: NURSE PRACTITIONER

## 2021-11-02 PROCEDURE — 1160F RVW MEDS BY RX/DR IN RCRD: CPT | Mod: CPTII,S$GLB,, | Performed by: NURSE PRACTITIONER

## 2021-11-02 PROCEDURE — 99386 PR PREVENTIVE VISIT,NEW,40-64: ICD-10-PCS | Mod: S$GLB,,, | Performed by: NURSE PRACTITIONER

## 2021-11-02 PROCEDURE — 88175 CYTOPATH C/V AUTO FLUID REDO: CPT | Performed by: NURSE PRACTITIONER

## 2021-11-02 PROCEDURE — 3078F PR MOST RECENT DIASTOLIC BLOOD PRESSURE < 80 MM HG: ICD-10-PCS | Mod: CPTII,S$GLB,, | Performed by: NURSE PRACTITIONER

## 2021-11-02 PROCEDURE — 87624 HPV HI-RISK TYP POOLED RSLT: CPT | Performed by: NURSE PRACTITIONER

## 2021-11-02 PROCEDURE — 1159F MED LIST DOCD IN RCRD: CPT | Mod: CPTII,S$GLB,, | Performed by: NURSE PRACTITIONER

## 2021-11-02 PROCEDURE — 99386 PREV VISIT NEW AGE 40-64: CPT | Mod: S$GLB,,, | Performed by: NURSE PRACTITIONER

## 2021-11-02 PROCEDURE — 1160F PR REVIEW ALL MEDS BY PRESCRIBER/CLIN PHARMACIST DOCUMENTED: ICD-10-PCS | Mod: CPTII,S$GLB,, | Performed by: NURSE PRACTITIONER

## 2021-11-08 LAB
FINAL PATHOLOGIC DIAGNOSIS: NORMAL
Lab: NORMAL

## 2021-11-09 LAB
HPV HR 12 DNA SPEC QL NAA+PROBE: NEGATIVE
HPV16 AG SPEC QL: NEGATIVE
HPV18 DNA SPEC QL NAA+PROBE: NEGATIVE

## 2021-11-19 ENCOUNTER — OFFICE VISIT (OUTPATIENT)
Dept: ENDOCRINOLOGY | Facility: CLINIC | Age: 56
End: 2021-11-19
Payer: COMMERCIAL

## 2021-11-19 VITALS
HEIGHT: 64 IN | SYSTOLIC BLOOD PRESSURE: 126 MMHG | HEART RATE: 70 BPM | DIASTOLIC BLOOD PRESSURE: 85 MMHG | BODY MASS INDEX: 25.82 KG/M2 | TEMPERATURE: 98 F | WEIGHT: 151.25 LBS

## 2021-11-19 DIAGNOSIS — M81.8 OTHER OSTEOPOROSIS WITHOUT CURRENT PATHOLOGICAL FRACTURE: Primary | ICD-10-CM

## 2021-11-19 DIAGNOSIS — E55.9 VITAMIN D DEFICIENCY: ICD-10-CM

## 2021-11-19 PROCEDURE — 1159F MED LIST DOCD IN RCRD: CPT | Mod: CPTII,S$GLB,, | Performed by: HOSPITALIST

## 2021-11-19 PROCEDURE — 3079F PR MOST RECENT DIASTOLIC BLOOD PRESSURE 80-89 MM HG: ICD-10-PCS | Mod: CPTII,S$GLB,, | Performed by: HOSPITALIST

## 2021-11-19 PROCEDURE — 1159F PR MEDICATION LIST DOCUMENTED IN MEDICAL RECORD: ICD-10-PCS | Mod: CPTII,S$GLB,, | Performed by: HOSPITALIST

## 2021-11-19 PROCEDURE — 99999 PR PBB SHADOW E&M-EST. PATIENT-LVL IV: CPT | Mod: PBBFAC,,, | Performed by: HOSPITALIST

## 2021-11-19 PROCEDURE — 1160F PR REVIEW ALL MEDS BY PRESCRIBER/CLIN PHARMACIST DOCUMENTED: ICD-10-PCS | Mod: CPTII,S$GLB,, | Performed by: HOSPITALIST

## 2021-11-19 PROCEDURE — 99999 PR PBB SHADOW E&M-EST. PATIENT-LVL IV: ICD-10-PCS | Mod: PBBFAC,,, | Performed by: HOSPITALIST

## 2021-11-19 PROCEDURE — 3079F DIAST BP 80-89 MM HG: CPT | Mod: CPTII,S$GLB,, | Performed by: HOSPITALIST

## 2021-11-19 PROCEDURE — 3074F PR MOST RECENT SYSTOLIC BLOOD PRESSURE < 130 MM HG: ICD-10-PCS | Mod: CPTII,S$GLB,, | Performed by: HOSPITALIST

## 2021-11-19 PROCEDURE — 3008F BODY MASS INDEX DOCD: CPT | Mod: CPTII,S$GLB,, | Performed by: HOSPITALIST

## 2021-11-19 PROCEDURE — 99214 PR OFFICE/OUTPT VISIT, EST, LEVL IV, 30-39 MIN: ICD-10-PCS | Mod: S$GLB,,, | Performed by: HOSPITALIST

## 2021-11-19 PROCEDURE — 3008F PR BODY MASS INDEX (BMI) DOCUMENTED: ICD-10-PCS | Mod: CPTII,S$GLB,, | Performed by: HOSPITALIST

## 2021-11-19 PROCEDURE — 3074F SYST BP LT 130 MM HG: CPT | Mod: CPTII,S$GLB,, | Performed by: HOSPITALIST

## 2021-11-19 PROCEDURE — 99214 OFFICE O/P EST MOD 30 MIN: CPT | Mod: S$GLB,,, | Performed by: HOSPITALIST

## 2021-11-19 PROCEDURE — 1160F RVW MEDS BY RX/DR IN RCRD: CPT | Mod: CPTII,S$GLB,, | Performed by: HOSPITALIST

## 2022-02-15 ENCOUNTER — LAB VISIT (OUTPATIENT)
Dept: LAB | Facility: HOSPITAL | Age: 57
End: 2022-02-15
Attending: FAMILY MEDICINE
Payer: COMMERCIAL

## 2022-02-15 DIAGNOSIS — Z00.00 ANNUAL PHYSICAL EXAM: ICD-10-CM

## 2022-02-15 LAB
ALBUMIN SERPL BCP-MCNC: 3.9 G/DL (ref 3.5–5.2)
ALP SERPL-CCNC: 90 U/L (ref 55–135)
ALT SERPL W/O P-5'-P-CCNC: 21 U/L (ref 10–44)
ANION GAP SERPL CALC-SCNC: 9 MMOL/L (ref 8–16)
AST SERPL-CCNC: 18 U/L (ref 10–40)
BASOPHILS # BLD AUTO: 0.01 K/UL (ref 0–0.2)
BASOPHILS NFR BLD: 0.3 % (ref 0–1.9)
BILIRUB SERPL-MCNC: 0.4 MG/DL (ref 0.1–1)
BUN SERPL-MCNC: 13 MG/DL (ref 6–20)
CALCIUM SERPL-MCNC: 9.3 MG/DL (ref 8.7–10.5)
CHLORIDE SERPL-SCNC: 102 MMOL/L (ref 95–110)
CHOLEST SERPL-MCNC: 227 MG/DL (ref 120–199)
CHOLEST/HDLC SERPL: 3.1 {RATIO} (ref 2–5)
CO2 SERPL-SCNC: 31 MMOL/L (ref 23–29)
CREAT SERPL-MCNC: 0.8 MG/DL (ref 0.5–1.4)
DIFFERENTIAL METHOD: ABNORMAL
EOSINOPHIL # BLD AUTO: 0.1 K/UL (ref 0–0.5)
EOSINOPHIL NFR BLD: 1.6 % (ref 0–8)
ERYTHROCYTE [DISTWIDTH] IN BLOOD BY AUTOMATED COUNT: 12.2 % (ref 11.5–14.5)
EST. GFR  (AFRICAN AMERICAN): >60 ML/MIN/1.73 M^2
EST. GFR  (NON AFRICAN AMERICAN): >60 ML/MIN/1.73 M^2
GLUCOSE SERPL-MCNC: 86 MG/DL (ref 70–110)
HCT VFR BLD AUTO: 43.2 % (ref 37–48.5)
HDLC SERPL-MCNC: 74 MG/DL (ref 40–75)
HDLC SERPL: 32.6 % (ref 20–50)
HGB BLD-MCNC: 14.2 G/DL (ref 12–16)
IMM GRANULOCYTES # BLD AUTO: 0.01 K/UL (ref 0–0.04)
IMM GRANULOCYTES NFR BLD AUTO: 0.3 % (ref 0–0.5)
LDLC SERPL CALC-MCNC: 131.4 MG/DL (ref 63–159)
LYMPHOCYTES # BLD AUTO: 1.2 K/UL (ref 1–4.8)
LYMPHOCYTES NFR BLD: 32.2 % (ref 18–48)
MCH RBC QN AUTO: 29.5 PG (ref 27–31)
MCHC RBC AUTO-ENTMCNC: 32.9 G/DL (ref 32–36)
MCV RBC AUTO: 90 FL (ref 82–98)
MONOCYTES # BLD AUTO: 0.3 K/UL (ref 0.3–1)
MONOCYTES NFR BLD: 6.9 % (ref 4–15)
NEUTROPHILS # BLD AUTO: 2.2 K/UL (ref 1.8–7.7)
NEUTROPHILS NFR BLD: 58.7 % (ref 38–73)
NONHDLC SERPL-MCNC: 153 MG/DL
NRBC BLD-RTO: 0 /100 WBC
PLATELET # BLD AUTO: 306 K/UL (ref 150–450)
PMV BLD AUTO: 10.1 FL (ref 9.2–12.9)
POTASSIUM SERPL-SCNC: 4 MMOL/L (ref 3.5–5.1)
PROT SERPL-MCNC: 7.1 G/DL (ref 6–8.4)
RBC # BLD AUTO: 4.81 M/UL (ref 4–5.4)
SODIUM SERPL-SCNC: 142 MMOL/L (ref 136–145)
TRIGL SERPL-MCNC: 108 MG/DL (ref 30–150)
WBC # BLD AUTO: 3.76 K/UL (ref 3.9–12.7)

## 2022-02-15 PROCEDURE — 80061 LIPID PANEL: CPT | Performed by: FAMILY MEDICINE

## 2022-02-15 PROCEDURE — 36415 COLL VENOUS BLD VENIPUNCTURE: CPT | Mod: PN | Performed by: FAMILY MEDICINE

## 2022-02-15 PROCEDURE — 80053 COMPREHEN METABOLIC PANEL: CPT | Performed by: FAMILY MEDICINE

## 2022-02-15 PROCEDURE — 85025 COMPLETE CBC W/AUTO DIFF WBC: CPT | Performed by: FAMILY MEDICINE

## 2022-02-17 ENCOUNTER — OFFICE VISIT (OUTPATIENT)
Dept: PRIMARY CARE CLINIC | Facility: CLINIC | Age: 57
End: 2022-02-17
Payer: COMMERCIAL

## 2022-02-17 VITALS
SYSTOLIC BLOOD PRESSURE: 115 MMHG | RESPIRATION RATE: 18 BRPM | OXYGEN SATURATION: 97 % | WEIGHT: 150.56 LBS | TEMPERATURE: 98 F | HEIGHT: 63 IN | BODY MASS INDEX: 26.68 KG/M2 | DIASTOLIC BLOOD PRESSURE: 67 MMHG | HEART RATE: 71 BPM

## 2022-02-17 DIAGNOSIS — M25.552 BILATERAL HIP PAIN: ICD-10-CM

## 2022-02-17 DIAGNOSIS — Z00.00 ANNUAL PHYSICAL EXAM: Primary | ICD-10-CM

## 2022-02-17 DIAGNOSIS — F41.9 ANXIETY: ICD-10-CM

## 2022-02-17 DIAGNOSIS — Z12.31 OTHER SCREENING MAMMOGRAM: ICD-10-CM

## 2022-02-17 DIAGNOSIS — M25.551 BILATERAL HIP PAIN: ICD-10-CM

## 2022-02-17 PROBLEM — U07.1 REAL TIME REVERSE TRANSCRIPTASE PCR POSITIVE FOR COVID-19 VIRUS: Status: RESOLVED | Noted: 2020-08-13 | Resolved: 2022-02-17

## 2022-02-17 PROCEDURE — 3074F PR MOST RECENT SYSTOLIC BLOOD PRESSURE < 130 MM HG: ICD-10-PCS | Mod: CPTII,S$GLB,, | Performed by: FAMILY MEDICINE

## 2022-02-17 PROCEDURE — 99999 PR PBB SHADOW E&M-EST. PATIENT-LVL IV: CPT | Mod: PBBFAC,,, | Performed by: FAMILY MEDICINE

## 2022-02-17 PROCEDURE — 99999 PR PBB SHADOW E&M-EST. PATIENT-LVL IV: ICD-10-PCS | Mod: PBBFAC,,, | Performed by: FAMILY MEDICINE

## 2022-02-17 PROCEDURE — 1160F RVW MEDS BY RX/DR IN RCRD: CPT | Mod: CPTII,S$GLB,, | Performed by: FAMILY MEDICINE

## 2022-02-17 PROCEDURE — 1159F MED LIST DOCD IN RCRD: CPT | Mod: CPTII,S$GLB,, | Performed by: FAMILY MEDICINE

## 2022-02-17 PROCEDURE — 3074F SYST BP LT 130 MM HG: CPT | Mod: CPTII,S$GLB,, | Performed by: FAMILY MEDICINE

## 2022-02-17 PROCEDURE — 1160F PR REVIEW ALL MEDS BY PRESCRIBER/CLIN PHARMACIST DOCUMENTED: ICD-10-PCS | Mod: CPTII,S$GLB,, | Performed by: FAMILY MEDICINE

## 2022-02-17 PROCEDURE — 3078F DIAST BP <80 MM HG: CPT | Mod: CPTII,S$GLB,, | Performed by: FAMILY MEDICINE

## 2022-02-17 PROCEDURE — 3078F PR MOST RECENT DIASTOLIC BLOOD PRESSURE < 80 MM HG: ICD-10-PCS | Mod: CPTII,S$GLB,, | Performed by: FAMILY MEDICINE

## 2022-02-17 PROCEDURE — 3008F PR BODY MASS INDEX (BMI) DOCUMENTED: ICD-10-PCS | Mod: CPTII,S$GLB,, | Performed by: FAMILY MEDICINE

## 2022-02-17 PROCEDURE — 1159F PR MEDICATION LIST DOCUMENTED IN MEDICAL RECORD: ICD-10-PCS | Mod: CPTII,S$GLB,, | Performed by: FAMILY MEDICINE

## 2022-02-17 PROCEDURE — 3008F BODY MASS INDEX DOCD: CPT | Mod: CPTII,S$GLB,, | Performed by: FAMILY MEDICINE

## 2022-02-17 PROCEDURE — 99396 PREV VISIT EST AGE 40-64: CPT | Mod: S$GLB,,, | Performed by: FAMILY MEDICINE

## 2022-02-17 PROCEDURE — 99396 PR PREVENTIVE VISIT,EST,40-64: ICD-10-PCS | Mod: S$GLB,,, | Performed by: FAMILY MEDICINE

## 2022-02-17 NOTE — PROGRESS NOTES
Subjective:      Patient ID: Grace Beltran is a 56 y.o. female.    Chief Complaint: Annual Exam    Here today for general exam.     For osteoporosis, she was treated by endocrinologist Dr. Blas with IV Reclast. She did restart Vit D replacement for several months & ready for labs.     celexa 40 mg working well for anxiety    Bilateral hip pain, had injection in past w Margarita & Amari. Flaring up     Denies any chest pain, shortness of breath, nausea vomiting constipation diarrhea, blood in stool, heartburn      Current Outpatient Medications:     citalopram (CELEXA) 40 MG tablet, TAKE 1 TABLET BY MOUTH EVERY DAY, Disp: 90 tablet, Rfl: 3  No current facility-administered medications for this visit.    No results found for: HGBA1C  No results found for: MICALBCREAT  Lab Results   Component Value Date    LDLCALC 131.4 02/15/2022    LDLCALC 124.0 12/10/2020    CHOL 227 (H) 02/15/2022    HDL 74 02/15/2022    TRIG 108 02/15/2022       Lab Results   Component Value Date     02/15/2022    K 4.0 02/15/2022     02/15/2022    CO2 31 (H) 02/15/2022    GLU 86 02/15/2022    BUN 13 02/15/2022    CREATININE 0.8 02/15/2022    CALCIUM 9.3 02/15/2022    PROT 7.1 02/15/2022    ALBUMIN 3.9 02/15/2022    BILITOT 0.4 02/15/2022    ALKPHOS 90 02/15/2022    AST 18 02/15/2022    ALT 21 02/15/2022    ANIONGAP 9 02/15/2022    ESTGFRAFRICA >60.0 02/15/2022    EGFRNONAA >60.0 02/15/2022    WBC 3.76 (L) 02/15/2022    HGB 14.2 02/15/2022    HGB 13.7 12/10/2020    HCT 43.2 02/15/2022    MCV 90 02/15/2022     02/15/2022    TSH 0.819 12/10/2020    HEPCAB Negative 05/18/2017       Lab Results   Component Value Date    FSH 6.2 01/12/2004    PROGESTERONE 16.3 (H) 02/25/2004    ESTRADIOL 110 01/12/2004    [id number] 16 (L) 08/19/2019    [id number] 18 (L) 12/17/2014         Past Medical History:   Diagnosis Date    Acne     Dermatologist prescribes spironolactone     Anxiety     celexa 40    Chronic constipation 5/24/2017  "   History of patellar fracture 7/17/2019    L, slipped at home    Hot flashes due to menopause 5/24/2017    Other osteoporosis without current pathological fracture 05/24/2017    Reclast with Rheum, ankle & patella fracture, mom with dx    Primary osteoarthritis of left hip 05/24/2017    Dr Garay, [facility] needling at [facility] helping, MRI 2016, Injection didn't help much    Right elbow pain 07/20/2017    [facility]    Vitamin D deficiency 8/19/2019    Dr Blas started 50,000 q 7d in August 2019     Past Surgical History:   Procedure Laterality Date    COLONOSCOPY N/A 4/8/2019    Procedure: COLONOSCOPY;  Surgeon: Rakesh Parmar MD;  Location: [facility] ENDO (4TH FLR);  Service: Endoscopy;  Laterality: N/A; significantly excessive looping of the colon    COLONOSCOPY N/A 6/13/2019    Procedure: COLONOSCOPY - Device-assisted;  Surgeon: Sravan Haji MD;  Location: [facility] ENDO (2ND FLR);  Service: Endoscopy;  Laterality: N/A;  Single-balloon scope; unremarkable, repeat in 10 years for screening    right hip surgery      April 24, 2012     Social History     Social History Narrative     with EMERITA (works with HIV, Hepatitis C), 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[employer], 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[employer] [place]    dtr Faby [employer], [employer] son Yves 12 yo  had osteotomy in [place] 2017, 9 yo dtr introvert    Nonsmoker, ETOH 4 x / week    GYN Blaine, never had colonoscopy     Family History   Problem Relation Age of Onset    Hypertension Mother     Hyperlipidemia Mother     Osteoporosis Mother     Breast cancer Maternal Aunt 65    Colon cancer Neg Hx     Colon polyps Neg Hx     Crohn's disease Neg Hx     Ulcerative colitis Neg Hx     Stomach cancer Neg Hx     Esophageal cancer Neg Hx      Vitals:    02/17/22 1144   BP: 115/67   Pulse: 71   Resp: 18   Temp: 98 °F (36.7 °C)   SpO2: 97%   Weight: 68.3 kg (150 lb 9.2 oz)   Height: 5' 3" (1.6 m)   PainSc: 0-No pain     Objective: "   Physical Exam  Constitutional:       Appearance: She is well-developed.   HENT:      Head: Normocephalic and atraumatic.      Nose:      Comments: Wearing mask due to current COVID 19 pandemic, Nose & mouth exam deferred  Eyes:      Pupils: Pupils are equal, round, and reactive to light.   Neck:      Thyroid: No thyromegaly.   Cardiovascular:      Rate and Rhythm: Normal rate and regular rhythm.      Heart sounds: Normal heart sounds. No murmur heard.      Pulmonary:      Effort: Pulmonary effort is normal.      Breath sounds: Normal breath sounds. No wheezing.   Abdominal:      General: Bowel sounds are normal. There is no distension.      Palpations: Abdomen is soft. There is no mass.      Tenderness: There is no abdominal tenderness. There is no guarding or rebound.   Musculoskeletal:      Cervical back: Neck supple.   Lymphadenopathy:      Cervical: No cervical adenopathy.   Skin:     General: Skin is warm and dry.   Neurological:      Mental Status: She is alert and oriented to person, place, and time.   Psychiatric:         Behavior: Behavior normal.       Assessment:     1. Annual physical exam    2. Anxiety    3. Other screening mammogram      Plan:     Orders Placed This Encounter    Mammo Digital Screening Bilat     Labs for Dr Blas, follow with Endo    Follow with GYN for female health & cancer prevention    Due for  Vaccines  - at your pharmacy    Continue Celexa, working well    ==============================  RECOMMENDATIONS FOR FEMALES  ==============================  Your #1 MEDICINE is DAILY EXERCISE - 15-20 minutes of huffing & puffing EVERY DAY.     Prevent the #1 cause of death- cardiovascular disease (HEART ATTACK & STROKE) by checking for normal blood pressure, cholesterol, sugars, & by not smoking.     VACCINES: Yearly FLU shot, PNEUMONIA shot after 65,  SHINGLES shot after 50    COLON CANCER screening colonoscopy starting at 46 yo &  every 10 years (or Cologuard kit every 3 yrs) , repeat  test sooner if POLYP is found    I recommend  high fiber (5 fresh fruits or vegetables daily), low fat diet and aerobic  exercise (huffing/ puffing/ sweating for 20 min straight at least 4 days a week)    Follow up yearly with mammogram, fasting lipids, CMP, CBC prior.   ==============================================================      There are no Patient Instructions on file for this visit.

## 2022-03-03 ENCOUNTER — HOSPITAL ENCOUNTER (OUTPATIENT)
Dept: RADIOLOGY | Facility: HOSPITAL | Age: 57
Discharge: HOME | End: 2022-03-03
Attending: FAMILY MEDICINE
Payer: COMMERCIAL

## 2022-03-03 ENCOUNTER — LAB VISIT (OUTPATIENT)
Dept: LAB | Facility: HOSPITAL | Age: 57
End: 2022-03-03
Attending: HOSPITALIST
Payer: COMMERCIAL

## 2022-03-03 DIAGNOSIS — E55.9 VITAMIN D DEFICIENCY: ICD-10-CM

## 2022-03-03 DIAGNOSIS — M81.8 OTHER OSTEOPOROSIS WITHOUT CURRENT PATHOLOGICAL FRACTURE: ICD-10-CM

## 2022-03-03 DIAGNOSIS — Z12.31 OTHER SCREENING MAMMOGRAM: ICD-10-CM

## 2022-03-03 LAB
25(OH)D3+25(OH)D2 SERPL-MCNC: 41 NG/ML (ref 30–96)
MAGNESIUM SERPL-MCNC: 1.9 MG/DL (ref 1.6–2.6)
PHOSPHATE SERPL-MCNC: 3 MG/DL (ref 2.7–4.5)
PTH-INTACT SERPL-MCNC: 68 PG/ML (ref 9–77)
TSH SERPL DL<=0.005 MIU/L-ACNC: 0.94 UIU/ML (ref 0.4–4)

## 2022-03-03 PROCEDURE — 83970 ASSAY OF PARATHORMONE: CPT | Performed by: HOSPITALIST

## 2022-03-03 PROCEDURE — 77063 BREAST TOMOSYNTHESIS BI: CPT | Mod: TC,PN

## 2022-03-03 PROCEDURE — 82306 VITAMIN D 25 HYDROXY: CPT | Performed by: HOSPITALIST

## 2022-03-03 PROCEDURE — 36415 COLL VENOUS BLD VENIPUNCTURE: CPT | Mod: PN | Performed by: HOSPITALIST

## 2022-03-03 PROCEDURE — 84443 ASSAY THYROID STIM HORMONE: CPT | Performed by: HOSPITALIST

## 2022-03-03 PROCEDURE — 83735 ASSAY OF MAGNESIUM: CPT | Performed by: HOSPITALIST

## 2022-03-03 PROCEDURE — 77067 MAMMO DIGITAL SCREENING BILAT WITH TOMO: ICD-10-PCS | Mod: 26,,, | Performed by: RADIOLOGY

## 2022-03-03 PROCEDURE — 77063 BREAST TOMOSYNTHESIS BI: CPT | Mod: 26,,, | Performed by: RADIOLOGY

## 2022-03-03 PROCEDURE — 84100 ASSAY OF PHOSPHORUS: CPT | Performed by: HOSPITALIST

## 2022-03-03 PROCEDURE — 77063 MAMMO DIGITAL SCREENING BILAT WITH TOMO: ICD-10-PCS | Mod: 26,,, | Performed by: RADIOLOGY

## 2022-03-03 PROCEDURE — 77067 SCR MAMMO BI INCL CAD: CPT | Mod: 26,,, | Performed by: RADIOLOGY

## 2022-03-18 ENCOUNTER — OFFICE VISIT (OUTPATIENT)
Dept: SPORTS MEDICINE | Facility: CLINIC | Age: 57
End: 2022-03-18
Payer: COMMERCIAL

## 2022-03-18 ENCOUNTER — HOSPITAL ENCOUNTER (OUTPATIENT)
Dept: RADIOLOGY | Facility: HOSPITAL | Age: 57
Discharge: HOME | End: 2022-03-18
Attending: ORTHOPAEDIC SURGERY
Payer: COMMERCIAL

## 2022-03-18 VITALS
BODY MASS INDEX: 26.58 KG/M2 | HEART RATE: 70 BPM | SYSTOLIC BLOOD PRESSURE: 118 MMHG | DIASTOLIC BLOOD PRESSURE: 79 MMHG | WEIGHT: 150 LBS | HEIGHT: 63 IN

## 2022-03-18 DIAGNOSIS — M99.02 SOMATIC DYSFUNCTION OF THORACIC REGION: ICD-10-CM

## 2022-03-18 DIAGNOSIS — M25.552 LEFT HIP PAIN: ICD-10-CM

## 2022-03-18 DIAGNOSIS — M16.12 PRIMARY OSTEOARTHRITIS OF LEFT HIP: ICD-10-CM

## 2022-03-18 DIAGNOSIS — M99.04 SOMATIC DYSFUNCTION OF SACRAL REGION: ICD-10-CM

## 2022-03-18 DIAGNOSIS — M99.06 SOMATIC DYSFUNCTION OF LOWER EXTREMITY: ICD-10-CM

## 2022-03-18 DIAGNOSIS — M99.03 SOMATIC DYSFUNCTION OF LUMBAR REGION: ICD-10-CM

## 2022-03-18 DIAGNOSIS — M99.05 SOMATIC DYSFUNCTION OF PELVIS REGION: ICD-10-CM

## 2022-03-18 DIAGNOSIS — M25.552 LEFT HIP PAIN: Primary | ICD-10-CM

## 2022-03-18 PROCEDURE — 99214 PR OFFICE/OUTPT VISIT, EST, LEVL IV, 30-39 MIN: ICD-10-PCS | Mod: 25,S$GLB,, | Performed by: ORTHOPAEDIC SURGERY

## 2022-03-18 PROCEDURE — 1159F MED LIST DOCD IN RCRD: CPT | Mod: CPTII,S$GLB,, | Performed by: ORTHOPAEDIC SURGERY

## 2022-03-18 PROCEDURE — 3074F PR MOST RECENT SYSTOLIC BLOOD PRESSURE < 130 MM HG: ICD-10-PCS | Mod: CPTII,S$GLB,, | Performed by: ORTHOPAEDIC SURGERY

## 2022-03-18 PROCEDURE — 1160F RVW MEDS BY RX/DR IN RCRD: CPT | Mod: CPTII,S$GLB,, | Performed by: ORTHOPAEDIC SURGERY

## 2022-03-18 PROCEDURE — 99214 OFFICE O/P EST MOD 30 MIN: CPT | Mod: 25,S$GLB,, | Performed by: ORTHOPAEDIC SURGERY

## 2022-03-18 PROCEDURE — 3078F DIAST BP <80 MM HG: CPT | Mod: CPTII,S$GLB,, | Performed by: ORTHOPAEDIC SURGERY

## 2022-03-18 PROCEDURE — 99999 PR PBB SHADOW E&M-EST. PATIENT-LVL III: CPT | Mod: PBBFAC,,, | Performed by: ORTHOPAEDIC SURGERY

## 2022-03-18 PROCEDURE — 1159F PR MEDICATION LIST DOCUMENTED IN MEDICAL RECORD: ICD-10-PCS | Mod: CPTII,S$GLB,, | Performed by: ORTHOPAEDIC SURGERY

## 2022-03-18 PROCEDURE — 73502 X-RAY EXAM HIP UNI 2-3 VIEWS: CPT | Mod: 26,LT,, | Performed by: RADIOLOGY

## 2022-03-18 PROCEDURE — 73502 X-RAY EXAM HIP UNI 2-3 VIEWS: CPT | Mod: TC,LT

## 2022-03-18 PROCEDURE — 98927 PR OSTEOPATHIC MANIP,5-6 BODY REGN: ICD-10-PCS | Mod: S$GLB,,, | Performed by: ORTHOPAEDIC SURGERY

## 2022-03-18 PROCEDURE — 1160F PR REVIEW ALL MEDS BY PRESCRIBER/CLIN PHARMACIST DOCUMENTED: ICD-10-PCS | Mod: CPTII,S$GLB,, | Performed by: ORTHOPAEDIC SURGERY

## 2022-03-18 PROCEDURE — 97110 PR THERAPEUTIC EXERCISES: ICD-10-PCS | Mod: S$GLB,,, | Performed by: ORTHOPAEDIC SURGERY

## 2022-03-18 PROCEDURE — 97110 THERAPEUTIC EXERCISES: CPT | Mod: S$GLB,,, | Performed by: ORTHOPAEDIC SURGERY

## 2022-03-18 PROCEDURE — 73502 XR HIP WITH PELVIS WHEN PERFORMED, 2 OR 3 VIEWS LEFT: ICD-10-PCS | Mod: 26,LT,, | Performed by: RADIOLOGY

## 2022-03-18 PROCEDURE — 3074F SYST BP LT 130 MM HG: CPT | Mod: CPTII,S$GLB,, | Performed by: ORTHOPAEDIC SURGERY

## 2022-03-18 PROCEDURE — 3078F PR MOST RECENT DIASTOLIC BLOOD PRESSURE < 80 MM HG: ICD-10-PCS | Mod: CPTII,S$GLB,, | Performed by: ORTHOPAEDIC SURGERY

## 2022-03-18 PROCEDURE — 98927 OSTEOPATH MANJ 5-6 REGIONS: CPT | Mod: S$GLB,,, | Performed by: ORTHOPAEDIC SURGERY

## 2022-03-18 PROCEDURE — 3008F PR BODY MASS INDEX (BMI) DOCUMENTED: ICD-10-PCS | Mod: CPTII,S$GLB,, | Performed by: ORTHOPAEDIC SURGERY

## 2022-03-18 PROCEDURE — 99999 PR PBB SHADOW E&M-EST. PATIENT-LVL III: ICD-10-PCS | Mod: PBBFAC,,, | Performed by: ORTHOPAEDIC SURGERY

## 2022-03-18 PROCEDURE — 3008F BODY MASS INDEX DOCD: CPT | Mod: CPTII,S$GLB,, | Performed by: ORTHOPAEDIC SURGERY

## 2022-03-18 NOTE — PROGRESS NOTES
CC: left hip pain    56 y.o. Female presents today for evaluation of her left hip pain. She admits to left hip pain for the past several years without known mechanism of injury. When asked where she hurts she gestures to the left groin and states her pain feels deep within her hip.   How long: Admits to left hip pain for the past several years.   What makes it better: Admits to improved pain while at rest.   What makes it worse: Admits to increased pain with activity.   Does it radiate: Admits to her pain radiating to the anterior thigh.   Attempted treatments: Admits to trying tylenol without improvement. Denies history of physical therapy, denies history of left hip injection or surgery.   Pain score: 5/10  Any mechanical symptoms: Admits to mechanical symptoms.   Feelings of instability: Admits to feelings of instability.  Affecting ADLs: Admits to this problem affecting her ability to perform ADLs, as she will frequently have to limp with ambulation.     REVIEW OF SYSTEMS:   Constitution: Patient denies fever, chills, night sweats, and weight changes.  Eyes: Patient denies eye pain or vision changes.  HENT: Patient denies headache, ear pain, sore throat, or nasal discharge.  CVS: Patient denies chest pain.  Lungs: Patient denies shortness of breath or cough.  Abd: Patient denies stomach pain, nausea, or vomiting.  Skin: Patient denies skin rash or itching.    Hematologic/Lymphatic: Patient denies easy bruising.   Musculoskeletal: Patient denies recent falls. See HPI.  Psych: Patient denies any current anxiety or nervousness.    PAST MEDICAL HISTORY:   Past Medical History:   Diagnosis Date    Acne     Dermatologist prescribes spironolactone     Anxiety     celexa 40    Chronic constipation 5/24/2017    History of patellar fracture 7/17/2019    L, slipped at home    Hot flashes due to menopause 5/24/2017    Other osteoporosis without current pathological fracture 05/24/2017    Reclast with Rheum, ankle &  patella fracture, mom with dx    Primary osteoarthritis of left hip 05/24/2017    Dr Garay, dry needling at [facility] helping, MRI 2016, Injection didn't help much    Right elbow pain 07/20/2017    dry needling at [facility] helping    Vitamin D deficiency 8/19/2019    Dr Blas started 50,000 q 7d in August 2019       PAST SURGICAL HISTORY:   Past Surgical History:   Procedure Laterality Date    COLONOSCOPY N/A 4/8/2019    Procedure: COLONOSCOPY;  Surgeon: Rakesh Parmar MD;  Location: [facility] ENDO (4TH FLR);  Service: Endoscopy;  Laterality: N/A; significantly excessive looping of the colon    COLONOSCOPY N/A 6/13/2019    Procedure: COLONOSCOPY - Device-assisted;  Surgeon: Sravan Haji MD;  Location: [facility] ENDO (2ND FLR);  Service: Endoscopy;  Laterality: N/A;  Single-balloon scope; unremarkable, repeat in 10 years for screening    right hip surgery      April 24, 2012       FAMILY HISTORY:   Family History   Problem Relation Age of Onset    Hypertension Mother     Hyperlipidemia Mother     Osteoporosis Mother     Breast cancer Maternal Aunt 65    Colon cancer Neg Hx     Colon polyps Neg Hx     Crohn's disease Neg Hx     Ulcerative colitis Neg Hx     Stomach cancer Neg Hx     Esophageal cancer Neg Hx        SOCIAL HISTORY:   Social History     Socioeconomic History    Marital status: Single   Tobacco Use    Smoking status: Never Smoker    Smokeless tobacco: Never Used   Substance and Sexual Activity    Alcohol use: Yes     Alcohol/week: 8.0 standard drinks     Types: 4 Standard drinks or equivalent, 4 Cans of beer per week    Drug use: No    Sexual activity: Yes     Partners: Female   Social History Narrative     with EMERITA (works with HIV, Hepatitis C), 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[employer], partner [employer]    dtr [employer], [employer] son Yves 12 yo  had osteotomy in [place] 2017, 9 yo dtr introvert    Nonsmoker, ETOH 4 x / week    GYN Blaine, never had  "colonoscopy       MEDICATIONS:     Current Outpatient Medications:     citalopram (CELEXA) 40 MG tablet, TAKE 1 TABLET BY MOUTH EVERY DAY, Disp: 90 tablet, Rfl: 3    ALLERGIES:   Review of patient's allergies indicates:   Allergen Reactions    Codeine      Other reaction(s): Nausea    Hydrocodone      Other reaction(s): Vomiting  Other reaction(s): Nausea    Promethazine      Other reaction(s): Vomiting  Other reaction(s): Nausea        PHYSICAL EXAMINATION:  /79   Pulse 70   Ht 5' 3" (1.6 m)   Wt 68 kg (150 lb)   LMP 11/29/2016 (Approximate)   BMI 26.57 kg/m²   Vitals signs and nursing note have been reviewed.  General: In no acute distress, well developed, well nourished, no diaphoresis  Eyes: EOM full and smooth, no eye redness or discharge  HENT: normocephalic and atraumatic, neck supple, trachea midline, no nasal discharge, no external ear redness or discharge  Cardiovascular: no LE edema  Lungs: respirations non-labored, no conversational dyspnea   Abd: non-distended, no rigidity  MSK: no amputation or deformity, no swelling of extremities  Neuro: AAOx3, CN2-12 grossly intact  Skin: No rashes, warm and dry  Psychiatric: cooperative, pleasant, mood and affect appropriate for age    HIP: LEFT  The affected hip is compared to the contralateral hip.    Observation:    There is no edema, erythema, or ecchymosis in the lumbosacral region.   No obvious pelvic obliquity while standing.    No thoracolumbar scoliosis observed.    No midline skin abnormalities.  No atrophy noted in the lower limbs.    ROM:   Passive hip flexion to 100° on left and 120° on right.    Passive hip internal rotation to 35° on left and 45° on right.    Passive hip external rotation to 35° on left and 45° on right.    Passive hip abduction to 25° on left and 45° on right.    Pain present with all motions on the left    Tenderness To Palpation:  No tenderness at the ASIS, AIIS, PSIS, PIIS, iliac crest, pubic bones, ischial " tuberosity.  No tenderness throughout the lumbar spine, iliolumbar region, or posterior pelvis.  No tenderness throughout the sacrum or piriformis  + tenderness over the greater trochanter  + tenderness at the glut attachments on the greater trochanter  No tenderness over proximal IT band or hip flexor musculature.    Strength Testing:  Hip flexion - 5/5 on left and 5/5 on right  Hip extension - 5/5 on left and 5/5 on right  Hip adduction - 5/5 on left and 5/5 on right  Hip abduction - 5/5 on left and 5/5 on right  Knee flexion - 5/5 on left and 5/5 on right  Knee extension - 5/5 on left and 5/5 on right    Special Tests:  Seated straight leg raise - negative  Supine straight leg raise - negative  Hamstring flexibility symmetric  Quadriceps flexibility symmetric.    Log roll - positive  DEANDRE - positive  FADIR - positive  Scour test - positive  + pain with posterior hip capsule compression    ASIS compression test - negative  SI drawer test - negative   Thigh thrust test - negative     Buddy test - negative  Timothy compression test - negative    Posture:  Upright and Anterior pelvic tilt with increased lumbar lordosis  Gait: Non-antalgic     TART (Tissue texture abnormality, Asymmetry,  Restriction of motion and/or Tenderness) changes:    Head     Cervical Spine  Thoracic Spine  Lumbar Spine   C1  T1 Neutral L1 TTAR   C2  T2 Neutral L2 TTAR   C3  T3 Neutral L3 Neutral   C4  T4 Neutral L4 ERSL   C5  T5 Neutral L5 ERSL   C6  T6 Neutral     C7  T7 TTAR       T8 TTAR       T9 TTAR       T10 TTAR       T11 TTAR       T12 TTAR       Ribs:    Upper Extremity:    Abdomen:    Pelvis:  · Innominate:Left anterior rotation  · Pubic bone:Left interior pubic shear    Sacrum:Right on Left sacral torsion    Lower Extremity:  Myofascial restriction left lower extremity      Key   F= Flexed   E = Extended   R = Rotated   N = Neutral   S = Sidebent   TTA = tissue texture abnormality   L/R/B = left/right/bilateral (last letter)        Neurovascular Exam:  Normal gait without Trendelenburg.  2+ femoral, DP, and PT pulses BL.  No skin changes, no abnormal hair distribution.  Sensation intact to light touch throughout the obturator and medial/lateral/posterior femoral cutaneous nerves.  Capillary refill intact to <2 seconds in all lower limb digits.      IMAGIN. X-ray ordered due to left hip pain   2. X-ray images were reviewed personally by me and then directly with patient.  3. FINDINGS: X-ray images obtained demonstrate bilateral degenerative arthritic changes with joint space narrowing, spurring and subchondral cysts, no acute fracture or dislocation.  4. IMPRESSION: As above.       ASSESSMENT:      ICD-10-CM ICD-9-CM   1. Left hip pain  M25.552 719.45   2. Primary osteoarthritis of left hip  M16.12 715.15   3. Somatic dysfunction of lumbar region  M99.03 739.3   4. Somatic dysfunction of pelvis region  M99.05 739.5   5. Somatic dysfunction of lower extremity  M99.06 739.6   6. Somatic dysfunction of sacral region  M99.04 739.4   7. Somatic dysfunction of thoracic region  M99.02 739.2         PLAN:  1-2.  Left hip pain/osteoarthritis - new issue to provider     - Grace admits to deep left anterior hip pain for the past several years that has grown progressively worse without known mechanism of injury. She has been appreciating mechanical symptoms and feelings of instability.     - XRs ordered in the office today and images were personally reviewed with the patient. See above for further detail.    - Based on her description of pain/body language and somatic dysfunction identified on exam, I discussed osteopathic manipulation as a treatment option today. She consents to evaluation and treatment. See below.    - HEP for abdominal bracing, ant marches, diaphragmatic breathing, pelvic clocks, calf stretching, quad stretching, hamstring stretching, copy of mini squats, clamshells,  prescribed today. Handouts provided, explained, and  exercises were demonstrated as needed. Encouraged to do daily. 71460 HOME EXERCISE PROGRAM (HEP):  The patient was taught a homegoing physical therapy regimen as described above by provider with assistance of sports medicine assistant. The patient demonstrated understanding of the exercises and proper technique of their execution. This interaction took 15 minutes.     - We discussed conservative and surgical options.  She would like to discuss surgical options at this time but is not opposed to conservative options based on her ability to schedule replacement surgery.      3-7. Somatic dysfunction lumbar, pelvis, sacral, thoracic, lower extremity regions -      OMT 5-6 regions. Oral consent obtained. Reviewed benefits and potential side effects. OMT indicated today due to signs and symptoms as well as local and remote somatic dysfunction findings and their related neurokinetic, lymphatic, fascial and/or arteriovenous body connections. OMT techniques used: Soft Tissue, Myofascial Release and Muscle Energy. Treatment was tolerated well. Improvement noted in segmental mobility post-treatment in dysfunctional regions. There were no adverse events and no complications immediately following treatment. Advised plenty of water to help alleviate soreness.      Future planning includes - next steps per Dr. Quiroz recommendation.  I am happy to do an intra-articular injection if she would like to hold off on joint replacement surgery    All questions were answered to the best of my ability and all concerns were addressed at this time.    Follow up per Dr. Quiroz       This note is dictated using the M*Modal Fluency Direct word recognition program. There are word recognition mistakes that are occasionally missed on review.

## 2022-03-22 RX ORDER — CITALOPRAM 40 MG/1
TABLET, FILM COATED ORAL
Qty: 90 TABLET | Refills: 0 | Status: SHIPPED | OUTPATIENT
Start: 2022-03-22 | End: 2022-07-07

## 2022-03-22 NOTE — TELEPHONE ENCOUNTER
No new care gaps identified.  Powered by [employer]. Reference number: [id number].   3/21/2022 7:14:30 PM CDT

## 2022-03-30 NOTE — TELEPHONE ENCOUNTER
No new care gaps identified.  Powered by [employer] Reference number: [id number].   3/30/2022 8:09:26 AM CDT

## 2022-03-31 RX ORDER — CITALOPRAM 40 MG/1
TABLET, FILM COATED ORAL
Qty: 90 TABLET | Refills: 0 | OUTPATIENT
Start: 2022-03-31

## 2022-03-31 NOTE — TELEPHONE ENCOUNTER
Jessica DC. Request already responded to by other means (e.g. phone or fax)   Refill Authorization Note   Grace Devin  is requesting a refill authorization.  Brief Assessment and Rationale for Refill:  Quick Discontinue  Medication Therapy Plan:       Medication Reconciliation Completed:  No      Comments:   Pended Medication(s)       Requested Prescriptions     Refused Prescriptions Disp Refills    citalopram (CELEXA) 40 MG tablet [Pharmacy Med Name: CITALOPRAM 40MG TABLETS] 90 tablet 0     Sig: TAKE 1 TABLET BY MOUTH EVERY DAY     Refused By: KD MÉNDEZ     Reason for Refusal: Duplicate          Note composed:2:13 PM 03/31/2022

## 2022-04-28 NOTE — PROGRESS NOTES
Subjective:          Chief Complaint: Grace Beltran is a 56 y.o. female who had concerns including Pain of the Left Hip and Pain of the Right Hip.    Grace Beltran reports to our clinic today for follow up examination of the left hip. She most recently saw Dr. Alessio Avila for this issue and he discussed the need for joint replacement surgery. He also has treated the patient with HEP, OMT and offered and intra-articular injection. Has previously had a CSI which helped for 1-2 months. She would like to discuss surgical options with Dr. Quiroz. Hx of prior right hip scope with labral repair by Dr. Lackey ~8 years ago. Now has bilateral hip pain/stiffness x several years. Pain is located in the groin. Worse with prolonged sitting or increased activity.       Review of Systems   Constitutional: Negative for chills and fever.   HENT: Negative for congestion and sore throat.    Eyes: Negative for discharge and double vision.   Cardiovascular: Negative for chest pain, palpitations and syncope.   Respiratory: Negative for cough and shortness of breath.    Endocrine: Negative for cold intolerance and heat intolerance.   Skin: Negative for dry skin and rash.   Musculoskeletal: Positive for joint pain.   Gastrointestinal: Negative for abdominal pain, nausea and vomiting.   Neurological: Negative for focal weakness, numbness and paresthesias.                   Objective:        General: Grace is well-developed, well-nourished, appears stated age, in no acute distress, alert and oriented to time, place and person.     General    Vitals reviewed.  Constitutional: She is oriented to person, place, and time. She appears well-developed and well-nourished. No distress.   HENT:   Mouth/Throat: No oropharyngeal exudate.   Eyes: Right eye exhibits no discharge. Left eye exhibits no discharge.   Cardiovascular: Normal rate.    Pulmonary/Chest: Effort normal and breath sounds normal. No respiratory distress.   Neurological: She is  alert and oriented to person, place, and time. She has normal reflexes. No cranial nerve deficit. Coordination normal.   Psychiatric: She has a normal mood and affect. Her behavior is normal. Judgment and thought content normal.     General Musculoskeletal Exam   Gait: normal   Pelvic Obliquity: none      Right Knee Exam     Inspection   Alignment:  normal  Effusion: absent    Left Knee Exam     Inspection   Alignment:  normal  Effusion: absent    Right Hip Exam     Inspection   Scars: absent  Swelling: absent  Bruising: absent  No deformity of hip.  Quadriceps Atrophy:  Negative  Erythema: absent    Range of Motion   Abduction:  40 normal   Adduction:  20 normal   Extension:  0 normal   Flexion:  90 normal   External rotation:  30 normal   Internal rotation:  0 normal     Tests   Pain w/ forced internal rotation (DEANDRE): present  Pain w/ forced external rotation (FADIR): present  Buddy: negative  Trendelenburg Test: negative  Circumduction test: negative  Stinchfield test: positive  Log Roll: negative  Snapping Hip (internal): negative  Step-down test: negative  Resisted sit-up pain: negative  Unresisted sit-up pain: negative  Resisted sit-up pain with adductor contraction pain: negative    Other   Sensation: normal  Left Hip Exam     Inspection   Scars: absent  Swelling: absent  No deformity of hip.  Quadriceps Atrophy:  negative  Erythema: absent  Bruising: absent    Range of Motion   Abduction:  40 normal   Adduction:  20 normal   Extension:  0 normal   Flexion:  100 normal   External rotation:  60 normal   Internal rotation: 0 normal     Tests   Pain w/ forced internal rotation (DEANDRE): present  Pain w/ forced external rotation (FADIR): present  Buddy: negative  Trendelenburg Test: negative  Circumduction test: negative  Stinchfield test: positive  Log Roll: negative  Snapping Hip (internal): negative  Step-down test: negative  Resisted sit-up pain: negative  Resisted sit-up pain with adductor contraction pain:  negative  Unresisted sit-up pain: negative    Other   Sensation: normal      Back (L-Spine & T-Spine) / Neck (C-Spine) Exam   Back exam is normal.      Muscle Strength   Right Lower Extremity   Hip Abduction: 5/5   Hip Adduction: 5/5   Hip Flexion: 5/5   Left Lower Extremity   Hip Abduction: 5/5   Hip Adduction: 5/5   Hip Flexion: 5/5     Reflexes     Left Side  Achilles:  2+  Quadriceps:  2+    Right Side   Achilles:  2+  Quadriceps:  2+    Vascular Exam     Right Pulses  Dorsalis Pedis:      2+  Posterior Tibial:      2+        Left Pulses  Dorsalis Pedis:      2+  Posterior Tibial:      2+        Capillary Refill  Right Hand: normal capillary refill  Left Hand: normal capillary refill        Edema  Right Upper Leg: absent  Left Upper Leg: absent    Radiographs ordered and reviewed today in clinic of the bilateral hip demonstrates bilateral kellgren jean paul grade 3 osteoarthritis. No significant protrusio or dysplasia.        Assessment:       Encounter Diagnoses   Name Primary?    Left hip pain Yes    Primary osteoarthritis of left hip     Arthritis of right hip     Bilateral hip pain           Plan:       1. IKDC, SF-12 and KOOS was filled out today in clinic.     RTC in 3 months with Dr. Lisa Quiroz Patient will fill out IKDC, SF-12 and KOOS on return.    2. Medications: Refills of the following Rx were sent to patients preferred Pharmacy:  [facility], Robaxin    3. Physical Therapy: Continue/Begin: Begin at [facility] 2nd [facility]    4. HEP: HEP 54599 - Dr. Quiroz, instructed and demonstrated a core/hip HEP. The patient then demonstrated understanding of exercises and proper technique. This program was performed for 15 minutes.     5. Procedures/Procedural Planning:   We reviewed with Grace today, the pathology and natural history of her diagnosis. We have discussed a variety of treatment options including medications, physical therapy and other alternative treatments. I also explained the indications,  risks and benefits of surgery. After discussion, Grace decided to proceed with surgery. The decision was made to go forward with:     Right  1. Anterior Total Hip Replacement on [place] table (00136)    The details of the surgical procedure were explained, including the location of probable incisions and a description of likely hardware and/or grafts to be used.  The patient understands the likely convalescence after surgery.  Also, we have thoroughly discussed the risks, benefits and alternatives to surgery, including, but not limited to, the risk of infection, joint stiffness, blood clot (including DVT and/or pulmonary embolus), neurologic and vascular injury.  It was explained that, if tissue has been repaired or reconstructed, there is a chance of failure, which may require further management.      All of the patient's questions were answered and informed consent was obtained. The patient will contact us if they have any questions or concerns in the interim.      6. DME: N/A    7. Work/Sport Status: Full    8. Visit Summary: Bilateral hip osteoarthritis. Patient wishes to proceed with intraarticular hip injection with Dr. Avila and will RTC in 3 months for pre op planning for R FELICIA.                            Korin patient questionnaires have been collected today.

## 2022-04-30 ENCOUNTER — PATIENT OUTREACH (OUTPATIENT)
Dept: ADMINISTRATIVE | Facility: OTHER | Age: 57
End: 2022-04-30
Payer: COMMERCIAL

## 2022-04-30 NOTE — PROGRESS NOTES
Care Everywhere:   Immunization: updated  Health Maintenance: updated  Media Review:   Legacy Review:   DIS:  Order placed:   Upcoming appts:  EFAX:  Task Tickets:  Referrals:

## 2022-05-02 ENCOUNTER — OFFICE VISIT (OUTPATIENT)
Dept: SPORTS MEDICINE | Facility: CLINIC | Age: 57
End: 2022-05-02
Payer: COMMERCIAL

## 2022-05-02 VITALS
HEIGHT: 63 IN | RESPIRATION RATE: 18 BRPM | BODY MASS INDEX: 26.58 KG/M2 | HEART RATE: 73 BPM | DIASTOLIC BLOOD PRESSURE: 75 MMHG | SYSTOLIC BLOOD PRESSURE: 122 MMHG | WEIGHT: 150 LBS

## 2022-05-02 DIAGNOSIS — M25.552 LEFT HIP PAIN: Primary | ICD-10-CM

## 2022-05-02 DIAGNOSIS — M25.552 BILATERAL HIP PAIN: ICD-10-CM

## 2022-05-02 DIAGNOSIS — M16.11 ARTHRITIS OF RIGHT HIP: ICD-10-CM

## 2022-05-02 DIAGNOSIS — M25.551 BILATERAL HIP PAIN: ICD-10-CM

## 2022-05-02 DIAGNOSIS — M16.12 PRIMARY OSTEOARTHRITIS OF LEFT HIP: ICD-10-CM

## 2022-05-02 PROCEDURE — 97110 THERAPEUTIC EXERCISES: CPT | Mod: GP,S$GLB,, | Performed by: ORTHOPAEDIC SURGERY

## 2022-05-02 PROCEDURE — 99213 OFFICE O/P EST LOW 20 MIN: CPT | Mod: S$GLB,,, | Performed by: ORTHOPAEDIC SURGERY

## 2022-05-02 PROCEDURE — 3008F PR BODY MASS INDEX (BMI) DOCUMENTED: ICD-10-PCS | Mod: CPTII,S$GLB,, | Performed by: ORTHOPAEDIC SURGERY

## 2022-05-02 PROCEDURE — 3074F PR MOST RECENT SYSTOLIC BLOOD PRESSURE < 130 MM HG: ICD-10-PCS | Mod: CPTII,S$GLB,, | Performed by: ORTHOPAEDIC SURGERY

## 2022-05-02 PROCEDURE — 3074F SYST BP LT 130 MM HG: CPT | Mod: CPTII,S$GLB,, | Performed by: ORTHOPAEDIC SURGERY

## 2022-05-02 PROCEDURE — 99213 PR OFFICE/OUTPT VISIT, EST, LEVL III, 20-29 MIN: ICD-10-PCS | Mod: S$GLB,,, | Performed by: ORTHOPAEDIC SURGERY

## 2022-05-02 PROCEDURE — 3078F PR MOST RECENT DIASTOLIC BLOOD PRESSURE < 80 MM HG: ICD-10-PCS | Mod: CPTII,S$GLB,, | Performed by: ORTHOPAEDIC SURGERY

## 2022-05-02 PROCEDURE — 99999 PR PBB SHADOW E&M-EST. PATIENT-LVL IV: ICD-10-PCS | Mod: PBBFAC,,, | Performed by: ORTHOPAEDIC SURGERY

## 2022-05-02 PROCEDURE — 1159F PR MEDICATION LIST DOCUMENTED IN MEDICAL RECORD: ICD-10-PCS | Mod: CPTII,S$GLB,, | Performed by: ORTHOPAEDIC SURGERY

## 2022-05-02 PROCEDURE — 1160F PR REVIEW ALL MEDS BY PRESCRIBER/CLIN PHARMACIST DOCUMENTED: ICD-10-PCS | Mod: CPTII,S$GLB,, | Performed by: ORTHOPAEDIC SURGERY

## 2022-05-02 PROCEDURE — 3008F BODY MASS INDEX DOCD: CPT | Mod: CPTII,S$GLB,, | Performed by: ORTHOPAEDIC SURGERY

## 2022-05-02 PROCEDURE — 99999 PR PBB SHADOW E&M-EST. PATIENT-LVL IV: CPT | Mod: PBBFAC,,, | Performed by: ORTHOPAEDIC SURGERY

## 2022-05-02 PROCEDURE — 97110 PR THERAPEUTIC EXERCISES: ICD-10-PCS | Mod: GP,S$GLB,, | Performed by: ORTHOPAEDIC SURGERY

## 2022-05-02 PROCEDURE — 3078F DIAST BP <80 MM HG: CPT | Mod: CPTII,S$GLB,, | Performed by: ORTHOPAEDIC SURGERY

## 2022-05-02 PROCEDURE — 1160F RVW MEDS BY RX/DR IN RCRD: CPT | Mod: CPTII,S$GLB,, | Performed by: ORTHOPAEDIC SURGERY

## 2022-05-02 PROCEDURE — 1159F MED LIST DOCD IN RCRD: CPT | Mod: CPTII,S$GLB,, | Performed by: ORTHOPAEDIC SURGERY

## 2022-05-02 RX ORDER — CELECOXIB 200 MG/1
200 CAPSULE ORAL 2 TIMES DAILY
Qty: 60 CAPSULE | Refills: 2 | Status: SHIPPED | OUTPATIENT
Start: 2022-05-02 | End: 2022-06-01

## 2022-05-02 RX ORDER — METHOCARBAMOL 500 MG/1
500 TABLET, FILM COATED ORAL 3 TIMES DAILY
Qty: 30 TABLET | Refills: 3 | Status: SHIPPED | OUTPATIENT
Start: 2022-05-02 | End: 2022-06-01

## 2022-05-03 ENCOUNTER — PATIENT MESSAGE (OUTPATIENT)
Dept: RESEARCH | Facility: HOSPITAL | Age: 57
End: 2022-05-03
Payer: COMMERCIAL

## 2022-05-31 ENCOUNTER — CLINICAL SUPPORT (OUTPATIENT)
Dept: REHABILITATION | Facility: HOSPITAL | Age: 57
End: 2022-05-31
Attending: ORTHOPAEDIC SURGERY
Payer: COMMERCIAL

## 2022-05-31 DIAGNOSIS — M25.551 BILATERAL HIP PAIN: ICD-10-CM

## 2022-05-31 DIAGNOSIS — R68.89 DECREASED FUNCTIONAL ACTIVITY TOLERANCE: ICD-10-CM

## 2022-05-31 DIAGNOSIS — M25.552 BILATERAL HIP PAIN: ICD-10-CM

## 2022-05-31 DIAGNOSIS — M62.81 MUSCLE WEAKNESS: Primary | ICD-10-CM

## 2022-05-31 PROCEDURE — 97110 THERAPEUTIC EXERCISES: CPT

## 2022-05-31 PROCEDURE — 97162 PT EVAL MOD COMPLEX 30 MIN: CPT

## 2022-05-31 NOTE — PLAN OF CARE
[facility]   Physical Therapy Initial Evaluation     Date: 5/31/2022   Name: Grace Beltran  Clinic Number: [id number]    Therapy Diagnosis:   Encounter Diagnoses   Name Primary?    Bilateral hip pain     Muscle weakness Yes    Decreased functional activity tolerance      Physician: Lisa Quiroz MD    Physician Orders: PT Eval and Treat : Pre op Rehab for:  Right  Anterior Total Hip Replacement on [place] table    Medical Diagnosis from Referral: M25.551,M25.552 (ICD-10-CM) - Bilateral hip pain   Evaluation Date: 5/31/2022  Authorization Period Expiration: 5/2/2023  Plan of Care Expiration: 6/28/2022  Progress Note Due: 6/21/2022  Visit # / Visits authorized: 1/ 1   FOTO: 1/3    Precautions: osteopetrosis, Anxiety      Time In:11:05am  Time Out: 11:45am  Total Appointment Time (timed & untimed codes): 40 minutes      SUBJECTIVE     Date of onset: 7 years     History of current condition - [facility] reports: Both hips hurt. She had had a labral repair on the R hip before as well. She has osteoperosis. She has very little ROM and gets pain with exercise. It is frustrating because she can't walk for long distances.     Falls: None     Imaging,    Impression:     Bilateral degenerative arthritic changes involving both hips, with some interval progression since 01/15/2020.      Prior Therapy: Lots, for the hip before   Social History: Has a partner and 3 teens   Occupation: , mostly sitting    Prior Level of Function: Independent   Current Level of Function: Hard to get up from a sitting position on the ground     Pain:  Current 1/10, worst 8/10, best 0/10   Location: Both hips   Description: dull, occasioanal sharp, catching    Aggravating Factors: walking long distances, standing too long, swimming   Easing Factors: Sleeping on her back with a pillow under the legs     Patients goals: Getting stronger, core strength, anything to help the hips      Medical History:   Past  Medical History:   Diagnosis Date    Acne     Dermatologist prescribes spironolactone     Anxiety     celexa 40    Chronic constipation 5/24/2017    History of patellar fracture 7/17/2019    L, slipped at home    Hot flashes due to menopause 5/24/2017    Other osteoporosis without current pathological fracture 05/24/2017    Reclast with Rheum, ankle & patella fracture, mom with dx    Primary osteoarthritis of left hip 05/24/2017    Dr Garay, dry needling at [facility] helping, MRI 2016, Injection didn't help much    Right elbow pain 07/20/2017    dry needling at [facility] helping    Vitamin D deficiency 8/19/2019    Dr Blas started 50,000 q 7d in August 2019       Surgical History:   Grace Beltran  has a past surgical history that includes right hip surgery; Colonoscopy (N/A, 4/8/2019); and Colonoscopy (N/A, 6/13/2019).    Medications:   Grace has a current medication list which includes the following prescription(s): celecoxib, citalopram, and methocarbamol.    Allergies:   Review of patient's allergies indicates:   Allergen Reactions    Codeine      Other reaction(s): Nausea    Hydrocodone      Other reaction(s): Vomiting  Other reaction(s): Nausea    Promethazine      Other reaction(s): Vomiting  Other reaction(s): Nausea          OBJECTIVE     Observation: Pt arrives with no AD     Posture: lumbar loridosis, FHP     Gait: decreased hip lateral motion with gait     Palpation:     Hypomobile on R hip     Active/Passive Hip ROM: (measured in degrees)   RLE LLE   Flexion 93 / 100/       Lower Extremity Strength: (graded 1-5 out of 5) 4+/5   RLE LLE   Hip flexion: 4+/5 4+/5   Hip ER 4+/5 4+/5   Hip IR 4+/5 4+/5   Knee extension: 4+/5 4+/5   Ankle dorsiflexion: 4+/5 4+/5   Posterior fibers of Gluteus medius 4+/5 4+/5   Hip extension: 4+/5 4+/5   Knee flexion: 4+/5 4+/5   Glute max 4+/5 4+/5     Special Tests: ((+): pos.; (-): neg.)    RLE LLE   Scour Test + +       Squat test: poor form,     SL  stance: poor hip control for both  L: 10 seconds   R: 30 seconds          Limitation/Restriction for FOTO Hip Survey    Therapist reviewed FOTO scores for Grace Beltran on 5/31/2022.   FOTO documents entered into [employer] - see Media section.    Limitation Score: 46%         TREATMENT     Total Treatment time (time-based codes) separate from Evaluation: 25 minutes      Grace received the treatments listed below:      therapeutic exercises to develop strength, endurance, ROM, flexibility, posture and core stabilization for 25 minutes including:  PPT   Sidelying hip ABD   Bridges   Prone hip extension       PATIENT EDUCATION AND HOME EXERCISES     Education provided:   - HEP, Plan of care     Written Home Exercises Provided: yes. Exercises were reviewed and Grace was able to demonstrate them prior to the end of the session.  Grace demonstrated good  understanding of the education provided. See EMR under Patient Instructions for exercises provided during therapy sessions.    ASSESSMENT     Grace is a 56 y.o. female referred to outpatient Physical Therapy with a medical diagnosis of M25.551,M25.552 (ICD-10-CM) - Bilateral hip pain   . Patient presents with decreased hip ROM, LE weakness, and decreased functional tolerance. Pt was educated on the benefits of prehab, goals, and expectations for rehab after surgery. Pt was given a HEP focusing on glute, hip, and core strength. Pt would benefit from therapy to increased strength and stability, decrease pain, and prepare for surgery    Patient prognosis is Good.   Patient will benefit from skilled outpatient Physical Therapy to address the deficits stated above and in the chart below, provide patient /family education, and to maximize patientt's level of independence.     Plan of care discussed with patient: Yes  Patient's spiritual, cultural and educational needs considered and patient is agreeable to the plan of care and goals as stated below:     Anticipated  Barriers for therapy: none    Medical Necessity is demonstrated by the following  History  Co-morbidities and personal factors that may impact the plan of care Co-morbidities:   anxiety, high BMI and osteoperosis \    Personal Factors:   no deficits     high   Examination  Body Structures and Functions, activity limitations and participation restrictions that may impact the plan of care Body Regions:   back  lower extremities  trunk    Body Systems:    gross symmetry  ROM  strength  gross coordinated movement  gait  motor control    Participation Restrictions:   none    Activity limitations:   Learning and applying knowledge  no deficits    General Tasks and Commands  no deficits    Communication  no deficits    Mobility  lifting and carrying objects  walking    Self care  no deficits    Domestic Life  no deficits    Interactions/Relationships  no deficits    Life Areas  no deficits    Community and Social Life  no deficits         moderate   Clinical Presentation evolving clinical presentation with changing clinical characteristics moderate   Decision Making/ Complexity Score: moderate     Goals:  Short Term Goals: 2 weeks   1. In 2 weeks, pt will be independent with HEP   2. In 2 weeks, pt will report being able to use exercises at home to decrease pain     Long Term Goals: 4 weeks   1. In 4 weeks, pt will be independent with updated HEP   2. In 4 weeks, pt will have R hip ROM the same as L ROM to prepare for surgery     PLAN   Plan of care Certification: 5/31/2022 to 6/28/2022    Outpatient Physical Therapy 2 times weekly for 4 weeks to include the following interventions: Gait Training, Manual Therapy, Neuromuscular Re-ed, Patient Education, Self Care, Therapeutic Activities and Therapeutic Exercise.     Diane Orosco, PT      I CERTIFY THE NEED FOR THESE SERVICES FURNISHED UNDER THIS PLAN OF TREATMENT AND WHILE UNDER MY CARE   Physician's comments:     Physician's Signature:  ___________________________________________________

## 2022-05-31 NOTE — PROGRESS NOTES
See evaluation in POC for goals and assessment     Eval Date: 05/31/2022    Diane Orosco, PT, DPT, CLT

## 2022-06-01 NOTE — PROGRESS NOTES
[facility]   Physical Therapy Treatment Note     Name: Grace Tuttleell  Clinic Number: [id number]    Therapy Diagnosis:   Encounter Diagnoses   Name Primary?    Muscle weakness Yes    Decreased functional activity tolerance      Physician: Lisa Quiroz MD    Visit Date: 6/2/2022    Physician Orders: PT Eval and Treat : Pre op Rehab for:  Right  Anterior Total Hip Replacement on [place] table     Medical Diagnosis from Referral: M25.551,M25.552 (ICD-10-CM) - Bilateral hip pain   Evaluation Date: 5/31/2022  Authorization Period Expiration: 5/2/2023  Plan of Care Expiration: 6/28/2022  Progress Note Due: 6/21/2022  Visit # / Visits authorized: 1/ 20  FOTO: 1/3    PTA Visit #: 1/5     Time In: 8:07 am   Time Out: 9:00 am  Total Billable Time: 30 minutes    Precautions: osteopetrosis, Anxiety      SUBJECTIVE     Pt reports: she is doing okay today with only a little bit of pain currently. Her right hip feels stiffer and her left hip usually hurts more .   She was compliant with home exercise program.  Response to previous treatment: good , no adverse effects   Functional change: none    Pain: 2/10  Location: B hips     OBJECTIVE     Objective Measures updated at progress report unless specified.     Treatment     Grace received the treatments listed below:      therapeutic exercises to develop strength, endurance, ROM, flexibility, posture and core stabilization for 53 minutes including:    Nu-Step : 6' L 2  Piriformis stretch : 3 x 30''   PPT : 2 x 10 reps , 5'' hold  PPT c/ supine clamshell , RTB: 3 x 10 reps   PPT c/ adduction ball squeeze : 3 x 10 reps , 5'' hold   Posterior pelvic tilts c/ bridge: 2 x 10 reps    Sidelying hip ABD : 2 x 10 reps B   Prone hip extension : 2 x 10 reps      Patient Education and Home Exercises     Home Exercises Provided and Patient Education Provided     Education provided:   - HEP    Written Home Exercises Provided: yes. Exercises were reviewed and  Grace was able to demonstrate them prior to the end of the session.  Grace demonstrated good  understanding of the education provided. See EMR under Patient Instructions for exercises provided during therapy sessions    ASSESSMENT     Pt presents for first session following initial eval . Pt responded well to above exercises and was able to complete with no adverse effects. Noted decreased mobility in R >L hip . Only min cues required with exercises with good response.     Grace Is progressing well towards her goals.   Pt prognosis is Good.     Pt will continue to benefit from skilled outpatient physical therapy to address the deficits listed in the problem list box on initial evaluation, provide pt/family education and to maximize pt's level of independence in the home and community environment.   Pt's spiritual, cultural and educational needs considered and pt agreeable to plan of care and goals.     Anticipated barriers to physical therapy: none    Goals:  Short Term Goals: 2 weeks   1. In 2 weeks, pt will be independent with HEP   2. In 2 weeks, pt will report being able to use exercises at home to decrease pain      Long Term Goals: 4 weeks   1. In 4 weeks, pt will be independent with updated HEP   2. In 4 weeks, pt will have R hip ROM the same as L ROM to prepare for surgery     PLAN     Continue to progress per PT protocol.     Ramonita Yates, PTA

## 2022-06-02 ENCOUNTER — CLINICAL SUPPORT (OUTPATIENT)
Dept: REHABILITATION | Facility: HOSPITAL | Age: 57
End: 2022-06-02
Payer: COMMERCIAL

## 2022-06-02 DIAGNOSIS — M62.81 MUSCLE WEAKNESS: Primary | ICD-10-CM

## 2022-06-02 DIAGNOSIS — R68.89 DECREASED FUNCTIONAL ACTIVITY TOLERANCE: ICD-10-CM

## 2022-06-02 PROCEDURE — 97110 THERAPEUTIC EXERCISES: CPT | Mod: CQ

## 2022-06-07 ENCOUNTER — CLINICAL SUPPORT (OUTPATIENT)
Dept: REHABILITATION | Facility: HOSPITAL | Age: 57
End: 2022-06-07
Payer: COMMERCIAL

## 2022-06-07 DIAGNOSIS — M62.81 MUSCLE WEAKNESS: Primary | ICD-10-CM

## 2022-06-07 DIAGNOSIS — R68.89 DECREASED FUNCTIONAL ACTIVITY TOLERANCE: ICD-10-CM

## 2022-06-07 PROCEDURE — 97110 THERAPEUTIC EXERCISES: CPT

## 2022-06-07 NOTE — PROGRESS NOTES
[facility]   Physical Therapy Treatment Note     Name: Grace Tuttleell  Clinic Number: [id number]    Therapy Diagnosis:   Encounter Diagnoses   Name Primary?    Muscle weakness Yes    Decreased functional activity tolerance      Physician: Lisa Quiroz MD    Visit Date: 6/7/2022    Physician Orders: PT Eval and Treat : Pre op Rehab for:  Right  Anterior Total Hip Replacement on [place] table     Medical Diagnosis from Referral: M25.551,M25.552 (ICD-10-CM) - Bilateral hip pain   Evaluation Date: 5/31/2022  Authorization Period Expiration: 5/2/2023  Plan of Care Expiration: 6/28/2022  Progress Note Due: 6/21/2022  Visit # / Visits authorized: 1/ 20  FOTO: 1/3    PTA Visit #: 0/5     Time In: 11:10am  Time Out: 12:00pm   Total time: 50 minutes   Total Billable Time: 50 minutes    Precautions: osteopetrosis, Anxiety      SUBJECTIVE     Pt reports: She is feeling ok today, no pain in the hips but some in the neck  She was compliant with home exercise program.  Response to previous treatment: good , no adverse effects   Functional change: none    Pain: 0/10  Location: B hips     OBJECTIVE     Objective Measures updated at progress report unless specified.     Treatment     Grace received the treatments listed below:      therapeutic exercises to develop strength, endurance, ROM, flexibility, posture and core stabilization for 50 minutes including:    Nu-Step : 6' L 2  Piriformis stretch : 3 x 30''   PPT : 2 x 10 reps , 5'' hold  PPT c/ supine clamshell , RTB: 3 x 10 reps   PPT c/ adduction ball squeeze : 3 x 10 reps , 5'' hold   Posterior pelvic tilts c/ bridge: 3 x 10 reps    Standing Hip ABD, yellow TB 2x10   Standing Hip extension, yellow TB 2x10   Lateral walks at counter yellow TB 3 laps        Patient Education and Home Exercises     Home Exercises Provided and Patient Education Provided     Education provided:   - HEP    Written Home Exercises Provided: yes. Exercises were  reviewed and Grace was able to demonstrate them prior to the end of the session.  Grace demonstrated good  understanding of the education provided. See EMR under Patient Instructions for exercises provided during therapy sessions    ASSESSMENT     Pt presents for first session following initial eval . Pt shows consistent improvement with core exercises. Hip exercises were performed in standing and with TB with no reports of pain. Will continue to progress     Grace Is progressing well towards her goals.   Pt prognosis is Good.     Pt will continue to benefit from skilled outpatient physical therapy to address the deficits listed in the problem list box on initial evaluation, provide pt/family education and to maximize pt's level of independence in the home and community environment.   Pt's spiritual, cultural and educational needs considered and pt agreeable to plan of care and goals.     Anticipated barriers to physical therapy: none    Goals:  Short Term Goals: 2 weeks   1. In 2 weeks, pt will be independent with HEP   2. In 2 weeks, pt will report being able to use exercises at home to decrease pain      Long Term Goals: 4 weeks   1. In 4 weeks, pt will be independent with updated HEP   2. In 4 weeks, pt will have R hip ROM the same as L ROM to prepare for surgery     PLAN     Continue to progress per PT protocol.     Diane Orosco, PT

## 2022-06-08 NOTE — PROGRESS NOTES
[facility]   Physical Therapy Treatment Note     Name: Grace Beltran  Clinic Number: [id number]    Therapy Diagnosis:   Encounter Diagnoses   Name Primary?    Muscle weakness Yes    Decreased functional activity tolerance      Physician: Lisa Quiroz MD    Visit Date: 6/9/2022    Physician Orders: PT Eval and Treat : Pre op Rehab for:  Right  Anterior Total Hip Replacement on [place] table     Medical Diagnosis from Referral: M25.551,M25.552 (ICD-10-CM) - Bilateral hip pain   Evaluation Date: 5/31/2022  Authorization Period Expiration: 12/30/2022  Plan of Care Expiration: 6/28/2022  Progress Note Due: 6/21/2022  Visit # / Visits authorized: 3/ 20  FOTO: 1/3    PTA Visit #: 1/5     Time In: 8:05 am   Time Out: 9:00 am  Total Billable Time: 55 minutes    Precautions: osteopetrosis, Anxiety      SUBJECTIVE     Pt reports: doing okay with no c/o pain currently .   She was compliant with home exercise program.  Response to previous treatment: good , no adverse effects   Functional change: none    Pain: 2/10  Location: B hips     OBJECTIVE     Objective Measures updated at progress report unless specified.     Treatment     Grace received the treatments listed below:      therapeutic exercises to develop strength, endurance, ROM, flexibility, posture and core stabilization for 55 minutes including:    Nu-Step : 6' L 2  Piriformis stretch : 3 x 30''   + Piriformis stretch pushing down on knee : 3 x 30'' B  PPT : 2 x 10 reps , 5'' hold  TrA activation x 10 reps , 5'' hold   TrA activation c/ supine clamshell , RTB: 3 x 10 reps   TrA activation c/ adduction ball squeeze : 3 x 10 reps , 5'' hold   Posterior pelvic tilts c/ bridge, RTB : 3 x 10 reps  , 3'' hold  Sidelying hip ABD c/ TrA  : 2 x 12 reps B   Prone hip extension : 2 x 12 reps B  + STS c/ blue airex : 2 x 10 reps       Patient Education and Home Exercises     Home Exercises Provided and Patient Education Provided     Education  provided:   - HEP    Written Home Exercises Provided: yes. Exercises were reviewed and Grace was able to demonstrate them prior to the end of the session.  Grace demonstrated good  understanding of the education provided. See EMR under Patient Instructions for exercises provided during therapy sessions    ASSESSMENT     Pt presents with slight improvement in symptoms reported and no pain currently. She is progressing well with BLE and hip strengthening and was able to complete session with no pain. She exhibits hip ER and IR tightness and benefits from stretches performed to address . Will continue to progress per tolerance .     Grace Is progressing well towards her goals.   Pt prognosis is Good.     Pt will continue to benefit from skilled outpatient physical therapy to address the deficits listed in the problem list box on initial evaluation, provide pt/family education and to maximize pt's level of independence in the home and community environment.   Pt's spiritual, cultural and educational needs considered and pt agreeable to plan of care and goals.     Anticipated barriers to physical therapy: none    Goals:  Short Term Goals: 2 weeks   1. In 2 weeks, pt will be independent with HEP   2. In 2 weeks, pt will report being able to use exercises at home to decrease pain      Long Term Goals: 4 weeks   1. In 4 weeks, pt will be independent with updated HEP   2. In 4 weeks, pt will have R hip ROM the same as L ROM to prepare for surgery     PLAN     Continue to progress per PT protocol.     Ramonita Yates, PTA

## 2022-06-09 ENCOUNTER — CLINICAL SUPPORT (OUTPATIENT)
Dept: REHABILITATION | Facility: HOSPITAL | Age: 57
End: 2022-06-09
Payer: COMMERCIAL

## 2022-06-09 DIAGNOSIS — R68.89 DECREASED FUNCTIONAL ACTIVITY TOLERANCE: ICD-10-CM

## 2022-06-09 DIAGNOSIS — M62.81 MUSCLE WEAKNESS: Primary | ICD-10-CM

## 2022-06-09 PROCEDURE — 97110 THERAPEUTIC EXERCISES: CPT | Mod: CQ

## 2022-06-14 ENCOUNTER — CLINICAL SUPPORT (OUTPATIENT)
Dept: REHABILITATION | Facility: HOSPITAL | Age: 57
End: 2022-06-14
Payer: COMMERCIAL

## 2022-06-14 DIAGNOSIS — R68.89 DECREASED FUNCTIONAL ACTIVITY TOLERANCE: ICD-10-CM

## 2022-06-14 DIAGNOSIS — M62.81 MUSCLE WEAKNESS: Primary | ICD-10-CM

## 2022-06-14 PROCEDURE — 97110 THERAPEUTIC EXERCISES: CPT

## 2022-06-14 NOTE — PROGRESS NOTES
[facility]   Physical Therapy Treatment Note     Name: Grace Beltran  Clinic Number: [id number]    Therapy Diagnosis:   Encounter Diagnoses   Name Primary?    Muscle weakness Yes    Decreased functional activity tolerance      Physician: Lisa Quiroz MD    Visit Date: 6/14/2022    Physician Orders: PT Eval and Treat : Pre op Rehab for:  Right  Anterior Total Hip Replacement on [place] table     Medical Diagnosis from Referral: M25.551,M25.552 (ICD-10-CM) - Bilateral hip pain   Evaluation Date: 5/31/2022  Authorization Period Expiration: 12/30/2022  Plan of Care Expiration: 6/28/2022  Progress Note Due: 6/21/2022  Visit # / Visits authorized: 4/ 20  FOTO: 1/3    PTA Visit #: 0/5     Time In: 4:05 pm   Time Out: 5:00 pm  Total Billable Time: 55 minutes    Precautions: osteopetrosis, Anxiety      SUBJECTIVE     Pt reports: doing well overall.   She was compliant with home exercise program.  Response to previous treatment: good , no adverse effects   Functional change: none    Pain: 2/10  Location: B hips     OBJECTIVE     Objective Measures updated at progress report unless specified.     Treatment     Grace received the treatments listed below:      therapeutic exercises to develop strength, endurance, ROM, flexibility, posture and core stabilization for 55 minutes including:  Nu-Step : 8' L 3  Sidelying hip ABD c/ TrA  :5 second hold 1x15 followed by 1x10 each  Posterior pelvic tilts c/ bridge, RTB : 3 x 10 reps  , 5'' hold  sidelying clams: 5 seconds, 2x15 RTB   Piriformis stretch : 3 x 30''     Not today:  + Piriformis stretch pushing down on knee : 3 x 30'' B  PPT : 2 x 10 reps , 5'' hold  TrA activation x 10 reps , 5'' hold   TrA activation c/ supine clamshell , RTB: 3 x 10 reps   Sidelying hip ABD c/ TrA  :5 second hold 2x15  Prone hip extension : 2 x 12 reps B  + STS c/ blue airex : 2 x 10 reps       Patient Education and Home Exercises     Home Exercises Provided and  Patient Education Provided     Education provided:   - HEP    Written Home Exercises Provided: yes. Exercises were reviewed and Grace was able to demonstrate them prior to the end of the session.  Grace demonstrated good  understanding of the education provided. See EMR under Patient Instructions for exercises provided during therapy sessions    ASSESSMENT     Emphasized posterolateral hip strength and endurance. Patient tolerated session well without increased pain and was appropriately challenged. Will resume today's activities pending patient response; consider reducing reps if necessary next visit.    Grace Is progressing well towards her goals.   Pt prognosis is Good.     Pt will continue to benefit from skilled outpatient physical therapy to address the deficits listed in the problem list box on initial evaluation, provide pt/family education and to maximize pt's level of independence in the home and community environment.   Pt's spiritual, cultural and educational needs considered and pt agreeable to plan of care and goals.     Anticipated barriers to physical therapy: none    Goals:  Short Term Goals: 2 weeks   1. In 2 weeks, pt will be independent with HEP   2. In 2 weeks, pt will report being able to use exercises at home to decrease pain      Long Term Goals: 4 weeks   1. In 4 weeks, pt will be independent with updated HEP   2. In 4 weeks, pt will have R hip ROM the same as L ROM to prepare for surgery     PLAN     Continue to progress per PT protocol.     Neeta Martines, PT

## 2022-06-23 NOTE — PROGRESS NOTES
[facility]   Physical Therapy Treatment Note     Name: Grace Beltran  Clinic Number: [id number]    Therapy Diagnosis:   Encounter Diagnoses   Name Primary?    Muscle weakness Yes    Decreased functional activity tolerance      Physician: Lisa Quiroz MD    Visit Date: 6/24/2022    Physician Orders: PT Eval and Treat : Pre op Rehab for:  Right  Anterior Total Hip Replacement on [place] table     Medical Diagnosis from Referral: M25.551,M25.552 (ICD-10-CM) - Bilateral hip pain   Evaluation Date: 5/31/2022  Authorization Period Expiration: 12/30/2022  Plan of Care Expiration: 6/28/2022  Progress Note Due: 6/21/2022  Visit # / Visits authorized: 5/ 20  FOTO: 1/3    PTA Visit #: 1/5     Time In: 10:00 am  Time Out: 10:55 am  Total Billable Time: 30 minutes    Precautions: osteopetrosis, Anxiety      SUBJECTIVE     Pt reports: she has been on a work trip all week and is sore from doing a lot of walking .   She was compliant with home exercise program.  Response to previous treatment: good , no adverse effects   Functional change: none    Pain: 2/10  Location: B hips     OBJECTIVE     Objective Measures updated at progress report unless specified.     Treatment     Grace received the treatments listed below:      therapeutic exercises to develop strength, endurance, ROM, flexibility, posture and core stabilization for 55 minutes including:  Nu-Step : 8' L 3 - NP  Recumbent Bike : 6' L2  Sidelying hip ABD c/ TrA : 3 x 10 reps , 3-5'' hold   Posterior pelvic tilts c/ bridge, RTB : 3 x 10 reps  , 5'' hold  sidelying clams: 5 seconds, 2x15 RTB   Reverse clamshells : 2 x 10 reps   Prone hip extension : 2 x 12 reps B  Piriformis stretch : 3 x 30''     Not today:  Piriformis stretch pushing down on knee : 3 x 30'' B  TrA activation x 10 reps , 5'' hold   TrA activation c/ supine clamshell , RTB: 3 x 10 reps   STS c/ blue airex : 2 x 10 reps       Patient Education and Home Exercises     Home  Exercises Provided and Patient Education Provided     Education provided:   - HEP    Written Home Exercises Provided: yes. Exercises were reviewed and Grace was able to demonstrate them prior to the end of the session.  Grace demonstrated good  understanding of the education provided. See EMR under Patient Instructions for exercises provided during therapy sessions    ASSESSMENT     Visible reduction in mobility in R vs L hip . Pt responded well to exercises performed above with appropriate muscle response noted post session. Good response to addition of reverse clamshells.     Grace Is progressing well towards her goals.   Pt prognosis is Good.     Pt will continue to benefit from skilled outpatient physical therapy to address the deficits listed in the problem list box on initial evaluation, provide pt/family education and to maximize pt's level of independence in the home and community environment.   Pt's spiritual, cultural and educational needs considered and pt agreeable to plan of care and goals.     Anticipated barriers to physical therapy: none    Goals:  Short Term Goals: 2 weeks   1. In 2 weeks, pt will be independent with HEP   2. In 2 weeks, pt will report being able to use exercises at home to decrease pain      Long Term Goals: 4 weeks   1. In 4 weeks, pt will be independent with updated HEP   2. In 4 weeks, pt will have R hip ROM the same as L ROM to prepare for surgery     PLAN     Continue to progress per PT protocol.     Ramonita Yates, PTA

## 2022-06-24 ENCOUNTER — CLINICAL SUPPORT (OUTPATIENT)
Dept: REHABILITATION | Facility: HOSPITAL | Age: 57
End: 2022-06-24
Payer: COMMERCIAL

## 2022-06-24 DIAGNOSIS — M62.81 MUSCLE WEAKNESS: Primary | ICD-10-CM

## 2022-06-24 DIAGNOSIS — R68.89 DECREASED FUNCTIONAL ACTIVITY TOLERANCE: ICD-10-CM

## 2022-06-24 PROCEDURE — 97110 THERAPEUTIC EXERCISES: CPT | Mod: CQ

## 2022-06-28 ENCOUNTER — CLINICAL SUPPORT (OUTPATIENT)
Dept: REHABILITATION | Facility: HOSPITAL | Age: 57
End: 2022-06-28
Payer: COMMERCIAL

## 2022-06-28 DIAGNOSIS — R68.89 DECREASED FUNCTIONAL ACTIVITY TOLERANCE: ICD-10-CM

## 2022-06-28 DIAGNOSIS — M62.81 MUSCLE WEAKNESS: Primary | ICD-10-CM

## 2022-06-28 PROCEDURE — 97110 THERAPEUTIC EXERCISES: CPT

## 2022-06-28 NOTE — PROGRESS NOTES
[facility]   Physical Therapy Treatment Note/ Discharge     Name: Grace Beltran  Clinic Number: [id number]    Therapy Diagnosis:   Encounter Diagnoses   Name Primary?    Muscle weakness Yes    Decreased functional activity tolerance      Physician: Lisa Quiroz MD    Visit Date: 6/28/2022    Physician Orders: PT Eval and Treat : Pre op Rehab for:  Right  Anterior Total Hip Replacement on [place] table     Medical Diagnosis from Referral: M25.551,M25.552 (ICD-10-CM) - Bilateral hip pain   Evaluation Date: 5/31/2022  Authorization Period Expiration: 12/30/2022  Plan of Care Expiration: 6/28/2022  Progress Note Due: 6/21/2022  Visit # / Visits authorized: 6/ 20  FOTO: 2/3    PTA Visit #:0/5     Time In: 2:03pm   Time Out: 2:33pm   Total Billable Time: 30 minutes    Precautions: osteopetrosis, Anxiety      SUBJECTIVE     Pt reports: Feeling pretty good. Felt fine after last session. She was a bit sore from her work trip in [facility]. Overall, she feels like therapy has gone well. She feels like she is more flexible and doesn't have the same clicking.   She was compliant with home exercise program.  Response to previous treatment: good , no adverse effects   Functional change: none    Pain: 1/10  Location: B hips     OBJECTIVE     Active/Passive Hip ROM: (measured in degrees)    RLE LLE   Flexion 120 130          Treatment     Grace received the treatments listed below:       therapeutic exercises to develop strength, endurance, ROM, flexibility, posture and core stabilization for 30 minutes including:  Bridge x10   Bridge + March x10   PPT 2x10   PPT+ march 2x10   PPT+ ball squeeze 2x10   Reverse clamshells x10    clamshells 2x10       Patient Education and Home Exercises     Home Exercises Provided and Patient Education Provided     Education provided:   - HEP    Written Home Exercises Provided: yes. Exercises were reviewed and Grace was able to demonstrate them prior to the end  of the session.  Grace demonstrated good  understanding of the education provided. See EMR under Patient Instructions for exercises provided during therapy sessions    ASSESSMENT     Pt shows improved hip ROM, decreased pain, and independence with using exercises at home. Pt in agreement to transition to HEP. Pt was able to perform all exercises on HEP and was given clinic number. Pt encourgaed to reach out to clinic with any questions or concerns. Pt discharged at this time     Discharge FOTO: 43% limited    Grace Is progressing well towards her goals.   Pt prognosis is Good.     Pt will continue to benefit from skilled outpatient physical therapy to address the deficits listed in the problem list box on initial evaluation, provide pt/family education and to maximize pt's level of independence in the home and community environment.   Pt's spiritual, cultural and educational needs considered and pt agreeable to plan of care and goals.     Anticipated barriers to physical therapy: none    Goals:  Short Term Goals: 2 weeks   1. In 2 weeks, pt will be independent with HEP MET   2. In 2 weeks, pt will report being able to use exercises at home to decrease pain MET     Long Term Goals: 4 weeks   1. In 4 weeks, pt will be independent with updated HEP MET  2. In 4 weeks, pt will have R hip ROM the same as L ROM to prepare for surgery- ROM increased, but not even between both LEs    PLAN     Pt discharged at this time     Diane Orosco PT

## 2022-07-07 RX ORDER — CITALOPRAM 40 MG/1
TABLET, FILM COATED ORAL
Qty: 90 TABLET | Refills: 2 | Status: SHIPPED | OUTPATIENT
Start: 2022-07-07 | End: 2023-04-05

## 2022-07-07 NOTE — TELEPHONE ENCOUNTER
Refill Authorization Note   Grace Tuttleell  is requesting a refill authorization.  Brief Assessment and Rationale for Refill:  Approve     Medication Therapy Plan:       Medication Reconciliation Completed: No   Comments:     No Care Gaps recommended.     Note composed:4:21 PM 07/07/2022

## 2022-07-07 NOTE — TELEPHONE ENCOUNTER
No new care gaps identified.  [facility] Embedded Care Gaps. Reference number: [id number]. 7/07/2022   12:02:11 PM CDT

## 2022-07-29 ENCOUNTER — OFFICE VISIT (OUTPATIENT)
Dept: DERMATOLOGY | Facility: CLINIC | Age: 57
End: 2022-07-29
Payer: COMMERCIAL

## 2022-07-29 DIAGNOSIS — L29.9 BRACHIORADIAL PRURITUS: Primary | ICD-10-CM

## 2022-07-29 PROCEDURE — 1160F PR REVIEW ALL MEDS BY PRESCRIBER/CLIN PHARMACIST DOCUMENTED: ICD-10-PCS | Mod: CPTII,95,, | Performed by: STUDENT IN AN ORGANIZED HEALTH CARE EDUCATION/TRAINING PROGRAM

## 2022-07-29 PROCEDURE — 1159F MED LIST DOCD IN RCRD: CPT | Mod: CPTII,95,, | Performed by: STUDENT IN AN ORGANIZED HEALTH CARE EDUCATION/TRAINING PROGRAM

## 2022-07-29 PROCEDURE — 99213 PR OFFICE/OUTPT VISIT, EST, LEVL III, 20-29 MIN: ICD-10-PCS | Mod: 95,,, | Performed by: STUDENT IN AN ORGANIZED HEALTH CARE EDUCATION/TRAINING PROGRAM

## 2022-07-29 PROCEDURE — 99213 OFFICE O/P EST LOW 20 MIN: CPT | Mod: 95,,, | Performed by: STUDENT IN AN ORGANIZED HEALTH CARE EDUCATION/TRAINING PROGRAM

## 2022-07-29 PROCEDURE — 1159F PR MEDICATION LIST DOCUMENTED IN MEDICAL RECORD: ICD-10-PCS | Mod: CPTII,95,, | Performed by: STUDENT IN AN ORGANIZED HEALTH CARE EDUCATION/TRAINING PROGRAM

## 2022-07-29 PROCEDURE — 1160F RVW MEDS BY RX/DR IN RCRD: CPT | Mod: CPTII,95,, | Performed by: STUDENT IN AN ORGANIZED HEALTH CARE EDUCATION/TRAINING PROGRAM

## 2022-07-29 NOTE — PROGRESS NOTES
Patient Information  Name: Grace Beltran  : 1965  MRN: [id number]     Referring Physician:  Dr. Saucedo ref. provider found   Primary Care Physician:  Dr. Evelia Wynn MD   Date of Visit: 2022      Subjective:       Grace Beltran is a 56 y.o. female who presents for itching    HPI  The patient location is: [place], [place]  The chief complaint leading to consultation is: itching    Visit type: audiovisual    Face to Face time with patient: 10 min  12 minutes of total time spent on the encounter, which includes face to face time and non-face to face time preparing to see the patient (eg, review of tests), Obtaining and/or reviewing separately obtained history, Documenting clinical information in the electronic or other health record, Independently interpreting results (not separately reported) and communicating results to the patient/family/caregiver, or Care coordination (not separately reported).     Each patient to whom he or she provides medical services by telemedicine is:  (1) informed of the relationship between the physician and patient and the respective role of any other health care provider with respect to management of the patient; and (2) notified that he or she may decline to receive medical services by telemedicine and may withdraw from such care at any time.    Notes:   Patient with new complaint of lesion(s)  Location: right and left forearms  Duration: few months  Symptoms: itching  Relieving factors/Previous treatments: none    Patient was last seen:Visit date not found     Prior notes by myself reviewed.   Clinical documentation obtained by nursing staff reviewed.    Review of Systems   Skin: Positive for itching. Negative for rash.        Objective:    Physical Exam   Constitutional: She appears well-developed and well-nourished. No distress.   Neurological: She is alert and oriented to person, place, and time. She is not disoriented.   Psychiatric: She has a normal mood and  affect.   Skin:   Areas Examined (abnormalities noted in diagram):   RUE Inspected              Diagram Legend     Erythematous scaling macule/papule c/w actinic keratosis       Vascular papule c/w angioma      Pigmented verrucoid papule/plaque c/w seborrheic keratosis      Yellow umbilicated papule c/w sebaceous hyperplasia      Irregularly shaped tan macule c/w lentigo     1-2 mm smooth white papules consistent with Milia      Movable subcutaneous cyst with punctum c/w epidermal inclusion cyst      Subcutaneous movable cyst c/w pilar cyst      Firm pink to brown papule c/w dermatofibroma      Pedunculated fleshy papule(s) c/w skin tag(s)      Evenly pigmented macule c/w junctional nevus     Mildly variegated pigmented, slightly irregular-bordered macule c/w mildly atypical nevus      Flesh colored to evenly pigmented papule c/w intradermal nevus       Pink pearly papule/plaque c/w basal cell carcinoma      Erythematous hyperkeratotic cursted plaque c/w SCC      Surgical scar with no sign of skin cancer recurrence      Open and closed comedones      Inflammatory papules and pustules      Verrucoid papule consistent consistent with wart     Erythematous eczematous patches and plaques     Dystrophic onycholytic nail with subungual debris c/w onychomycosis     Umbilicated papule    Erythematous-base heme-crusted tan verrucoid plaque consistent with inflamed seborrheic keratosis     Erythematous Silvery Scaling Plaque c/w Psoriasis     See annotation          [] Data reviewed  [] Independent review of test  [] Management discussed with another provider    Assessment / Plan:        Brachioradial pruritus  - Recommends capsaizin cream  - Recommends discussing with PCP for evaluation of cervical spine for pinched nerve/spinal stenosis         LOS NUMBER AND COMPLEXITY OF PROBLEMS    COMPLEXITY OF DATA RISK TOTAL TIME (m)   [id number] [] 1 self-limited or minor problem [x] Minimal to none [] No treatment recommended or  patient to monitor 15-29  10-19   02467  71664 Low  [] 2 or > self limited or minor problems  [] 1 stable chronic illness  [x] 1 acute, uncomplicated illness or injury Limited (2)  [] Prior external notes from each unique source  [] Review result of each unique test  [] Order each unique test [x]  Low  OTC medications, minor skin biopsy 30-44  20-29   73007  02669 Moderate  []  1 or > chronic illness with progression, exacerbation or SE of treatment  []  2 or more stable chronic illnesses  []  1 acute illness with systemic symptoms  []  1 acute complicated injury  []  1 undiagnosed new problem with uncertain prognosis Moderate (1/3 below)  []  3 or more data items        *Now includes assessment requiring independent historian  []  Independent interpretation of a test  []  Discuss management/test with another provider Moderate  []  Prescription drug mgmt  []  Minor surgery with risk discussed  []  Mgmt limited by social determinates 45-59  30-39   94301  12744 High  []  1 or more chronic illness with severe exacerbation, progression or SE of treatment  []  1 acute or chronic illness/injury that poses a threat to life or bodily function Extensive (2/3 below)  []  3 or more data items        *Now includes assessment requiring independent historian.  []  Independent interpretation of a test  []  Discuss management/test with another provider High  []  Major surgery with risk discussed  []  Drug therapy requiring intensive monitoring for toxicity  []  Hospitalization  []  Decision for DNR 60-74  40-54      No follow-ups on file.    Evette Gray MD, FAAD  [facility]

## 2022-08-11 ENCOUNTER — OFFICE VISIT (OUTPATIENT)
Dept: PRIMARY CARE CLINIC | Facility: CLINIC | Age: 57
End: 2022-08-11
Payer: COMMERCIAL

## 2022-08-11 VITALS
DIASTOLIC BLOOD PRESSURE: 78 MMHG | SYSTOLIC BLOOD PRESSURE: 118 MMHG | OXYGEN SATURATION: 97 % | HEIGHT: 63 IN | TEMPERATURE: 98 F | WEIGHT: 152.75 LBS | BODY MASS INDEX: 27.07 KG/M2 | HEART RATE: 69 BPM

## 2022-08-11 DIAGNOSIS — M25.552 BILATERAL HIP PAIN: ICD-10-CM

## 2022-08-11 DIAGNOSIS — L29.9 BRACHIORADIAL PRURITUS: ICD-10-CM

## 2022-08-11 DIAGNOSIS — M25.551 BILATERAL HIP PAIN: ICD-10-CM

## 2022-08-11 DIAGNOSIS — M47.22 OSTEOARTHRITIS OF SPINE WITH RADICULOPATHY, CERVICAL REGION: Primary | ICD-10-CM

## 2022-08-11 DIAGNOSIS — M81.8 OTHER OSTEOPOROSIS WITHOUT CURRENT PATHOLOGICAL FRACTURE: ICD-10-CM

## 2022-08-11 PROCEDURE — 99214 PR OFFICE/OUTPT VISIT, EST, LEVL IV, 30-39 MIN: ICD-10-PCS | Mod: S$GLB,,, | Performed by: FAMILY MEDICINE

## 2022-08-11 PROCEDURE — 3078F PR MOST RECENT DIASTOLIC BLOOD PRESSURE < 80 MM HG: ICD-10-PCS | Mod: CPTII,S$GLB,, | Performed by: FAMILY MEDICINE

## 2022-08-11 PROCEDURE — 3074F SYST BP LT 130 MM HG: CPT | Mod: CPTII,S$GLB,, | Performed by: FAMILY MEDICINE

## 2022-08-11 PROCEDURE — 1159F PR MEDICATION LIST DOCUMENTED IN MEDICAL RECORD: ICD-10-PCS | Mod: CPTII,S$GLB,, | Performed by: FAMILY MEDICINE

## 2022-08-11 PROCEDURE — 3078F DIAST BP <80 MM HG: CPT | Mod: CPTII,S$GLB,, | Performed by: FAMILY MEDICINE

## 2022-08-11 PROCEDURE — 3074F PR MOST RECENT SYSTOLIC BLOOD PRESSURE < 130 MM HG: ICD-10-PCS | Mod: CPTII,S$GLB,, | Performed by: FAMILY MEDICINE

## 2022-08-11 PROCEDURE — 1160F PR REVIEW ALL MEDS BY PRESCRIBER/CLIN PHARMACIST DOCUMENTED: ICD-10-PCS | Mod: CPTII,S$GLB,, | Performed by: FAMILY MEDICINE

## 2022-08-11 PROCEDURE — 99999 PR PBB SHADOW E&M-EST. PATIENT-LVL III: CPT | Mod: PBBFAC,,, | Performed by: FAMILY MEDICINE

## 2022-08-11 PROCEDURE — 3008F BODY MASS INDEX DOCD: CPT | Mod: CPTII,S$GLB,, | Performed by: FAMILY MEDICINE

## 2022-08-11 PROCEDURE — 1160F RVW MEDS BY RX/DR IN RCRD: CPT | Mod: CPTII,S$GLB,, | Performed by: FAMILY MEDICINE

## 2022-08-11 PROCEDURE — 99999 PR PBB SHADOW E&M-EST. PATIENT-LVL III: ICD-10-PCS | Mod: PBBFAC,,, | Performed by: FAMILY MEDICINE

## 2022-08-11 PROCEDURE — 3008F PR BODY MASS INDEX (BMI) DOCUMENTED: ICD-10-PCS | Mod: CPTII,S$GLB,, | Performed by: FAMILY MEDICINE

## 2022-08-11 PROCEDURE — 99214 OFFICE O/P EST MOD 30 MIN: CPT | Mod: S$GLB,,, | Performed by: FAMILY MEDICINE

## 2022-08-11 PROCEDURE — 1159F MED LIST DOCD IN RCRD: CPT | Mod: CPTII,S$GLB,, | Performed by: FAMILY MEDICINE
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
## 2022-10-17 ENCOUNTER — PATIENT MESSAGE (OUTPATIENT)
Dept: INTERNAL MEDICINE | Facility: CLINIC | Age: 57
End: 2022-10-17

## 2022-10-17 ENCOUNTER — OFFICE VISIT (OUTPATIENT)
Dept: INTERNAL MEDICINE | Facility: CLINIC | Age: 57
End: 2022-10-17
Payer: COMMERCIAL

## 2022-10-17 VITALS
TEMPERATURE: 98 F | BODY MASS INDEX: 26.34 KG/M2 | HEART RATE: 98 BPM | DIASTOLIC BLOOD PRESSURE: 82 MMHG | OXYGEN SATURATION: 96 % | RESPIRATION RATE: 18 BRPM | WEIGHT: 154.31 LBS | SYSTOLIC BLOOD PRESSURE: 122 MMHG | HEIGHT: 64 IN

## 2022-10-17 DIAGNOSIS — R05.1 ACUTE COUGH: ICD-10-CM

## 2022-10-17 DIAGNOSIS — R52 GENERALIZED BODY ACHES: Primary | ICD-10-CM

## 2022-10-17 DIAGNOSIS — Z20.822 ENCOUNTER FOR LABORATORY TESTING FOR COVID-19 VIRUS: ICD-10-CM

## 2022-10-17 LAB
INFLUENZA A, MOLECULAR: NEGATIVE
INFLUENZA B, MOLECULAR: NEGATIVE
SARS-COV-2 RNA RESP QL NAA+PROBE: NOT DETECTED
SPECIMEN SOURCE: NORMAL

## 2022-10-17 PROCEDURE — 3074F PR MOST RECENT SYSTOLIC BLOOD PRESSURE < 130 MM HG: ICD-10-PCS | Mod: CPTII,S$GLB,, | Performed by: NURSE PRACTITIONER

## 2022-10-17 PROCEDURE — U0003 INFECTIOUS AGENT DETECTION BY NUCLEIC ACID (DNA OR RNA); SEVERE ACUTE RESPIRATORY SYNDROME CORONAVIRUS 2 (SARS-COV-2) (CORONAVIRUS DISEASE [COVID-19]), AMPLIFIED PROBE TECHNIQUE, MAKING USE OF HIGH THROUGHPUT TECHNOLOGIES AS DESCRIBED BY CMS-2020-01-R: HCPCS | Performed by: NURSE PRACTITIONER

## 2022-10-17 PROCEDURE — 87502 INFLUENZA DNA AMP PROBE: CPT | Performed by: NURSE PRACTITIONER

## 2022-10-17 PROCEDURE — 3079F DIAST BP 80-89 MM HG: CPT | Mod: CPTII,S$GLB,, | Performed by: NURSE PRACTITIONER

## 2022-10-17 PROCEDURE — U0005 INFEC AGEN DETEC AMPLI PROBE: HCPCS | Performed by: NURSE PRACTITIONER

## 2022-10-17 PROCEDURE — 1160F PR REVIEW ALL MEDS BY PRESCRIBER/CLIN PHARMACIST DOCUMENTED: ICD-10-PCS | Mod: CPTII,S$GLB,, | Performed by: NURSE PRACTITIONER

## 2022-10-17 PROCEDURE — 3074F SYST BP LT 130 MM HG: CPT | Mod: CPTII,S$GLB,, | Performed by: NURSE PRACTITIONER

## 2022-10-17 PROCEDURE — 3079F PR MOST RECENT DIASTOLIC BLOOD PRESSURE 80-89 MM HG: ICD-10-PCS | Mod: CPTII,S$GLB,, | Performed by: NURSE PRACTITIONER

## 2022-10-17 PROCEDURE — 1159F MED LIST DOCD IN RCRD: CPT | Mod: CPTII,S$GLB,, | Performed by: NURSE PRACTITIONER

## 2022-10-17 PROCEDURE — 99999 PR PBB SHADOW E&M-EST. PATIENT-LVL III: ICD-10-PCS | Mod: PBBFAC,,, | Performed by: NURSE PRACTITIONER

## 2022-10-17 PROCEDURE — 99999 PR PBB SHADOW E&M-EST. PATIENT-LVL III: CPT | Mod: PBBFAC,,, | Performed by: NURSE PRACTITIONER

## 2022-10-17 PROCEDURE — 1159F PR MEDICATION LIST DOCUMENTED IN MEDICAL RECORD: ICD-10-PCS | Mod: CPTII,S$GLB,, | Performed by: NURSE PRACTITIONER

## 2022-10-17 PROCEDURE — 1160F RVW MEDS BY RX/DR IN RCRD: CPT | Mod: CPTII,S$GLB,, | Performed by: NURSE PRACTITIONER

## 2022-10-17 PROCEDURE — 99213 PR OFFICE/OUTPT VISIT, EST, LEVL III, 20-29 MIN: ICD-10-PCS | Mod: S$GLB,,, | Performed by: NURSE PRACTITIONER

## 2022-10-17 PROCEDURE — 99213 OFFICE O/P EST LOW 20 MIN: CPT | Mod: S$GLB,,, | Performed by: NURSE PRACTITIONER

## 2022-10-17 RX ORDER — BENZONATATE 200 MG/1
200 CAPSULE ORAL 3 TIMES DAILY PRN
Qty: 30 CAPSULE | Refills: 0 | Status: SHIPPED | OUTPATIENT
Start: 2022-10-17 | End: 2022-10-27

## 2022-10-17 NOTE — PROGRESS NOTES
"Subjective:       Patient ID: Grace Beltran is a 57 y.o. female.    Chief Complaint: Cough (Productive cough; sx began Sunday morning 10/16/22. Pt state exposed to several ppl with Flu), Nasal Congestion, Sore Throat ("Itchy" throat), and Chills (Intermittently w/body aches)      Patient is a 57 y.o. female who traditionally follows with Evelia Wynn MD presenting today for:    Upper Respiratory Infection  Patient complains of symptoms of a URI. Symptoms include cough described, no  fever, and chills with body aches. Onset of symptoms was 1 days ago, and has been rapidly worsening since that time.     Patient has been exposed to Flu last week and as recently as Saturday. Did take home COVID test this AM that was negative but symptoms just began yesterday AM.     Review of patient's allergies indicates:   Allergen Reactions    Codeine      Other reaction(s): Nausea    Hydrocodone      Other reaction(s): Vomiting  Other reaction(s): Nausea    Promethazine      Other reaction(s): Vomiting  Other reaction(s): Nausea       Medication List with Changes/Refills   New Medications    BENZONATATE (TESSALON) 200 MG CAPSULE    Take 1 capsule (200 mg total) by mouth 3 (three) times daily as needed for Cough.   Current Medications    CITALOPRAM (CELEXA) 40 MG TABLET    TAKE 1 TABLET BY MOUTH EVERY DAY     Medical, social and surgical history has been reviewed with the patient.      Review of Systems   Constitutional:  Positive for chills.   HENT:  Negative for ear pain and sore throat ("scratchy").    Respiratory:  Positive for cough.    Musculoskeletal:  Positive for myalgias.     Objective:   /82 (BP Location: Right arm, Patient Position: Sitting, BP Method: Medium (Manual))   Pulse 98   Temp 97.7 °F (36.5 °C) (Temporal)   Resp 18   Ht 5' 4" (1.626 m)   Wt 70 kg (154 lb 5.2 oz)   LMP 11/29/2016 (Approximate)   SpO2 96%   BMI 26.49 kg/m²     Physical Exam  Vitals reviewed.   Constitutional:       Appearance: " Normal appearance.   HENT:      Head: Normocephalic and atraumatic.      Left Ear: There is impacted cerumen.      Ears:      Comments: Right TM partially visualized through cerumen and appears WNL     Nose: Rhinorrhea present. Rhinorrhea is clear.      Right Turbinates: Swollen.      Left Turbinates: Swollen.      Mouth/Throat:      Pharynx: Posterior oropharyngeal erythema present.   Cardiovascular:      Rate and Rhythm: Normal rate and regular rhythm.      Heart sounds: Normal heart sounds. No murmur heard.  Pulmonary:      Effort: Pulmonary effort is normal.      Breath sounds: Normal breath sounds. No wheezing.   Skin:     General: Skin is warm and dry.   Neurological:      Mental Status: She is alert and oriented to person, place, and time.       Last Labs:  Glucose   Date Value Ref Range Status   02/15/2022 86 70 - 110 mg/dL Final   12/10/2020 88 70 - 110 mg/dL Final     BUN   Date Value Ref Range Status   02/15/2022 13 6 - 20 mg/dL Final   12/10/2020 13 6 - 20 mg/dL Final     Creatinine   Date Value Ref Range Status   02/15/2022 0.8 0.5 - 1.4 mg/dL Final   12/10/2020 0.8 0.5 - 1.4 mg/dL Final     Cholesterol   Date Value Ref Range Status   02/15/2022 227 (H) 120 - 199 mg/dL Final     Comment:     The National Cholesterol Education Program (NCEP) has set the  following guidelines (reference ranges) for Cholesterol:  Optimal.....................<200 mg/dL  Borderline High.............200-239 mg/dL  High........................> or = 240 mg/dL     12/10/2020 215 (H) 120 - 199 mg/dL Final     Comment:     The National Cholesterol Education Program (NCEP) has set the  following guidelines (reference ranges) for Cholesterol:  Optimal.....................<200 mg/dL  Borderline High.............200-239 mg/dL  High........................> or = 240 mg/dL       No results found for: HGBA1C  Hemoglobin   Date Value Ref Range Status   02/15/2022 14.2 12.0 - 16.0 g/dL Final   12/10/2020 13.7 12.0 - 16.0 g/dL Final      Hematocrit   Date Value Ref Range Status   02/15/2022 43.2 37.0 - 48.5 % Final   12/10/2020 41.4 37.0 - 48.5 % Final       I have reviewed the following:     Details / Date    [x]   Labs     []   Micro     []   Pathology     []   Imaging     []   Cardiology Procedures     []   Other        Assessment and Plan:     1. Generalized body aches  - Influenza A & B by Molecular    2. Acute cough  - benzonatate (TESSALON) 200 MG capsule; Take 1 capsule (200 mg total) by mouth 3 (three) times daily as needed for Cough.  Dispense: 30 capsule; Refill: 0    3. Encounter for laboratory testing for COVID-19 virus  - COVID-19 Routine Screening    Patient advised to take Tessalon for cough. Add antihistamine for sore throat and congestion. Take Tylenol/Ibuprofen as needed for body aches and/or fever.

## 2023-01-09 ENCOUNTER — PATIENT MESSAGE (OUTPATIENT)
Dept: ENDOCRINOLOGY | Facility: CLINIC | Age: 58
End: 2023-01-09
Payer: COMMERCIAL

## 2023-01-09 DIAGNOSIS — E55.9 VITAMIN D DEFICIENCY: ICD-10-CM

## 2023-01-09 DIAGNOSIS — M81.8 OTHER OSTEOPOROSIS WITHOUT CURRENT PATHOLOGICAL FRACTURE: Primary | ICD-10-CM

## 2023-01-17 ENCOUNTER — PATIENT MESSAGE (OUTPATIENT)
Dept: ENDOCRINOLOGY | Facility: CLINIC | Age: 58
End: 2023-01-17
Payer: COMMERCIAL

## 2023-01-18 ENCOUNTER — PATIENT MESSAGE (OUTPATIENT)
Dept: ENDOCRINOLOGY | Facility: CLINIC | Age: 58
End: 2023-01-18
Payer: COMMERCIAL

## 2023-01-19 ENCOUNTER — PATIENT MESSAGE (OUTPATIENT)
Dept: ENDOCRINOLOGY | Facility: CLINIC | Age: 58
End: 2023-01-19
Payer: COMMERCIAL

## 2023-01-20 ENCOUNTER — LAB VISIT (OUTPATIENT)
Dept: LAB | Facility: HOSPITAL | Age: 58
End: 2023-01-20
Attending: HOSPITALIST
Payer: COMMERCIAL

## 2023-01-20 DIAGNOSIS — M81.8 OTHER OSTEOPOROSIS WITHOUT CURRENT PATHOLOGICAL FRACTURE: ICD-10-CM

## 2023-01-20 DIAGNOSIS — E55.9 VITAMIN D DEFICIENCY: ICD-10-CM

## 2023-01-20 LAB
25(OH)D3+25(OH)D2 SERPL-MCNC: 23 NG/ML (ref 30–96)
ALBUMIN SERPL BCP-MCNC: 3.8 G/DL (ref 3.5–5.2)
ALP SERPL-CCNC: 82 U/L (ref 55–135)
ALT SERPL W/O P-5'-P-CCNC: 21 U/L (ref 10–44)
ANION GAP SERPL CALC-SCNC: 6 MMOL/L (ref 8–16)
AST SERPL-CCNC: 20 U/L (ref 10–40)
BILIRUB SERPL-MCNC: 0.4 MG/DL (ref 0.1–1)
BUN SERPL-MCNC: 10 MG/DL (ref 6–20)
CALCIUM SERPL-MCNC: 9.7 MG/DL (ref 8.7–10.5)
CHLORIDE SERPL-SCNC: 102 MMOL/L (ref 95–110)
CO2 SERPL-SCNC: 31 MMOL/L (ref 23–29)
CREAT SERPL-MCNC: 0.8 MG/DL (ref 0.5–1.4)
EST. GFR  (NO RACE VARIABLE): >60 ML/MIN/1.73 M^2
GLUCOSE SERPL-MCNC: 90 MG/DL (ref 70–110)
MAGNESIUM SERPL-MCNC: 2 MG/DL (ref 1.6–2.6)
PHOSPHATE SERPL-MCNC: 3.6 MG/DL (ref 2.7–4.5)
POTASSIUM SERPL-SCNC: 4.5 MMOL/L (ref 3.5–5.1)
PROT SERPL-MCNC: 6.9 G/DL (ref 6–8.4)
PTH-INTACT SERPL-MCNC: 86.1 PG/ML (ref 9–77)
SODIUM SERPL-SCNC: 139 MMOL/L (ref 136–145)

## 2023-01-20 PROCEDURE — 80053 COMPREHEN METABOLIC PANEL: CPT | Performed by: HOSPITALIST

## 2023-01-20 PROCEDURE — 82306 VITAMIN D 25 HYDROXY: CPT | Performed by: HOSPITALIST

## 2023-01-20 PROCEDURE — 83735 ASSAY OF MAGNESIUM: CPT | Performed by: HOSPITALIST

## 2023-01-20 PROCEDURE — 36415 COLL VENOUS BLD VENIPUNCTURE: CPT | Mod: PN | Performed by: HOSPITALIST

## 2023-01-20 PROCEDURE — 83970 ASSAY OF PARATHORMONE: CPT | Performed by: HOSPITALIST

## 2023-01-20 PROCEDURE — 84100 ASSAY OF PHOSPHORUS: CPT | Performed by: HOSPITALIST

## 2023-02-06 ENCOUNTER — APPOINTMENT (OUTPATIENT)
Dept: RADIOLOGY | Facility: CLINIC | Age: 58
End: 2023-02-06
Attending: HOSPITALIST
Payer: COMMERCIAL

## 2023-02-06 DIAGNOSIS — M81.8 OTHER OSTEOPOROSIS WITHOUT CURRENT PATHOLOGICAL FRACTURE: ICD-10-CM

## 2023-02-06 PROCEDURE — 77080 DXA BONE DENSITY AXIAL: CPT | Mod: 26,,, | Performed by: INTERNAL MEDICINE

## 2023-02-06 PROCEDURE — 77080 DXA BONE DENSITY AXIAL: CPT | Mod: TC,PO

## 2023-02-06 PROCEDURE — 77080 DEXA BONE DENSITY SPINE HIP: ICD-10-PCS | Mod: 26,,, | Performed by: INTERNAL MEDICINE

## 2023-02-07 ENCOUNTER — HOSPITAL ENCOUNTER (OUTPATIENT)
Dept: RADIOLOGY | Facility: HOSPITAL | Age: 58
Discharge: HOME | End: 2023-02-07
Attending: FAMILY MEDICINE
Payer: COMMERCIAL

## 2023-02-07 ENCOUNTER — OFFICE VISIT (OUTPATIENT)
Dept: PRIMARY CARE CLINIC | Facility: CLINIC | Age: 58
End: 2023-02-07
Payer: COMMERCIAL

## 2023-02-07 VITALS
OXYGEN SATURATION: 98 % | BODY MASS INDEX: 25.82 KG/M2 | SYSTOLIC BLOOD PRESSURE: 118 MMHG | TEMPERATURE: 98 F | HEART RATE: 65 BPM | WEIGHT: 151.25 LBS | HEIGHT: 64 IN | DIASTOLIC BLOOD PRESSURE: 82 MMHG

## 2023-02-07 DIAGNOSIS — M25.551 BILATERAL HIP PAIN: ICD-10-CM

## 2023-02-07 DIAGNOSIS — M81.8 OTHER OSTEOPOROSIS WITHOUT CURRENT PATHOLOGICAL FRACTURE: ICD-10-CM

## 2023-02-07 DIAGNOSIS — M53.3 PAIN IN SACRUM: ICD-10-CM

## 2023-02-07 DIAGNOSIS — M54.50 LOW BACK PAIN AT MULTIPLE SITES: ICD-10-CM

## 2023-02-07 DIAGNOSIS — M25.552 BILATERAL HIP PAIN: ICD-10-CM

## 2023-02-07 DIAGNOSIS — M54.50 LOW BACK PAIN AT MULTIPLE SITES: Primary | ICD-10-CM

## 2023-02-07 DIAGNOSIS — K59.09 CHRONIC CONSTIPATION: ICD-10-CM

## 2023-02-07 DIAGNOSIS — M16.12 PRIMARY OSTEOARTHRITIS OF LEFT HIP: ICD-10-CM

## 2023-02-07 PROBLEM — E21.3 HYPERPARATHYROIDISM: Status: ACTIVE | Noted: 2023-02-07

## 2023-02-07 PROBLEM — E21.3 HYPERPARATHYROIDISM: Status: RESOLVED | Noted: 2023-02-07 | Resolved: 2023-02-07

## 2023-02-07 PROCEDURE — 99214 OFFICE O/P EST MOD 30 MIN: CPT | Mod: S$GLB,,, | Performed by: FAMILY MEDICINE

## 2023-02-07 PROCEDURE — 99999 PR PBB SHADOW E&M-EST. PATIENT-LVL IV: CPT | Mod: PBBFAC,,, | Performed by: FAMILY MEDICINE

## 2023-02-07 PROCEDURE — 1159F PR MEDICATION LIST DOCUMENTED IN MEDICAL RECORD: ICD-10-PCS | Mod: CPTII,S$GLB,, | Performed by: FAMILY MEDICINE

## 2023-02-07 PROCEDURE — 3008F PR BODY MASS INDEX (BMI) DOCUMENTED: ICD-10-PCS | Mod: CPTII,S$GLB,, | Performed by: FAMILY MEDICINE

## 2023-02-07 PROCEDURE — 1160F PR REVIEW ALL MEDS BY PRESCRIBER/CLIN PHARMACIST DOCUMENTED: ICD-10-PCS | Mod: CPTII,S$GLB,, | Performed by: FAMILY MEDICINE

## 2023-02-07 PROCEDURE — 72220 XR SACRUM AND COCCYX: ICD-10-PCS | Mod: 26,,, | Performed by: RADIOLOGY

## 2023-02-07 PROCEDURE — 99214 PR OFFICE/OUTPT VISIT, EST, LEVL IV, 30-39 MIN: ICD-10-PCS | Mod: S$GLB,,, | Performed by: FAMILY MEDICINE

## 2023-02-07 PROCEDURE — 72100 X-RAY EXAM L-S SPINE 2/3 VWS: CPT | Mod: 26,,, | Performed by: RADIOLOGY

## 2023-02-07 PROCEDURE — 72100 XR LUMBAR SPINE AP AND LATERAL: ICD-10-PCS | Mod: 26,,, | Performed by: RADIOLOGY

## 2023-02-07 PROCEDURE — 72100 X-RAY EXAM L-S SPINE 2/3 VWS: CPT | Mod: TC,PN

## 2023-02-07 PROCEDURE — 99999 PR PBB SHADOW E&M-EST. PATIENT-LVL IV: ICD-10-PCS | Mod: PBBFAC,,, | Performed by: FAMILY MEDICINE

## 2023-02-07 PROCEDURE — 3079F PR MOST RECENT DIASTOLIC BLOOD PRESSURE 80-89 MM HG: ICD-10-PCS | Mod: CPTII,S$GLB,, | Performed by: FAMILY MEDICINE

## 2023-02-07 PROCEDURE — 3008F BODY MASS INDEX DOCD: CPT | Mod: CPTII,S$GLB,, | Performed by: FAMILY MEDICINE

## 2023-02-07 PROCEDURE — 3074F SYST BP LT 130 MM HG: CPT | Mod: CPTII,S$GLB,, | Performed by: FAMILY MEDICINE

## 2023-02-07 PROCEDURE — 72220 X-RAY EXAM SACRUM TAILBONE: CPT | Mod: 26,,, | Performed by: RADIOLOGY

## 2023-02-07 PROCEDURE — 1160F RVW MEDS BY RX/DR IN RCRD: CPT | Mod: CPTII,S$GLB,, | Performed by: FAMILY MEDICINE

## 2023-02-07 PROCEDURE — 72220 X-RAY EXAM SACRUM TAILBONE: CPT | Mod: TC,PN

## 2023-02-07 PROCEDURE — 3074F PR MOST RECENT SYSTOLIC BLOOD PRESSURE < 130 MM HG: ICD-10-PCS | Mod: CPTII,S$GLB,, | Performed by: FAMILY MEDICINE

## 2023-02-07 PROCEDURE — 3079F DIAST BP 80-89 MM HG: CPT | Mod: CPTII,S$GLB,, | Performed by: FAMILY MEDICINE

## 2023-02-07 PROCEDURE — 1159F MED LIST DOCD IN RCRD: CPT | Mod: CPTII,S$GLB,, | Performed by: FAMILY MEDICINE

## 2023-02-07 NOTE — PROGRESS NOTES
Subjective:      Patient ID: Grace Beltran is a 57 y.o. female.    Chief Complaint: Follow-up, Constipation, and Back Pain    56 yo w cervicalgia, hx hip surgery, bilateral hip arthritis,  osteoporosis (off reclast) anixety  Here today for follow constipation back pain - pain in sacrum when I sleep. I wake up 4 hrs after falling asleep. I can't sleep on my hips due to arthritis, so I've had to sleep on back    Pain in R lower back, miralax, benefitber, dulcolax, last Colonoscopy 2019 , but assisted w balloon device. No blood.  Worse pain since  Christmas December 2022- I thougt it was my mom's mattress. No lift, moving, pulling, fall trauma. I'm still having this pain. No fever, chills, sweats, no rash. Does not take meds    She follows with endocrinologist Dr. Zamorano for Reclast, treatment osteoporosis    Last year had physical therapy with dry needling for cervicalgia. It did help & realized that working at work computer  - high screen, ahd to look up & hard to look through the bottom of her  glasses. At home while working now, I use a ball chair, laptop & can look through bottom of glasses . Neck pain is gone    Taking Celexa 40    Denies any chest pain, shortness of breath, nausea vomiting constipation diarrhea, blood in stool, heartburn    Current Outpatient Medications   Medication Instructions    citalopram (CELEXA) 40 MG tablet TAKE 1 TABLET BY MOUTH EVERY DAY       No results found for: HGBA1C  No results found for: MICALBCREAT  Lab Results   Component Value Date    LDLCALC 131.4 02/15/2022    LDLCALC 124.0 12/10/2020    CHOL 227 (H) 02/15/2022    HDL 74 02/15/2022    TRIG 108 02/15/2022       Lab Results   Component Value Date     01/20/2023    K 4.5 01/20/2023     01/20/2023    CO2 31 (H) 01/20/2023    GLU 90 01/20/2023    BUN 10 01/20/2023    CREATININE 0.8 01/20/2023    CALCIUM 9.7 01/20/2023    PROT 6.9 01/20/2023    ALBUMIN 3.8 01/20/2023    BILITOT 0.4 01/20/2023    ALKPHOS 82  01/20/2023    AST 20 01/20/2023    ALT 21 01/20/2023    ANIONGAP 6 (L) 01/20/2023    ESTGFRAFRICA >60.0 02/15/2022    EGFRNONAA >60.0 02/15/2022    WBC 3.76 (L) 02/15/2022    HGB 14.2 02/15/2022    HGB 13.7 12/10/2020    HCT 43.2 02/15/2022    MCV 90 02/15/2022     02/15/2022    TSH 0.944 03/03/2022    HEPCAB Negative 05/18/2017       Lab Results   Component Value Date    FSH 6.2 01/12/2004    PROGESTERONE 16.3 (H) 02/25/2004    ESTRADIOL 110 01/12/2004    DNVMLRRR64GB 23 (L) 01/20/2023    BOVNSKVT19HT 41 03/03/2022         Past Medical History:   Diagnosis Date    Acne     Dermatologist prescribes spironolactone     Anxiety     celexa 40    Brachioradial pruritus 8/11/2022    Chronic constipation 5/24/2017    History of patellar fracture 7/17/2019    L, slipped at home    Hot flashes due to menopause 5/24/2017    Osteoarthritis of spine with radiculopathy, cervical region 8/11/2022    CT 2014, pruritis    Other osteoporosis without current pathological fracture 05/24/2017    Reclast with Rheum, ankle & patella fracture, mom with dx    Primary osteoarthritis of left hip 05/24/2017    Dr Garay, dry needling at [facility] helping, MRI 2016, Injection didn't help much    Right elbow pain 07/20/2017    dry needling at [facility] helping    Vitamin D deficiency 8/19/2019    Dr Blas started 50,000 q 7d in August 2019     Past Surgical History:   Procedure Laterality Date    COLONOSCOPY N/A 4/8/2019    Procedure: COLONOSCOPY;  Surgeon: Rakesh Parmar MD;  Location: [facility] (4TH FLR);  Service: Endoscopy;  Laterality: N/A; significantly excessive looping of the colon    COLONOSCOPY N/A 6/13/2019    Procedure: COLONOSCOPY - Device-assisted;  Surgeon: Sravan Haji MD;  Location: [facility] (2ND FLR);  Service: Endoscopy;  Laterality: N/A;  Single-balloon scope; unremarkable, repeat in 10 years for screening    right hip surgery      April 24, 2012     Social History     Social History Narrative    Not on file  "    Family History   Problem Relation Age of Onset    Hypertension Mother     Hyperlipidemia Mother     Osteoporosis Mother     Breast cancer Maternal Aunt 65    Colon cancer Neg Hx     Colon polyps Neg Hx     Crohn's disease Neg Hx     Ulcerative colitis Neg Hx     Stomach cancer Neg Hx     Esophageal cancer Neg Hx      Vitals:    02/07/23 0825   BP: 118/82   Pulse: 65   Temp: 98.4 °F (36.9 °C)   TempSrc: Oral   SpO2: 98%   Weight: 68.6 kg (151 lb 3.8 oz)   Height: 5' 4" (1.626 m)   PainSc:   3   PainLoc: Back     Objective:   Physical Exam  Constitutional:       Appearance: She is well-developed.   HENT:      Head: Normocephalic and atraumatic.      Nose: Nose normal.      Mouth/Throat:      Mouth: Mucous membranes are moist.      Pharynx: Oropharynx is clear.   Eyes:      Pupils: Pupils are equal, round, and reactive to light.   Neck:      Thyroid: No thyromegaly.   Cardiovascular:      Rate and Rhythm: Normal rate and regular rhythm.      Heart sounds: Normal heart sounds. No murmur heard.  Pulmonary:      Effort: Pulmonary effort is normal.      Breath sounds: Normal breath sounds. No wheezing.   Abdominal:      General: Bowel sounds are normal. There is no distension.      Palpations: Abdomen is soft. There is no mass.      Tenderness: There is no abdominal tenderness. There is no guarding or rebound.   Musculoskeletal:      Cervical back: Neck supple.      Comments: Normal gait, can walk on toes and heels, uneven folds on back, leg length discrepancy, can lean forward and touch toes, and lean back and side to side without discomfort,  nontender lumbar spine, nontender paraspinous musculature, nontender sacroiliacs, negative straight leg test, 2+ pulses a   Lymphadenopathy:      Cervical: No cervical adenopathy.   Skin:     General: Skin is warm and dry.   Neurological:      Mental Status: She is alert and oriented to person, place, and time.   Psychiatric:         Behavior: Behavior normal.     Assessment: "     1. Low back pain at multiple sites    2. Pain in sacrum    3. Chronic constipation    4. Other osteoporosis without current pathological fracture    5. Primary osteoarthritis of left hip    6. Bilateral hip pain      Plan:     Orders Placed This Encounter    X-Ray Sacrum And Coccyx    X-Ray Lumbar Spine AP And Lateral     Consider CT abd pelvis  No new labs at this time  Follow with GYN for female health & cancer prevention  =========================================================follow w specialists    Your #1 MEDICINE is 10 minutes of WALKING EVERY DAY to wake up your metabolism to burn off the food you eat  Respect the #1 cause of death- cardiovascular disease (HEART ATTACK & STROKE). Keep normal blood pressure, cholesterol, sugars, & quit smoking.     VACCINES: FLU yearly, PNEUMONIA at 65,  SHINGLES at 50  COLON CANCER screening colonoscopy starting at 46 yo  Eat more food grown from the earth (picked from trees or out of the ground)    Follow up yearly with LABS ONE WEEK PRIOR so we can discuss at your visit    There are no Patient Instructions on file for this visit.

## 2023-02-08 ENCOUNTER — PATIENT MESSAGE (OUTPATIENT)
Dept: PRIMARY CARE CLINIC | Facility: CLINIC | Age: 58
End: 2023-02-08
Payer: COMMERCIAL

## 2023-02-08 DIAGNOSIS — R10.30 LOWER ABDOMINAL PAIN: Primary | ICD-10-CM

## 2023-02-08 PROBLEM — M41.9 LUMBAR SCOLIOSIS: Status: ACTIVE | Noted: 2023-02-08

## 2023-03-14 ENCOUNTER — OFFICE VISIT (OUTPATIENT)
Dept: ENDOCRINOLOGY | Facility: CLINIC | Age: 58
End: 2023-03-14
Payer: COMMERCIAL

## 2023-03-14 VITALS
SYSTOLIC BLOOD PRESSURE: 123 MMHG | BODY MASS INDEX: 26.43 KG/M2 | HEART RATE: 66 BPM | DIASTOLIC BLOOD PRESSURE: 87 MMHG | WEIGHT: 154 LBS | TEMPERATURE: 98 F

## 2023-03-14 DIAGNOSIS — M81.8 OTHER OSTEOPOROSIS WITHOUT CURRENT PATHOLOGICAL FRACTURE: ICD-10-CM

## 2023-03-14 DIAGNOSIS — M25.552 LEFT HIP PAIN: ICD-10-CM

## 2023-03-14 DIAGNOSIS — E55.9 VITAMIN D DEFICIENCY: ICD-10-CM

## 2023-03-14 PROCEDURE — 1160F PR REVIEW ALL MEDS BY PRESCRIBER/CLIN PHARMACIST DOCUMENTED: ICD-10-PCS | Mod: CPTII,S$GLB,, | Performed by: HOSPITALIST

## 2023-03-14 PROCEDURE — 99214 PR OFFICE/OUTPT VISIT, EST, LEVL IV, 30-39 MIN: ICD-10-PCS | Mod: S$GLB,,, | Performed by: HOSPITALIST

## 2023-03-14 PROCEDURE — 3079F PR MOST RECENT DIASTOLIC BLOOD PRESSURE 80-89 MM HG: ICD-10-PCS | Mod: CPTII,S$GLB,, | Performed by: HOSPITALIST

## 2023-03-14 PROCEDURE — 3079F DIAST BP 80-89 MM HG: CPT | Mod: CPTII,S$GLB,, | Performed by: HOSPITALIST

## 2023-03-14 PROCEDURE — 3008F BODY MASS INDEX DOCD: CPT | Mod: CPTII,S$GLB,, | Performed by: HOSPITALIST

## 2023-03-14 PROCEDURE — 3008F PR BODY MASS INDEX (BMI) DOCUMENTED: ICD-10-PCS | Mod: CPTII,S$GLB,, | Performed by: HOSPITALIST

## 2023-03-14 PROCEDURE — 99999 PR PBB SHADOW E&M-EST. PATIENT-LVL III: ICD-10-PCS | Mod: PBBFAC,,, | Performed by: HOSPITALIST

## 2023-03-14 PROCEDURE — 1160F RVW MEDS BY RX/DR IN RCRD: CPT | Mod: CPTII,S$GLB,, | Performed by: HOSPITALIST

## 2023-03-14 PROCEDURE — 1159F PR MEDICATION LIST DOCUMENTED IN MEDICAL RECORD: ICD-10-PCS | Mod: CPTII,S$GLB,, | Performed by: HOSPITALIST

## 2023-03-14 PROCEDURE — 3074F PR MOST RECENT SYSTOLIC BLOOD PRESSURE < 130 MM HG: ICD-10-PCS | Mod: CPTII,S$GLB,, | Performed by: HOSPITALIST

## 2023-03-14 PROCEDURE — 1159F MED LIST DOCD IN RCRD: CPT | Mod: CPTII,S$GLB,, | Performed by: HOSPITALIST

## 2023-03-14 PROCEDURE — 99999 PR PBB SHADOW E&M-EST. PATIENT-LVL III: CPT | Mod: PBBFAC,,, | Performed by: HOSPITALIST

## 2023-03-14 PROCEDURE — 3074F SYST BP LT 130 MM HG: CPT | Mod: CPTII,S$GLB,, | Performed by: HOSPITALIST

## 2023-03-14 PROCEDURE — 99214 OFFICE O/P EST MOD 30 MIN: CPT | Mod: S$GLB,,, | Performed by: HOSPITALIST

## 2023-03-14 NOTE — ASSESSMENT & PLAN NOTE
- Patient with Osteoporosis diagnosed 2019, with improvement in bone mineral density on 2023  - most recent DXA scan personal reviewed by me and discussed with patient in clinic.   - Risk factors include:  Postmenopausal  - Patient does report needing hip replacement in the future  - discussed bone density with patient, given improvement in bone mineral density and with patient not receiving IV Reclast since 2019  - she does not want to restart medical therapy with IV Reclast nor oral medication  - given low risk of falls or fracture, agree with osteoporosis drug holiday  - recommend continuing vitamin-D replacement, advised patient to start calcium supplement 1200 mg daily, written instruction given  - Next DXA:  2 years from  most current  - Fall precautions/Exercise regimen: advised  - Routine dental health screening advised  - follow-up with me yearly  - next bone density 2025

## 2023-03-14 NOTE — PROGRESS NOTES
Subjective:      Patient ID: Grace Beltran is a 57 y.o. female presented to [facility] on 3/14/2023.  Chief Complaint:  Osteoporosis    History of Present Illness: Grace Beltran is a 57 y.o. female here for osteoporosis  No other significant past medical history.     Interval history:  Here for follow-up osteoporosis.  Lost follow-up, last seen by me 11/2021  Denies any falls, fractures   On vitamin-D calcium supplement   Denies any dental problems      In regards to Osteoporosis  - Patient complains of osteoporosis.   - She was diagnosed with osteoporosis by bone density scan in 7/2019.   - current medication: IV Reclast> started on 10/23/2019 1x, subsequently due to COVID in missed appointment, did not continue IV Reclast yearly  - did get flu like symptoms for 24 hours after reclast  - Currently taking Tums dailys, not taking supplement like Vit D at this time.   - Patient admits to history of fracture of R patella after a fall in 10/2018, R ankle fracture after fall in 2016. R hip surgery to fix labral tear in 2012  - Family hx of Osteoporosis, mother got diagnosed at 52, now 77.  Grandmother also had osteoporosis.    - Menopause, ~50 yo of age. Not on estrogen therapy  - Follow up with dentist, no jaw/dental issue  - No acid reflux  - No renal stone  - No hematologic cancer: no Multiple myeloma hx    Osteoporosis Risk Factors   Nonmodifiable  - Personal Hx of fracture as an adult: yes    - Hx of fracture in first-degree relative: yes - mom, shoulder fracture, at 76  -  race: yes  - Advanced age: no  - Female sex: yes  - Dementia: no  - Poor health/frailty: no     Potentially modifiable:  Tobacco use: no  Low body weight (<127 lbs): no  Estrogen deficiency: Yes, Menopause, ~50 yo of age  Low calcium intake (lifelong): no, heavy milk drinker, still with dairy product  Alcoholism: no, one beer every 3 days  Recurrent falls: no  Inadequate physical activity: no     Current  calcium and Vit D intake:  Dietary sources: Drinks milk and daily product  Supplements: Tums     DXA   02/09/2023  Lumbar spine (L1-L4): BMD is 0.795 g/cm2, T-score is -2.3, and Z-score is -1.0.  Total hip: BMD is 0.794 g/cm2, T-score is -1.2, and Z-score is -0.4.  Femoral neck: BMD is 0.745 g/cm2, T-score is -0.9, and Z-score is 0.3.     FRAX:  11% risk of a major osteoporotic fracture in the next 10 years.  0.6% risk of hip fracture in the next 10 years.     Impression:  *Osteoporosis previously treated with Reclast    7/16/19  Lumbar Spine: Lumbar bone mineral density L1-L4 is 0.742g/cm2, which is a T-score of -2.8. The Z-score is -1.7.  Total Hip: Total hip bone mineral density is 0.791g/cm2.  The T-score is -1.2, and the Z-score is -0.6.  Femoral neck: Bone mineral density is 0.733g/cm2 and the T-score is -1.0 and the Z-score is 0.0 g/cm2.  There is a 10 % risk of a major osteoporotic fracture and a 0.6% risk of hip fracture in the next 10 years (FRAX).    Lab work reviewed  Lab Results   Component Value Date    PTH 86.1 (H) 01/20/2023    PTH 68.0 03/03/2022    PTH 74.0 08/19/2019    MPRDFTQJ06ZO 23 (L) 01/20/2023    TQPCFZTU61HJ 41 03/03/2022    DTYSUBUU62UJ 16 (L) 08/19/2019    CALCIUM 9.7 01/20/2023    CALCIUM 9.3 02/15/2022    CALCIUM 9.0 12/10/2020    PHOS 3.6 01/20/2023    PHOS 3.0 03/03/2022    PHOS 4.7 (H) 08/19/2019    ALKPHOS 82 01/20/2023    ALKPHOS 90 02/15/2022    ALKPHOS 87 12/10/2020    TSH 0.944 03/03/2022    LABCALC 16.1 (H) 08/26/2019    QTOSUAJ47PXH 11 08/26/2019      Reviewed past surgical, medical, family, social history and updated as appropriate.  Review of Systems: see HPI above    Objective:   /87 (BP Location: Right arm)   Pulse 66   Temp 98.1 °F (36.7 °C) (Oral)   Wt 69.9 kg (154 lb)   LMP 07/24/2016 (Approximate)   BMI 26.43 kg/m²     Body mass index is 26.43 kg/m².  Vital signs reviewed    Physical Exam  Vitals and nursing note reviewed.   Constitutional:        Appearance: Normal appearance. She is well-developed. She is not ill-appearing.   Neck:      Thyroid: No thyromegaly.   Pulmonary:      Effort: Pulmonary effort is normal. No respiratory distress.   Musculoskeletal:         General: Normal range of motion.      Cervical back: Normal range of motion.   Neurological:      General: No focal deficit present.      Mental Status: She is alert. Mental status is at baseline.   Psychiatric:         Mood and Affect: Mood normal.         Behavior: Behavior normal.     Lab Reviewed:   No results found for: HGBA1C    Lab Results   Component Value Date    CHOL 227 (H) 02/15/2022    HDL 74 02/15/2022    LDLCALC 131.4 02/15/2022    TRIG 108 02/15/2022    CHOLHDL 32.6 02/15/2022     Lab Results   Component Value Date     01/20/2023    K 4.5 01/20/2023     01/20/2023    CO2 31 (H) 01/20/2023    GLU 90 01/20/2023    BUN 10 01/20/2023    CREATININE 0.8 01/20/2023    CALCIUM 9.7 01/20/2023    PROT 6.9 01/20/2023    ALBUMIN 3.8 01/20/2023    BILITOT 0.4 01/20/2023    ALKPHOS 82 01/20/2023    AST 20 01/20/2023    ALT 21 01/20/2023    ANIONGAP 6 (L) 01/20/2023    ESTGFRAFRICA >60.0 02/15/2022    EGFRNONAA >60.0 02/15/2022    TSH 0.944 03/03/2022     Assessment     1. Other osteoporosis without current pathological fracture        2. Vitamin D deficiency        3. Left hip pain          Plan     Other osteoporosis without current pathological fracture  - Patient with Osteoporosis diagnosed 2019, with improvement in bone mineral density on 2023  - most recent DXA scan personal reviewed by me and discussed with patient in clinic.   - Risk factors include:  Postmenopausal  - Patient does report needing hip replacement in the future  - discussed bone density with patient, given improvement in bone mineral density and with patient not receiving IV Reclast since 2019  - she does not want to restart medical therapy with IV Reclast nor oral medication  - given low risk of falls or  fracture, agree with osteoporosis drug holiday  - recommend continuing vitamin-D replacement, advised patient to start calcium supplement 1200 mg daily, written instruction given  - Next DXA:  2 years from  most current  - Fall precautions/Exercise regimen: advised  - Routine dental health screening advised  - follow-up with me yearly  - next bone density 2025    Vitamin D deficiency  - Advised patient take 2000 IU daily OTC consistent  - repeat level 6 months with PCP    Left hip pain  - patient reports the need for hip replacement in the future   - advised patient to discuss the need for osteoporosis treatment to help with bone growth with orthopedic surgery    RTC in yearly    Thuan Blas M.D.  Endocrinology  [facility]  3/14/2023      Disclaimer: This note has been generated in part with the use of voice-recognition software. There may be typographical errors that have been missed during proof-reading.

## 2023-03-14 NOTE — ASSESSMENT & PLAN NOTE
- patient reports the need for hip replacement in the future   - advised patient to discuss the need for osteoporosis treatment to help with bone growth with orthopedic surgery

## 2023-03-15 DIAGNOSIS — Z12.31 OTHER SCREENING MAMMOGRAM: ICD-10-CM

## 2023-03-24 ENCOUNTER — HOSPITAL ENCOUNTER (OUTPATIENT)
Dept: RADIOLOGY | Facility: HOSPITAL | Age: 58
Discharge: HOME | End: 2023-03-24
Attending: FAMILY MEDICINE
Payer: COMMERCIAL

## 2023-03-24 VITALS — BODY MASS INDEX: 26.29 KG/M2 | WEIGHT: 154 LBS | HEIGHT: 64 IN

## 2023-03-24 DIAGNOSIS — Z12.31 OTHER SCREENING MAMMOGRAM: ICD-10-CM

## 2023-03-24 PROCEDURE — 77063 BREAST TOMOSYNTHESIS BI: CPT | Mod: 26,,, | Performed by: RADIOLOGY

## 2023-03-24 PROCEDURE — 77067 SCR MAMMO BI INCL CAD: CPT | Mod: TC

## 2023-03-24 PROCEDURE — 77067 SCR MAMMO BI INCL CAD: CPT | Mod: 26,,, | Performed by: RADIOLOGY

## 2023-03-24 PROCEDURE — 77067 MAMMO DIGITAL SCREENING BILAT WITH TOMO: ICD-10-PCS | Mod: 26,,, | Performed by: RADIOLOGY

## 2023-03-24 PROCEDURE — 77063 MAMMO DIGITAL SCREENING BILAT WITH TOMO: ICD-10-PCS | Mod: 26,,, | Performed by: RADIOLOGY

## 2023-06-01 ENCOUNTER — TELEPHONE (OUTPATIENT)
Dept: ORTHOPEDICS | Facility: CLINIC | Age: 58
End: 2023-06-01
Payer: COMMERCIAL

## 2023-06-01 NOTE — TELEPHONE ENCOUNTER
Spoke with pt. Pt states that she had the tailbone pain since Dec 2022. I explain to pt that PEPITO Case PA-C do not see tailbone pain and that she needs to see Back and Spine provider. Appointment with RR is cancel 6/2/23 and reschedule with EMILY Velez PA-C 6/5/23. Patient states verbal understanding and has no further questions.

## 2023-06-08 NOTE — PROGRESS NOTES
DATE: 6/12/2023  PATIENT: Grace Beltran    Supervising Physician: Ruben Schwartz M.D.    CHIEF COMPLAINT: tailbone pain    HISTORY:  Grace Beltran is a 57 y.o. female with pmhx of scoliosis here for initial evaluation of coccyx pain (coccyx - 5).   The pain has been present for about 6 months. Denies psecific injury but states the pain began after sleeping on a hard mattress in December. The patient describes the pain as dull.  The pain is worse with laying down and improved by walking. There is no associated numbness and tingling. There is no subjective weakness. Prior treatments have included tylenol, but no PT, SAYDA or surgery.    The patient denies myelopathic symptoms such as handwriting changes or difficulty with buttons/coins/keys. Denies perineal paresthesias, bowel/bladder dysfunction.    PAST MEDICAL/SURGICAL HISTORY:  Past Medical History:   Diagnosis Date    Acne     Dermatologist prescribes spironolactone     Anxiety     celexa 40    Brachioradial pruritus 08/11/2022    Chronic constipation 05/24/2017    History of patellar fracture 07/17/2019    L, slipped at home    Hot flashes due to menopause 05/24/2017    Lumbar scoliosis     Osteoarthritis of spine with radiculopathy, cervical region 08/11/2022    CT 2014, pruritis    Other osteoporosis without current pathological fracture 05/24/2017    Reclast with Rheum, ankle & patella fracture, mom with dx    Primary osteoarthritis of left hip 05/24/2017    Dr Garay, dry needling at [facility] helping, MRI 2016, Injection didn't help much    Right elbow pain 07/20/2017    dry needling at [facility] helping    Vitamin D deficiency 08/19/2019    Dr Blas started 50,000 q 7d in August 2019     Past Surgical History:   Procedure Laterality Date    COLONOSCOPY N/A 4/8/2019    Procedure: COLONOSCOPY;  Surgeon: Rakesh Parmar MD;  Location: [facility] ([address] [place] [address]);  Service: Endoscopy;  Laterality: N/A; significantly excessive looping of the colon     "COLONOSCOPY N/A 6/13/2019    Procedure: COLONOSCOPY - Device-assisted;  Surgeon: Sravan Haji MD;  Location: [facility] ([address] [place] [address]);  Service: Endoscopy;  Laterality: N/A;  Single-balloon scope; unremarkable, repeat in 10 years for screening    right hip surgery      April 24, 2012       Medications:   Current Outpatient Medications on File Prior to Visit   Medication Sig Dispense Refill    citalopram (CELEXA) 40 MG tablet TAKE 1 TABLET BY MOUTH EVERY DAY 90 tablet 3     No current facility-administered medications on file prior to visit.       Social History:   Social History     Socioeconomic History    Marital status: Single   Tobacco Use    Smoking status: Never    Smokeless tobacco: Never   Substance and Sexual Activity    Alcohol use: Yes     Alcohol/week: 8.0 standard drinks     Types: 4 Standard drinks or equivalent, 4 Cans of beer per week    Drug use: No    Sexual activity: Yes     Partners: Female       REVIEW OF SYSTEMS:  Constitution: Negative. Negative for chills, fever and night sweats.   Cardiovascular: Negative for chest pain and syncope.   Respiratory: Negative for cough and shortness of breath.   Gastrointestinal: See HPI. Negative for nausea/vomiting. Negative for abdominal pain.  Genitourinary: See HPI. Negative for discoloration or dysuria.  Skin: Negative for dry skin, itching and rash.   Hematologic/Lymphatic: Negative for bleeding problem. Does not bruise/bleed easily.   Musculoskeletal: Negative for falls and muscle weakness.   Neurological: See HPI. No seizures.   Endocrine: Negative for polydipsia, polyphagia and polyuria.   Allergic/Immunologic: Negative for hives and persistent infections.     EXAM:  Ht 5' 4" (1.626 m)   Wt 69.8 kg (153 lb 14.1 oz)   LMP 07/24/2016 (Approximate)   BMI 26.41 kg/m²     General: The patient is a very pleasant 57 y.o. female in no apparent distress, the patient is oriented to person, place and time.  Psych: Normal mood and affect  HEENT: Vision grossly " intact, hearing intact to the spoken word.  Lungs: Respirations unlabored.  Gait: Normal station and gait, no difficulty with toe or heel walk.   Skin: Dorsal lumbar skin negative for rashes, lesions, hairy patches and surgical scars. There is mild lumbar tenderness to palpation.  Range of motion: Lumbar range of motion is acceptable.  Spinal Balance: Global saggital and coronal spinal balance acceptable, not significant for scoliosis and kyphosis.  Musculoskeletal: No pain with the range of motion of the bilateral hips. No trochanteric tenderness to palpation.  Vascular: Bilateral lower extremities warm and well perfused, dorsalis pedis pulses 2+ bilaterally.  Neurological: Normal strength and tone in all major motor groups in the bilateral lower extremities. Normal sensation to light touch in the L2-S1 dermatomes bilaterally.  Deep tendon reflexes symmetric 2+ in the bilateral lower extremities.  Negative Babinski bilaterally. Straight leg raise negative bilaterally.    IMAGING:      Today I personally reviewed AP, Lat and Flex/Ex  upright L-spine films that demonstrate no significant degenerative changes.  Sacral xray is normal.       Body mass index is 26.41 kg/m².    No results found for: HGBA1C        ASSESSMENT/PLAN:    Grace was seen today for low-back pain.    Diagnoses and all orders for this visit:    Coccyx pain  -     Procedure Order to Pain Management; Future  -     methocarbamoL (ROBAXIN) 500 MG Tab; Take 1-2 tablets (500-1,000 mg total) by mouth 3 (three) times daily as needed (muscle spasms).  -     celecoxib (CELEBREX) 100 MG capsule; Take 1 capsule (100 mg total) by mouth nightly as needed for Pain.      Today we discussed at length all of the different treatment options including anti-inflammatories, acetaminophen, rest, ice, heat, physical therapy including strengthening and stretching exercises, home exercises, ROM, aerobic conditioning, aqua therapy, other modalities including ultrasound,  massage, and dry needling, epidural steroid injections and finally surgical intervention.  Ganglion impar block ordered. She may follow up as needed.

## 2023-06-12 ENCOUNTER — OFFICE VISIT (OUTPATIENT)
Dept: ORTHOPEDICS | Facility: CLINIC | Age: 58
End: 2023-06-12
Payer: COMMERCIAL

## 2023-06-12 VITALS — WEIGHT: 153.88 LBS | BODY MASS INDEX: 26.27 KG/M2 | HEIGHT: 64 IN

## 2023-06-12 DIAGNOSIS — M53.3 COCCYX PAIN: Primary | ICD-10-CM

## 2023-06-12 PROCEDURE — 99999 PR PBB SHADOW E&M-EST. PATIENT-LVL III: ICD-10-PCS | Mod: PBBFAC,,, | Performed by: REGISTERED NURSE

## 2023-06-12 PROCEDURE — 1160F PR REVIEW ALL MEDS BY PRESCRIBER/CLIN PHARMACIST DOCUMENTED: ICD-10-PCS | Mod: CPTII,S$GLB,, | Performed by: REGISTERED NURSE

## 2023-06-12 PROCEDURE — 1160F RVW MEDS BY RX/DR IN RCRD: CPT | Mod: CPTII,S$GLB,, | Performed by: REGISTERED NURSE

## 2023-06-12 PROCEDURE — 99999 PR PBB SHADOW E&M-EST. PATIENT-LVL III: CPT | Mod: PBBFAC,,, | Performed by: REGISTERED NURSE

## 2023-06-12 PROCEDURE — 1159F MED LIST DOCD IN RCRD: CPT | Mod: CPTII,S$GLB,, | Performed by: REGISTERED NURSE

## 2023-06-12 PROCEDURE — 3008F BODY MASS INDEX DOCD: CPT | Mod: CPTII,S$GLB,, | Performed by: REGISTERED NURSE

## 2023-06-12 PROCEDURE — 99204 PR OFFICE/OUTPT VISIT, NEW, LEVL IV, 45-59 MIN: ICD-10-PCS | Mod: S$GLB,,, | Performed by: REGISTERED NURSE

## 2023-06-12 PROCEDURE — 3008F PR BODY MASS INDEX (BMI) DOCUMENTED: ICD-10-PCS | Mod: CPTII,S$GLB,, | Performed by: REGISTERED NURSE

## 2023-06-12 PROCEDURE — 99204 OFFICE O/P NEW MOD 45 MIN: CPT | Mod: S$GLB,,, | Performed by: REGISTERED NURSE

## 2023-06-12 PROCEDURE — 1159F PR MEDICATION LIST DOCUMENTED IN MEDICAL RECORD: ICD-10-PCS | Mod: CPTII,S$GLB,, | Performed by: REGISTERED NURSE

## 2023-06-12 RX ORDER — CELECOXIB 100 MG/1
100 CAPSULE ORAL NIGHTLY PRN
Qty: 60 CAPSULE | Refills: 2 | Status: SHIPPED | OUTPATIENT
Start: 2023-06-12

## 2023-06-12 RX ORDER — METHOCARBAMOL 500 MG/1
500-1000 TABLET, FILM COATED ORAL 3 TIMES DAILY PRN
Qty: 60 TABLET | Refills: 2 | Status: SHIPPED | OUTPATIENT
Start: 2023-06-12 | End: 2023-07-12

## 2023-06-13 ENCOUNTER — PATIENT MESSAGE (OUTPATIENT)
Dept: PAIN MEDICINE | Facility: OTHER | Age: 58
End: 2023-06-13
Payer: COMMERCIAL

## 2023-06-13 DIAGNOSIS — M53.3 COCCYX PAIN: Primary | ICD-10-CM

## 2023-07-19 ENCOUNTER — TELEPHONE (OUTPATIENT)
Dept: PAIN MEDICINE | Facility: CLINIC | Age: 58
End: 2023-07-19
Payer: COMMERCIAL

## 2023-07-19 ENCOUNTER — TELEPHONE (OUTPATIENT)
Dept: ADMINISTRATIVE | Facility: OTHER | Age: 58
End: 2023-07-19
Payer: COMMERCIAL

## 2023-07-19 NOTE — TELEPHONE ENCOUNTER
----- Message from Anthony Grey sent at 7/19/2023  9:13 AM CDT -----   Name of Who is Calling:     What is the request in detail:  patient calling in reference to cancel scheduled procedure due mother passing away /patient  will call back when ready to reschedule Please contact to further discuss and advise      Can the clinic reply by [facility]:     What Number to Call Back if not in [facility]:  [phone]

## 2023-08-07 ENCOUNTER — OFFICE VISIT (OUTPATIENT)
Dept: DERMATOLOGY | Facility: CLINIC | Age: 58
End: 2023-08-07
Payer: COMMERCIAL

## 2023-08-07 DIAGNOSIS — D48.5 NEOPLASM OF UNCERTAIN BEHAVIOR OF SKIN: Primary | ICD-10-CM

## 2023-08-07 DIAGNOSIS — D22.9 NEVUS: ICD-10-CM

## 2023-08-07 DIAGNOSIS — L82.1 SK (SEBORRHEIC KERATOSIS): ICD-10-CM

## 2023-08-07 DIAGNOSIS — L70.0 ACNE VULGARIS: ICD-10-CM

## 2023-08-07 DIAGNOSIS — L91.8 SKIN TAG: ICD-10-CM

## 2023-08-07 PROCEDURE — 11102 TANGNTL BX SKIN SINGLE LES: CPT | Mod: S$GLB,,, | Performed by: DERMATOLOGY

## 2023-08-07 PROCEDURE — 88305 TISSUE EXAM BY PATHOLOGIST: CPT | Performed by: PATHOLOGY

## 2023-08-07 PROCEDURE — 88305 TISSUE EXAM BY PATHOLOGIST: ICD-10-PCS | Mod: 26,,, | Performed by: PATHOLOGY

## 2023-08-07 PROCEDURE — 1159F PR MEDICATION LIST DOCUMENTED IN MEDICAL RECORD: ICD-10-PCS | Mod: CPTII,S$GLB,, | Performed by: DERMATOLOGY

## 2023-08-07 PROCEDURE — 1159F MED LIST DOCD IN RCRD: CPT | Mod: CPTII,S$GLB,, | Performed by: DERMATOLOGY

## 2023-08-07 PROCEDURE — 1160F PR REVIEW ALL MEDS BY PRESCRIBER/CLIN PHARMACIST DOCUMENTED: ICD-10-PCS | Mod: CPTII,S$GLB,, | Performed by: DERMATOLOGY

## 2023-08-07 PROCEDURE — 11102 PR TANGENTIAL BIOPSY, SKIN, SINGLE LESION: ICD-10-PCS | Mod: S$GLB,,, | Performed by: DERMATOLOGY

## 2023-08-07 PROCEDURE — 1160F RVW MEDS BY RX/DR IN RCRD: CPT | Mod: CPTII,S$GLB,, | Performed by: DERMATOLOGY

## 2023-08-07 PROCEDURE — 99999 PR PBB SHADOW E&M-EST. PATIENT-LVL III: ICD-10-PCS | Mod: PBBFAC,,, | Performed by: DERMATOLOGY

## 2023-08-07 PROCEDURE — 88305 TISSUE EXAM BY PATHOLOGIST: CPT | Mod: 26,,, | Performed by: PATHOLOGY

## 2023-08-07 PROCEDURE — 99214 OFFICE O/P EST MOD 30 MIN: CPT | Mod: 25,S$GLB,, | Performed by: DERMATOLOGY

## 2023-08-07 PROCEDURE — 99999 PR PBB SHADOW E&M-EST. PATIENT-LVL III: CPT | Mod: PBBFAC,,, | Performed by: DERMATOLOGY

## 2023-08-07 PROCEDURE — 99214 PR OFFICE/OUTPT VISIT, EST, LEVL IV, 30-39 MIN: ICD-10-PCS | Mod: 25,S$GLB,, | Performed by: DERMATOLOGY

## 2023-08-07 RX ORDER — CLINDAMYCIN PHOSPHATE AND BENZOYL PEROXIDE 10; 50 MG/G; MG/G
GEL TOPICAL
Qty: 45 G | Refills: 1 | Status: SHIPPED | OUTPATIENT
Start: 2023-08-07

## 2023-08-07 RX ORDER — TRETINOIN 0.25 MG/G
CREAM TOPICAL
Qty: 30 G | Refills: 5 | Status: SHIPPED | OUTPATIENT
Start: 2023-08-07

## 2023-08-07 RX ORDER — SPIRONOLACTONE 50 MG/1
50 TABLET, FILM COATED ORAL EVERY MORNING
Qty: 30 TABLET | Refills: 2 | Status: SHIPPED | OUTPATIENT
Start: 2023-08-07 | End: 2023-11-08

## 2023-08-07 RX ORDER — SODIUM SULFACETAMIDE AND SULFUR 80; 40 MG/ML; MG/ML
SOLUTION TOPICAL
Qty: 473 ML | Refills: 3 | Status: SHIPPED | OUTPATIENT
Start: 2023-08-07

## 2023-08-07 NOTE — PROGRESS NOTES
Subjective:      Patient ID:  Grace Beltran is a 57 y.o. female who presents for   Chief Complaint   Patient presents with    Skin Check     UBSE    Acne     Face      Patient is here today for UBSE.    Patient is here today for a skin check.   Pt has a history of  moderate sun exposure in the past.   Pt recalls several blistering sunburns in the past- yes  Pt has history of tanning bed use- no  Pt has  had moles removed in the past- yes, multiple  Pt has history of melanoma in first degree relatives-  yes, maternal grandfather    Pt is also concerned about lesion on right arm - began to change color.   And acne on face x 15-20 years. Treats with sulfur soap and acne spot treatments. Pt gets cystic painful acne.     And skin tag around neck. Irritated with friction.          Review of Systems   Skin:  Positive for activity-related sunscreen use. Negative for daily sunscreen use and recent sunburn.   Hematologic/Lymphatic: Does not bruise/bleed easily.       Objective:   Physical Exam   Constitutional: She appears well-developed and well-nourished. No distress.   Neurological: She is alert and oriented to person, place, and time. She is not disoriented.   Psychiatric: She has a normal mood and affect.   Skin:   Areas Examined (abnormalities noted in diagram):   Scalp / Hair Palpated and Inspected  Head / Face Inspection Performed  Neck Inspection Performed  Chest / Axilla Inspection Performed  Abdomen Inspection Performed  Back Inspection Performed  RUE Inspected  LUE Inspection Performed  LLE Inspection Performed  Nails and Digits Inspection Performed                   Diagram Legend     Erythematous scaling macule/papule c/w actinic keratosis       Vascular papule c/w angioma      Pigmented verrucoid papule/plaque c/w seborrheic keratosis      Yellow umbilicated papule c/w sebaceous hyperplasia      Irregularly shaped tan macule c/w lentigo     1-2 mm smooth white papules consistent with Milia      Movable  subcutaneous cyst with punctum c/w epidermal inclusion cyst      Subcutaneous movable cyst c/w pilar cyst      Firm pink to brown papule c/w dermatofibroma      Pedunculated fleshy papule(s) c/w skin tag(s)      Evenly pigmented macule c/w junctional nevus     Mildly variegated pigmented, slightly irregular-bordered macule c/w mildly atypical nevus      Flesh colored to evenly pigmented papule c/w intradermal nevus       Pink pearly papule/plaque c/w basal cell carcinoma      Erythematous hyperkeratotic cursted plaque c/w SCC      Surgical scar with no sign of skin cancer recurrence      Open and closed comedones      Inflammatory papules and pustules      Verrucoid papule consistent consistent with wart     Erythematous eczematous patches and plaques     Dystrophic onycholytic nail with subungual debris c/w onychomycosis     Umbilicated papule    Erythematous-base heme-crusted tan verrucoid plaque consistent with inflamed seborrheic keratosis     Erythematous Silvery Scaling Plaque c/w Psoriasis     See annotation      Assessment / Plan:      Pathology Orders:       Normal Orders This Visit    Specimen to Pathology, Dermatology     Comments:    Number of Specimens:->1  ------------------------->-------------------------  Spec 1 Procedure:->Biopsy  Spec 1 Clinical Impression:->r/o bcc  Spec 1 Source:->right shoulder    Questions:    Procedure Type: Dermatology and skin neoplasms    Number of Specimens: 1    ------------------------: -------------------------    Spec 1 Procedure: Biopsy    Spec 1 Clinical Impression: r/o bcc    Spec 1 Source: right shoulder    Release to patient:           Neoplasm of uncertain behavior of skin  Shave biopsy procedure note:    Shave biopsy performed after verbal consent including risk of infection, scar, recurrence, need for additional treatment of site. Area prepped with alcohol, anesthetized with approximately 1.0cc of 1% lidocaine with epinephrine. Lesional tissue shaved with  razor blade. Hemostasis achieved with application of aluminum chloride followed by hyfrecation. No complications. Dressing applied. Wound care explained.    -     Specimen to Pathology, Dermatology    Acne vulgaris  -     spironolactone (ALDACTONE) 50 MG tablet; Take 1 tablet (50 mg total) by mouth every morning. Sig 1 po qd  Dispense: 30 tablet; Refill: 2  -     clindamycin-benzoyl peroxide gel; Aaa qam to face prn flare  Dispense: 45 g; Refill: 1  -     sulfacetamide sodium-sulfur (SULFACLEANSE 8-4) 8-4 % Susp; Wash face qhs  Dispense: 473 mL; Refill: 3  -     tretinoin (RETIN-A) 0.025 % cream; Compound tretinoin 0.025% / niacinamide 2% cream. Apply a pea-sized amount to entire face qhs then moisturize.  Dispense: 30 g; Refill: 5  PM:  Wash with sulfacleanser  Thin film of tretinoin 0.025% cream  all over every other to every night   Moisturize with cerave pm or vanicream daily moisturizer to minimize irritation    AM:  Wash with mild cleanser like cerave hydrating or vanicream daily gentle cleanser  2. Spot treat with benzoyl peroxide/clindamycin  3. Moisturizer with spf 30 +     Discussed benefits and risks of therapy including but not limited to breakthrough bleeding, breast tenderness, and elevated potassium levels which may give symptoms of fatigue, palpitations, and nausea. If patient runs low blood pressure, this could give symptoms of low blood pressure including lightheadedness. Patient should limit potassium intake - avoid potassium supplements or salt substitutes, limit bananas, coconut water and citrus fruits. Pregnancy must be avoided while taking spironolactone.  If high dose or prolonged use of NSAIDS are needed , be sure to stay well hydrated.        Skin tag..  Reassurance given to patient. No treatment is necessary.   Treatment of benign, asymptomatic lesions may be considered cosmetic.    Nevus  Discussed ABCDE's of nevi.  Monitor for new mole or moles that are becoming bigger, darker,  irritated, or developing irregular borders. Brochure provided. Instructed patient to observe lesion(s) for changes and follow up in clinic if changes are noted. Patient to monitor skin at home for new or changing lesions.     SK (seborrheic keratosis)  These are benign inherited growths without a malignant potential. Reassurance given to patient. No treatment is necessary.       Upper body skin examination performed today including at least 6 points as noted in physical examination. Suspicious lesions noted.    Recommend daily sun protection/avoidance and use of at least SPF 30, broad spectrum sunscreen (OTC drug).              Follow up in about 3 months (around 11/7/2023) for acne fololw up.

## 2023-08-07 NOTE — PATIENT INSTRUCTIONS
PM:  Wash with sulfacleanser  Thin film of tretinoin 0.025% cream  all over every other to every night   Moisturize with cerave pm or vanicream daily moisturizer to minimize irritation    AM:  Wash with mild cleanser like cerave hydrating or vanicream daily gentle cleanser  2. Spot treat with benzoyl peroxide/clindamycin  3. Moisturizer with spf 30 +     Discussed benefits and risks of therapy including but not limited to breakthrough bleeding, breast tenderness, and elevated potassium levels which may give symptoms of fatigue, palpitations, and nausea. If patient runs low blood pressure, this could give symptoms of low blood pressure including lightheadedness. Patient should limit potassium intake - avoid potassium supplements or salt substitutes, limit bananas, coconut water and citrus fruits. Pregnancy must be avoided while taking spironolactone.  If high dose or prolonged use of NSAIDS are needed , be sure to stay well hydrated.

## 2023-08-11 LAB
FINAL PATHOLOGIC DIAGNOSIS: NORMAL
GROSS: NORMAL
Lab: NORMAL
MICROSCOPIC EXAM: NORMAL

## 2023-08-14 NOTE — PROGRESS NOTES
Please schedule Electrodesiccation and curettage in 4-6 weeks. Please confirm that patient has no defibrillator.     Skin, right shoulder, shave biopsy:   -BASAL CELL CARCINOMA, SUPERFICIAL TYPE, EXTENDING TO A PERIPHERAL BIOPSY

## 2023-09-18 ENCOUNTER — OFFICE VISIT (OUTPATIENT)
Dept: DERMATOLOGY | Facility: CLINIC | Age: 58
End: 2023-09-18
Payer: COMMERCIAL

## 2023-09-18 DIAGNOSIS — C44.612 BASAL CELL CARCINOMA (BCC) OF RIGHT SHOULDER: ICD-10-CM

## 2023-09-18 PROBLEM — Z85.828 PERSONAL HISTORY OF SKIN CANCER: Status: ACTIVE | Noted: 2023-09-18

## 2023-09-18 PROCEDURE — 17262 PR DESTR MALIG TRUNK,EXTREM 1.1-2 CM: ICD-10-PCS | Mod: S$GLB,,, | Performed by: DERMATOLOGY

## 2023-09-18 PROCEDURE — 99499 NO LOS: ICD-10-PCS | Mod: S$GLB,,, | Performed by: DERMATOLOGY

## 2023-09-18 PROCEDURE — 99499 UNLISTED E&M SERVICE: CPT | Mod: S$GLB,,, | Performed by: DERMATOLOGY

## 2023-09-18 PROCEDURE — 99999 PR PBB SHADOW E&M-EST. PATIENT-LVL III: ICD-10-PCS | Mod: PBBFAC,,, | Performed by: DERMATOLOGY

## 2023-09-18 PROCEDURE — 99999 PR PBB SHADOW E&M-EST. PATIENT-LVL III: CPT | Mod: PBBFAC,,, | Performed by: DERMATOLOGY

## 2023-09-18 PROCEDURE — 17262 DSTRJ MAL LES T/A/L 1.1-2.0: CPT | Mod: S$GLB,,, | Performed by: DERMATOLOGY

## 2023-09-18 NOTE — PROGRESS NOTES
Subjective:      Patient ID:  Grace Beltran is a 57 y.o. female who presents for   Chief Complaint   Patient presents with    Skin Cancer     ED&C - R shoulder     Pt here today for ED&C to R shoulder     Final Pathologic Diagnosis       Date                                 08/07/2023                                                           Skin, right shoulder, shave biopsy:   -BASAL CELL CARCINOMA, SUPERFICIAL TYPE, EXTENDING TO A PERIPHERAL BIOPSY EDGE        Review of Systems   Skin:  Positive for activity-related sunscreen use. Negative for daily sunscreen use and recent sunburn.   Hematologic/Lymphatic: Does not bruise/bleed easily.       Objective:   Physical Exam   Skin:   Areas Examined (abnormalities noted in diagram):   RUE Inspected            Diagram Legend     Erythematous scaling macule/papule c/w actinic keratosis       Vascular papule c/w angioma      Pigmented verrucoid papule/plaque c/w seborrheic keratosis      Yellow umbilicated papule c/w sebaceous hyperplasia      Irregularly shaped tan macule c/w lentigo     1-2 mm smooth white papules consistent with Milia      Movable subcutaneous cyst with punctum c/w epidermal inclusion cyst      Subcutaneous movable cyst c/w pilar cyst      Firm pink to brown papule c/w dermatofibroma      Pedunculated fleshy papule(s) c/w skin tag(s)      Evenly pigmented macule c/w junctional nevus     Mildly variegated pigmented, slightly irregular-bordered macule c/w mildly atypical nevus      Flesh colored to evenly pigmented papule c/w intradermal nevus       Pink pearly papule/plaque c/w basal cell carcinoma      Erythematous hyperkeratotic cursted plaque c/w SCC      Surgical scar with no sign of skin cancer recurrence      Open and closed comedones      Inflammatory papules and pustules      Verrucoid papule consistent consistent with wart     Erythematous eczematous patches and plaques     Dystrophic onycholytic nail with subungual debris c/w  onychomycosis     Umbilicated papule    Erythematous-base heme-crusted tan verrucoid plaque consistent with inflamed seborrheic keratosis     Erythematous Silvery Scaling Plaque c/w Psoriasis     See annotation      Assessment / Plan:        Basal cell carcinoma (BCC) of right shoulder  Here for electrodesiccation and curettage of Superficial BCC on the right shoulder. bx done on 08/7/23:      Electrodessication and Curettage Procedure note:    Verbal consent obtained. Lesional tissue marked and prepped with alcohol. Lesion anesthetized with 1% lidocaine with epinephrine. Curettage and Desiccation x 3 cycles to base. Aluminum chloride for hemostasis. Lesion size after primary curettage: 1.3 cm    Area bandaged and wound care explained.           Follow up in about 6 months (around 3/18/2024) for UBSE.

## 2023-11-06 DIAGNOSIS — L70.0 ACNE VULGARIS: ICD-10-CM

## 2023-11-07 ENCOUNTER — OFFICE VISIT (OUTPATIENT)
Dept: PRIMARY CARE CLINIC | Facility: CLINIC | Age: 58
End: 2023-11-07
Payer: COMMERCIAL

## 2023-11-07 VITALS
SYSTOLIC BLOOD PRESSURE: 108 MMHG | DIASTOLIC BLOOD PRESSURE: 70 MMHG | OXYGEN SATURATION: 94 % | HEART RATE: 73 BPM | BODY MASS INDEX: 27.03 KG/M2 | WEIGHT: 158.31 LBS | HEIGHT: 64 IN

## 2023-11-07 DIAGNOSIS — R53.83 FATIGUE, UNSPECIFIED TYPE: ICD-10-CM

## 2023-11-07 DIAGNOSIS — R63.5 WEIGHT GAIN: ICD-10-CM

## 2023-11-07 DIAGNOSIS — M53.3 COCCYX PAIN: Primary | ICD-10-CM

## 2023-11-07 PROCEDURE — 99214 OFFICE O/P EST MOD 30 MIN: CPT | Mod: S$GLB,,, | Performed by: FAMILY MEDICINE

## 2023-11-07 PROCEDURE — 3078F DIAST BP <80 MM HG: CPT | Mod: CPTII,S$GLB,, | Performed by: FAMILY MEDICINE

## 2023-11-07 PROCEDURE — 99999 PR PBB SHADOW E&M-EST. PATIENT-LVL IV: ICD-10-PCS | Mod: PBBFAC,,, | Performed by: FAMILY MEDICINE

## 2023-11-07 PROCEDURE — 3008F PR BODY MASS INDEX (BMI) DOCUMENTED: ICD-10-PCS | Mod: CPTII,S$GLB,, | Performed by: FAMILY MEDICINE

## 2023-11-07 PROCEDURE — 3074F SYST BP LT 130 MM HG: CPT | Mod: CPTII,S$GLB,, | Performed by: FAMILY MEDICINE

## 2023-11-07 PROCEDURE — 1160F PR REVIEW ALL MEDS BY PRESCRIBER/CLIN PHARMACIST DOCUMENTED: ICD-10-PCS | Mod: CPTII,S$GLB,, | Performed by: FAMILY MEDICINE

## 2023-11-07 PROCEDURE — 3078F PR MOST RECENT DIASTOLIC BLOOD PRESSURE < 80 MM HG: ICD-10-PCS | Mod: CPTII,S$GLB,, | Performed by: FAMILY MEDICINE

## 2023-11-07 PROCEDURE — 1160F RVW MEDS BY RX/DR IN RCRD: CPT | Mod: CPTII,S$GLB,, | Performed by: FAMILY MEDICINE

## 2023-11-07 PROCEDURE — 1159F MED LIST DOCD IN RCRD: CPT | Mod: CPTII,S$GLB,, | Performed by: FAMILY MEDICINE

## 2023-11-07 PROCEDURE — 3008F BODY MASS INDEX DOCD: CPT | Mod: CPTII,S$GLB,, | Performed by: FAMILY MEDICINE

## 2023-11-07 PROCEDURE — 99214 PR OFFICE/OUTPT VISIT, EST, LEVL IV, 30-39 MIN: ICD-10-PCS | Mod: S$GLB,,, | Performed by: FAMILY MEDICINE

## 2023-11-07 PROCEDURE — 1159F PR MEDICATION LIST DOCUMENTED IN MEDICAL RECORD: ICD-10-PCS | Mod: CPTII,S$GLB,, | Performed by: FAMILY MEDICINE

## 2023-11-07 PROCEDURE — 99999 PR PBB SHADOW E&M-EST. PATIENT-LVL IV: CPT | Mod: PBBFAC,,, | Performed by: FAMILY MEDICINE

## 2023-11-07 PROCEDURE — 3074F PR MOST RECENT SYSTOLIC BLOOD PRESSURE < 130 MM HG: ICD-10-PCS | Mod: CPTII,S$GLB,, | Performed by: FAMILY MEDICINE

## 2023-11-07 NOTE — ASSESSMENT & PLAN NOTE
Worse when sleeping, then lies on side & hip hurts. Hx hip surgery. Not sleeping    Ortho NP Agustin ordered -   ganglion block by Pain Management, but wasn't ready to schedule this due to low potential for relief (50%) she tried methocarbamoL. Did not take  celecoxib     She would like more evaluation for the cause of her discomfort. Refer to Back & spine

## 2023-11-07 NOTE — PROGRESS NOTES
"      Assessment:     1. Coccyx pain    2. Fatigue, unspecified type          Plan:     Coccyx pain  Worse when sleeping, then lies on side & hip hurts. Hx hip surgery. Not sleeping    Ortho NP Agustin ordered -   ganglion block by Pain Management, but wasn't ready to schedule this due to low potential for relief (50%) she tried methocarbamoL. Did not take  celecoxib     She would like more evaluation for the cause of her discomfort. Refer to Back & spine    Fatigue  May be linked to not sleeping with coccyx pain  Requests labs          CHIEF COMPLAINT: coccyx pain    HPI: Grace Beltran is a 58 y.o. female with is here today for coccyx pain    MPARISON:  Sacrum and coccyx radiograph dated 04/19/2018     FINDINGS:  SI joints appear symmetric.  Visualized sacral arcuate lines appear maintained.  No acute fracture, dislocation, or osseous destruction.     Impression:     As above.        Electronically signed by: Blue Myles  Date:                                            02/07/2023    FINDINGS:  Lumbar levocurvature.  Lumbar vertebral body heights appear maintained.  No advanced disc space narrowing.  No evidence for lytic or blastic lesion.     Impression:     As above.        Electronically signed by: Blue Myles  Date:                                            02/07/2023    Current Outpatient Medications   Medication Instructions    celecoxib (CELEBREX) 100 mg, Oral, Nightly PRN    citalopram (CELEXA) 40 MG tablet TAKE 1 TABLET BY MOUTH EVERY DAY    clindamycin-benzoyl peroxide gel Aaa qam to face prn flare    spironolactone (ALDACTONE) 50 mg, Oral, Every morning, Sig 1 po qd    sulfacetamide sodium-sulfur (SULFACLEANSE 8-4) 8-4 % Susp Wash face qhs    tretinoin (RETIN-A) 0.025 % cream Compound tretinoin 0.025% / niacinamide 2% cream. Apply a pea-sized amount to entire face qhs then moisturize.       No results found for: "HGBA1C"  No results found for: "MICALBCREAT"  Lab Results   Component Value " Date    LDLCALC 131.4 02/15/2022    LDLCALC 124.0 12/10/2020    CHOL 227 (H) 02/15/2022    HDL 74 02/15/2022    TRIG 108 02/15/2022       Lab Results   Component Value Date     01/20/2023    K 4.5 01/20/2023     01/20/2023    CO2 31 (H) 01/20/2023    GLU 90 01/20/2023    BUN 10 01/20/2023    CREATININE 0.8 01/20/2023    CALCIUM 9.7 01/20/2023    PROT 6.9 01/20/2023    ALBUMIN 3.8 01/20/2023    BILITOT 0.4 01/20/2023    ALKPHOS 82 01/20/2023    AST 20 01/20/2023    ALT 21 01/20/2023    ANIONGAP 6 (L) 01/20/2023    ESTGFRAFRICA >60.0 02/15/2022    EGFRNONAA >60.0 02/15/2022    WBC 3.76 (L) 02/15/2022    HGB 14.2 02/15/2022    HGB 13.7 12/10/2020    HCT 43.2 02/15/2022    MCV 90 02/15/2022     02/15/2022    TSH 0.944 03/03/2022    HEPCAB Negative 05/18/2017       Lab Results   Component Value Date    FSH 6.2 01/12/2004    PROGESTERONE 16.3 (H) 02/25/2004    ESTRADIOL 110 01/12/2004    NMTNUUDB10RB 23 (L) 01/20/2023    JXMZEGPI44MR 41 03/03/2022         Past Medical History:   Diagnosis Date    Acne     Dermatologist prescribes spironolactone     Anxiety     celexa 40    Basal cell carcinoma     Brachioradial pruritus 08/11/2022    Chronic constipation 05/24/2017    History of patellar fracture 07/17/2019    L, slipped at home    Hot flashes due to menopause 05/24/2017    Lumbar scoliosis     Osteoarthritis of spine with radiculopathy, cervical region 08/11/2022    CT 2014, pruritis    Other osteoporosis without current pathological fracture 05/24/2017    Reclast with Rheum, ankle & patella fracture, mom with dx    Primary osteoarthritis of left hip 05/24/2017    Dr Garay, dry needling at [facility] helping, MRI 2016, Injection didn't help much    Right elbow pain 07/20/2017    dry needling at [facility] helping    Vitamin D deficiency 08/19/2019    Dr Blas started 50,000 q 7d in August 2019     Past Surgical History:   Procedure Laterality Date    COLONOSCOPY N/A 4/8/2019    Procedure: COLONOSCOPY;   "Surgeon: Rakesh Parmar MD;  Location: [facility] ENDO (4TH FLR);  Service: Endoscopy;  Laterality: N/A; significantly excessive looping of the colon    COLONOSCOPY N/A 6/13/2019    Procedure: COLONOSCOPY - Device-assisted;  Surgeon: Sravan Haji MD;  Location: [facility] ENDO (2ND FLR);  Service: Endoscopy;  Laterality: N/A;  Single-balloon scope; unremarkable, repeat in 10 years for screening    right hip surgery      April 24, 2012     Vitals:    11/07/23 1350   BP: 108/70   Pulse: 73   SpO2: (!) 94%   Weight: 71.8 kg (158 lb 4.6 oz)   Height: 5' 4" (1.626 m)   PainSc:   2   PainLoc: Back     Objective:   Physical Exam  Musculoskeletal:      Comments:   Normal gait, can walk on toes and heels, uneven abdominal flank folds on mid back, leg length discrepancy, can lean forward and touch toes, and lean back and side to side without discomfort,  nontender lumbar spine, nontender paraspinous musculature, nontender sacroiliacs, tender coccyx,  negative straight leg test, no swelling ankles                                     "

## 2023-11-07 NOTE — TELEPHONE ENCOUNTER
Please see the attached refill request.    Last seen 09/2023    Basal cell carcinoma (BCC) of right shoulder  Here for electrodesiccation and curettage of Superficial BCC on the right shoulder. bx done on 08/7/23:        Electrodessication and Curettage Procedure note:     Verbal consent obtained. Lesional tissue marked and prepped with alcohol. Lesion anesthetized with 1% lidocaine with epinephrine. Curettage and Desiccation x 3 cycles to base. Aluminum chloride for hemostasis. Lesion size after primary curettage: 1.3 cm     Area bandaged and wound care explained.           Follow up in about 6 months (around 3/18/2024) for UBSE.

## 2023-11-08 RX ORDER — SPIRONOLACTONE 50 MG/1
TABLET, FILM COATED ORAL
Qty: 30 TABLET | Refills: 2 | Status: SHIPPED | OUTPATIENT
Start: 2023-11-08

## 2023-11-17 ENCOUNTER — LAB VISIT (OUTPATIENT)
Dept: LAB | Facility: HOSPITAL | Age: 58
End: 2023-11-17
Attending: FAMILY MEDICINE
Payer: COMMERCIAL

## 2023-11-17 DIAGNOSIS — R63.5 WEIGHT GAIN: ICD-10-CM

## 2023-11-17 DIAGNOSIS — R53.83 FATIGUE, UNSPECIFIED TYPE: ICD-10-CM

## 2023-11-17 LAB
ALBUMIN SERPL BCP-MCNC: 3.7 G/DL (ref 3.5–5.2)
ALP SERPL-CCNC: 81 U/L (ref 55–135)
ALT SERPL W/O P-5'-P-CCNC: 24 U/L (ref 10–44)
ANION GAP SERPL CALC-SCNC: 9 MMOL/L (ref 8–16)
AST SERPL-CCNC: 20 U/L (ref 10–40)
BILIRUB SERPL-MCNC: 0.4 MG/DL (ref 0.1–1)
BUN SERPL-MCNC: 13 MG/DL (ref 6–20)
CALCIUM SERPL-MCNC: 9.2 MG/DL (ref 8.7–10.5)
CHLORIDE SERPL-SCNC: 103 MMOL/L (ref 95–110)
CHOLEST SERPL-MCNC: 234 MG/DL (ref 120–199)
CHOLEST/HDLC SERPL: 3.2 {RATIO} (ref 2–5)
CO2 SERPL-SCNC: 30 MMOL/L (ref 23–29)
CREAT SERPL-MCNC: 0.8 MG/DL (ref 0.5–1.4)
ERYTHROCYTE [DISTWIDTH] IN BLOOD BY AUTOMATED COUNT: 12.2 % (ref 11.5–14.5)
EST. GFR  (NO RACE VARIABLE): >60 ML/MIN/1.73 M^2
ESTIMATED AVG GLUCOSE: 108 MG/DL (ref 68–131)
GLUCOSE SERPL-MCNC: 82 MG/DL (ref 70–110)
HBA1C MFR BLD: 5.4 % (ref 4–5.6)
HCT VFR BLD AUTO: 41.8 % (ref 37–48.5)
HDLC SERPL-MCNC: 73 MG/DL (ref 40–75)
HDLC SERPL: 31.2 % (ref 20–50)
HGB BLD-MCNC: 13.6 G/DL (ref 12–16)
LDLC SERPL CALC-MCNC: 141.8 MG/DL (ref 63–159)
MCH RBC QN AUTO: 30 PG (ref 27–31)
MCHC RBC AUTO-ENTMCNC: 32.5 G/DL (ref 32–36)
MCV RBC AUTO: 92 FL (ref 82–98)
NONHDLC SERPL-MCNC: 161 MG/DL
PLATELET # BLD AUTO: 273 K/UL (ref 150–450)
PMV BLD AUTO: 10 FL (ref 9.2–12.9)
POTASSIUM SERPL-SCNC: 4.4 MMOL/L (ref 3.5–5.1)
PROT SERPL-MCNC: 6.9 G/DL (ref 6–8.4)
RBC # BLD AUTO: 4.53 M/UL (ref 4–5.4)
SODIUM SERPL-SCNC: 142 MMOL/L (ref 136–145)
TRIGL SERPL-MCNC: 96 MG/DL (ref 30–150)
TSH SERPL DL<=0.005 MIU/L-ACNC: 1.37 UIU/ML (ref 0.4–4)
WBC # BLD AUTO: 4.22 K/UL (ref 3.9–12.7)

## 2023-11-17 PROCEDURE — 36415 COLL VENOUS BLD VENIPUNCTURE: CPT | Mod: PN | Performed by: FAMILY MEDICINE

## 2023-11-17 PROCEDURE — 83036 HEMOGLOBIN GLYCOSYLATED A1C: CPT | Performed by: FAMILY MEDICINE

## 2023-11-17 PROCEDURE — 80061 LIPID PANEL: CPT | Performed by: FAMILY MEDICINE

## 2023-11-17 PROCEDURE — 84443 ASSAY THYROID STIM HORMONE: CPT | Performed by: FAMILY MEDICINE

## 2023-11-17 PROCEDURE — 80053 COMPREHEN METABOLIC PANEL: CPT | Performed by: FAMILY MEDICINE

## 2023-11-17 PROCEDURE — 85027 COMPLETE CBC AUTOMATED: CPT | Performed by: FAMILY MEDICINE

## 2023-11-20 ENCOUNTER — OFFICE VISIT (OUTPATIENT)
Dept: SPINE | Facility: CLINIC | Age: 58
End: 2023-11-20
Payer: COMMERCIAL

## 2023-11-20 VITALS
TEMPERATURE: 98 F | BODY MASS INDEX: 25.35 KG/M2 | HEART RATE: 68 BPM | RESPIRATION RATE: 18 BRPM | OXYGEN SATURATION: 100 % | DIASTOLIC BLOOD PRESSURE: 85 MMHG | WEIGHT: 147.69 LBS | SYSTOLIC BLOOD PRESSURE: 135 MMHG

## 2023-11-20 DIAGNOSIS — M53.3 COCCYX PAIN: Primary | ICD-10-CM

## 2023-11-20 PROCEDURE — 99204 OFFICE O/P NEW MOD 45 MIN: CPT | Mod: S$GLB,,, | Performed by: ANESTHESIOLOGY

## 2023-11-20 PROCEDURE — 3044F HG A1C LEVEL LT 7.0%: CPT | Mod: CPTII,S$GLB,, | Performed by: ANESTHESIOLOGY

## 2023-11-20 PROCEDURE — 3079F PR MOST RECENT DIASTOLIC BLOOD PRESSURE 80-89 MM HG: ICD-10-PCS | Mod: CPTII,S$GLB,, | Performed by: ANESTHESIOLOGY

## 2023-11-20 PROCEDURE — 1159F MED LIST DOCD IN RCRD: CPT | Mod: CPTII,S$GLB,, | Performed by: ANESTHESIOLOGY

## 2023-11-20 PROCEDURE — 99204 PR OFFICE/OUTPT VISIT, NEW, LEVL IV, 45-59 MIN: ICD-10-PCS | Mod: S$GLB,,, | Performed by: ANESTHESIOLOGY

## 2023-11-20 PROCEDURE — 99999 PR PBB SHADOW E&M-EST. PATIENT-LVL IV: CPT | Mod: PBBFAC,,, | Performed by: ANESTHESIOLOGY

## 2023-11-20 PROCEDURE — 99999 PR PBB SHADOW E&M-EST. PATIENT-LVL IV: ICD-10-PCS | Mod: PBBFAC,,, | Performed by: ANESTHESIOLOGY

## 2023-11-20 PROCEDURE — 3044F PR MOST RECENT HEMOGLOBIN A1C LEVEL <7.0%: ICD-10-PCS | Mod: CPTII,S$GLB,, | Performed by: ANESTHESIOLOGY

## 2023-11-20 PROCEDURE — 3075F PR MOST RECENT SYSTOLIC BLOOD PRESS GE 130-139MM HG: ICD-10-PCS | Mod: CPTII,S$GLB,, | Performed by: ANESTHESIOLOGY

## 2023-11-20 PROCEDURE — 3008F PR BODY MASS INDEX (BMI) DOCUMENTED: ICD-10-PCS | Mod: CPTII,S$GLB,, | Performed by: ANESTHESIOLOGY

## 2023-11-20 PROCEDURE — 3079F DIAST BP 80-89 MM HG: CPT | Mod: CPTII,S$GLB,, | Performed by: ANESTHESIOLOGY

## 2023-11-20 PROCEDURE — 3075F SYST BP GE 130 - 139MM HG: CPT | Mod: CPTII,S$GLB,, | Performed by: ANESTHESIOLOGY

## 2023-11-20 PROCEDURE — 3008F BODY MASS INDEX DOCD: CPT | Mod: CPTII,S$GLB,, | Performed by: ANESTHESIOLOGY

## 2023-11-20 PROCEDURE — 1159F PR MEDICATION LIST DOCUMENTED IN MEDICAL RECORD: ICD-10-PCS | Mod: CPTII,S$GLB,, | Performed by: ANESTHESIOLOGY

## 2023-11-20 NOTE — PROGRESS NOTES
PCP: Evelia Wynn MD    REFERRING PHYSICIAN: Evelia Wynn MD    CHIEF COMPLAINT: Coccyx Pain    Original HISTORY OF PRESENT ILLNESS: Grace Beltran presents to the clinic for the evaluation of the above pain. The pain started last December and initially attributed to mothers hard mattress but has only gotten worse.    Original Pain Description:  The pain is located in the tailbone and does not radiate. The pain is described as sharp. Exacerbating factors: Laying, prolonged walking 1-2 miles, and rolling motions on tailbone. Sitting does not exacerbate it. Mitigating factors lying on a pill. Symptoms interfere with daily activity and sleeping. The patient feels like symptoms have been worsening especially with interrupted sleep. Only sleep 1-2 hours uninterrupted requiring a pillow under her tailbone. Sleeps best in recliner. Patient denies night fever/night sweats, urinary incontinence, bowel incontinence, significant weight loss, significant motor weakness, and loss of sensations.    Original PAIN SCORES:  Best: Pain is 0  Worst: Pain is 8  Current: Pain is 3        11/20/2023    10:11 AM   Last 3 PDI Scores   Pain Disability Index (PDI) 28       INTERVAL HISTORY: (Newest visit at the bottom)   Interval History (Date):       6 weeks of Conservative therapy:  PT: (Must include dates)  Chiro:  HEP: Ms. Beltran walks twice daily and continues HEP program twice weekly.       Treatments / Medications: (Ice/Heat/NSAIDS/APAP/etc):  Celebrex prescribed - has not tried yet    Interventional Pain Procedures: (Previous injections)  N/a    Past Medical History:   Diagnosis Date    Acne     Dermatologist prescribes spironolactone     Anxiety     celexa 39    Basal cell carcinoma     Brachioradial pruritus 08/11/2022    Chronic constipation 05/24/2017    History of patellar fracture 07/17/2019    L, slipped at home    Hot flashes due to menopause 05/24/2017    Lumbar scoliosis     Osteoarthritis of spine with  radiculopathy, cervical region 08/11/2022    CT 2014, pruritis    Other osteoporosis without current pathological fracture 05/24/2017    Reclast with Rheum, ankle & patella fracture, mom with dx    Primary osteoarthritis of left hip 05/24/2017    Dr Garay, dry needling at [facility] helping, MRI 2016, Injection didn't help much    Right elbow pain 07/20/2017    dry needling at [facility] helping    Vitamin D deficiency 08/19/2019    Dr Blas started 50,000 q 7d in August 2019     Past Surgical History:   Procedure Laterality Date    COLONOSCOPY N/A 4/8/2019    Procedure: COLONOSCOPY;  Surgeon: Rakesh Parmar MD;  Location: [facility] ENDO (4TH FLR);  Service: Endoscopy;  Laterality: N/A; significantly excessive looping of the colon    COLONOSCOPY N/A 6/13/2019    Procedure: COLONOSCOPY - Device-assisted;  Surgeon: Sravan Haji MD;  Location: [facility] ENDO (2ND FLR);  Service: Endoscopy;  Laterality: N/A;  Single-balloon scope; unremarkable, repeat in 10 years for screening    right hip surgery      April 24, 2012     Social History     Socioeconomic History    Marital status: Single   Tobacco Use    Smoking status: Never    Smokeless tobacco: Never   Substance and Sexual Activity    Alcohol use: Yes     Alcohol/week: 8.0 standard drinks of alcohol     Types: 4 Standard drinks or equivalent, 4 Cans of beer per week    Drug use: No    Sexual activity: Yes     Partners: Female     Family History   Problem Relation Age of Onset    Hypertension Mother     Hyperlipidemia Mother     Osteoporosis Mother     Breast cancer Mother 81    Skin cancer Father     Breast cancer Maternal Aunt 65    Melanoma Maternal Grandfather     Colon cancer Neg Hx     Colon polyps Neg Hx     Crohn's disease Neg Hx     Ulcerative colitis Neg Hx     Stomach cancer Neg Hx     Esophageal cancer Neg Hx        Review of patient's allergies indicates:   Allergen Reactions    Codeine      Other reaction(s): Nausea    Hydrocodone      Other reaction(s):  Vomiting  Other reaction(s): Nausea    Promethazine      Other reaction(s): Vomiting  Other reaction(s): Nausea       Current Outpatient Medications   Medication Sig    citalopram (CELEXA) 40 MG tablet TAKE 1 TABLET BY MOUTH EVERY DAY    clindamycin-benzoyl peroxide gel Aaa qam to face prn flare    spironolactone (ALDACTONE) 50 MG tablet TAKE 1 TABLET(50 MG) BY MOUTH EVERY MORNING    sulfacetamide sodium-sulfur (SULFACLEANSE 8-4) 8-4 % Susp Wash face qhs    tretinoin (RETIN-A) 0.025 % cream Compound tretinoin 0.025% / niacinamide 2% cream. Apply a pea-sized amount to entire face qhs then moisturize.    celecoxib (CELEBREX) 100 MG capsule Take 1 capsule (100 mg total) by mouth nightly as needed for Pain. (Patient not taking: Reported on 11/20/2023)     No current facility-administered medications for this visit.       ROS:  GENERAL: No fever. No chills. No fatigue. Denies weight loss. Denies weight gain.  HEENT: Denies headaches. Denies vision change. Denies eye pain. Denies double vision. Denies ear pain.   CV: Denies chest pain.   PULM: Denies of shortness of breath.  GI: Denies constipation. No diarrhea. No abdominal pain. Denies nausea. Denies vomiting. No blood in stool.  HEME: Denies bleeding problems.  : Denies urgency. No painful urination. No blood in urine.  MS: Denies joint stiffness. Denies joint swelling.  Denies back pain.  SKIN: Denies rash.   NEURO: Denies seizures. No weakness.  PSYCH:  Denies difficulty sleeping. No anxiety. Denies depression. No suicidal thoughts.       VITALS:   Vitals:    11/20/23 1009 11/20/23 1012   BP: (!) 142/91 135/85   Pulse: 68    Resp: 18    Temp: 98.3 °F (36.8 °C)    TempSrc: Oral    SpO2: 100%    Weight: 67 kg (147 lb 11.3 oz)    PainSc:   7    PainLoc: Buttocks          PHYSICAL EXAM:   GENERAL: Well appearing, in no acute distress, alert and oriented x3.  PSYCH:  Mood and affect appropriate.  SKIN: Skin color, texture, turgor normal, no rashes or lesions.  HEENT:   Normocephalic, atraumatic. Cranial nerves grossly intact.  NECK: No pain to palpation over the cervical paraspinous muscles. No pain to palpation over facets. No pain with neck flexion, extension, or lateral flexion.   PULM: No evidence of respiratory difficulty, symmetric chest rise.  GI:  Non-distended  BACK: Normal range of motion. No pain to palpation over the spinous processes. No pain to palpation over facet joints. There is no pain with palpation over the sacroiliac joints bilaterally.   EXTREMITIES: No deformities, edema, or skin discoloration.   MUSCULOSKELETAL: Shoulder, hip, and knee provocative maneuvers are negative. No atrophy is noted.  NEURO: Sensation is equal and appropriate bilaterally. Bilateral upper and lower extremity strength is normal and symmetric. Bilateral upper and lower extremity coordination and muscle stretch reflexes are physiologic and symmetric. Plantar response are downgoing. Straight leg raising in the supine position is negative to radicular pain.   GAIT: normal.      LABS:      IMAGING:    X-ray Sacrum and Coccyx  Narrative & Impression  EXAMINATION:  XR SACRUM AND COCCYX     CLINICAL HISTORY:  Sacrococcygeal disorders, not elsewhere classified     TECHNIQUE:  Two or three views of the sacrum and coccyx were performed.     COMPARISON:  Sacrum and coccyx radiograph dated 04/19/2018     FINDINGS:  SI joints appear symmetric.  Visualized sacral arcuate lines appear maintained.  No acute fracture, dislocation, or osseous destruction.     Impression:     As above.        Electronically signed by: Blue Myles  Date:                                            02/07/2023  Time:                                           09:48        X-ray Lumbar Spine  Narrative & Impression  EXAMINATION:  XR LUMBAR SPINE AP AND LATERAL     CLINICAL HISTORY:  Low back pain, unspecified     TECHNIQUE:  AP, lateral and spot images were performed of the lumbar spine.     COMPARISON:  None      FINDINGS:  Lumbar levocurvature.  Lumbar vertebral body heights appear maintained.  No advanced disc space narrowing.  No evidence for lytic or blastic lesion.     Impression:     As above.        Electronically signed by: Blue Myles  Date:                                            02/07/2023  Time:                                           09:44      ASSESSMENT: 58 y.o. year old female with pain, consistent with:    Encounter Diagnosis   Name Primary?    Coccyx pain Yes       DISCUSSION: Gracemili Beltran is an  who comes to us with coccygeal pain which is worst with lying flat at night. It is better on a softer mattress. Sitting does not illicit pain. Imaging shows a prominent coccyx with minimal padding but no obvious pathology. Exam shows pain reproduced with palpation over low sacrum and coccyx.       PLAN:  Reviewed imaging with Ms. Beltran  Recommend heat  Start Celebrex 100mg nightly as previously prescribed  Consider additional padding or trial of a softer mattress  Discussed pelvic floor therapy, which she defers at this time  Return to clinic as needed.    I would like to thank Evelia Wynn MD for the opportunity to assist in the care of this patient. We had a very nice visit and I look forward to continuing their care. Please let me know if I can be of further assistance.     Parker Ayers MD  [facility]  11/20/2023

## 2024-02-04 ENCOUNTER — HOSPITAL ENCOUNTER (EMERGENCY)
Facility: OTHER | Age: 59
Discharge: HOME | End: 2024-02-04
Attending: EMERGENCY MEDICINE
Payer: COMMERCIAL

## 2024-02-04 VITALS
BODY MASS INDEX: 26.98 KG/M2 | HEART RATE: 88 BPM | TEMPERATURE: 98 F | DIASTOLIC BLOOD PRESSURE: 72 MMHG | RESPIRATION RATE: 18 BRPM | WEIGHT: 158 LBS | SYSTOLIC BLOOD PRESSURE: 131 MMHG | HEIGHT: 64 IN | OXYGEN SATURATION: 99 %

## 2024-02-04 DIAGNOSIS — S92.155A CLOSED NONDISPLACED AVULSION FRACTURE OF LEFT TALUS, INITIAL ENCOUNTER: ICD-10-CM

## 2024-02-04 DIAGNOSIS — T14.90XA INJURY: ICD-10-CM

## 2024-02-04 DIAGNOSIS — S92.415A NONDISPLACED FRACTURE OF PROXIMAL PHALANX OF LEFT GREAT TOE, INITIAL ENCOUNTER FOR CLOSED FRACTURE: Primary | ICD-10-CM

## 2024-02-04 PROCEDURE — 63600175 PHARM REV CODE 636 W HCPCS

## 2024-02-04 PROCEDURE — 96372 THER/PROPH/DIAG INJ SC/IM: CPT

## 2024-02-04 PROCEDURE — 99284 EMERGENCY DEPT VISIT MOD MDM: CPT | Mod: 25

## 2024-02-04 RX ORDER — MELOXICAM 15 MG/1
15 TABLET ORAL DAILY
Qty: 30 TABLET | Refills: 0 | Status: SHIPPED | OUTPATIENT
Start: 2024-02-04 | End: 2024-02-20

## 2024-02-04 RX ORDER — KETOROLAC TROMETHAMINE 30 MG/ML
15 INJECTION, SOLUTION INTRAMUSCULAR; INTRAVENOUS
Status: COMPLETED | OUTPATIENT
Start: 2024-02-04 | End: 2024-02-04

## 2024-02-04 RX ADMIN — KETOROLAC TROMETHAMINE 15 MG: 30 INJECTION, SOLUTION INTRAMUSCULAR; INTRAVENOUS at 06:02

## 2024-02-05 ENCOUNTER — TELEPHONE (OUTPATIENT)
Dept: PODIATRY | Facility: CLINIC | Age: 59
End: 2024-02-05
Payer: COMMERCIAL

## 2024-02-05 NOTE — ED TRIAGE NOTES
Pt presents to the ED with c/o left foot pain today. Pt reports being at parade and slipping on beads. Pt able to BEE. Pt AAOx3, NAD noted.

## 2024-02-05 NOTE — DISCHARGE INSTRUCTIONS
Start taking Mobic daily as an anti-inflammatory.  Continue to apply ice and elevate your foot above the level of your heart.

## 2024-02-05 NOTE — ED PROVIDER NOTES
Encounter Date: 2/4/2024       History     Chief Complaint   Patient presents with    Foot Injury     Pt states that she slipped on some beads during a parade and bent foot forward and now has pain. Pt states that she is unable to bear weight on extremity.      This is a 58-year-old female with osteoporosis who presents to the ED with complaints of left foot pain after a mechanical fall that happened just prior to arrival.  Patient states that she was walking at the parade when she slipped on a bead and her left big toe bent underneath her foot.  She did not hit her head or lose consciousness.  Reports weight-bearing as an aggravating factor.  She took 400 mg ibuprofen prior to arrival as well as applied ice without significant relief.  Reports difficulty bending her toes.  Denies numbness or tingling, fever, chills, shortness of breath, chest pain.    The history is provided by the patient and the spouse.     Review of patient's allergies indicates:   Allergen Reactions    Codeine      Other reaction(s): Nausea    Hydrocodone      Other reaction(s): Vomiting  Other reaction(s): Nausea    Promethazine      Other reaction(s): Vomiting  Other reaction(s): Nausea     Past Medical History:   Diagnosis Date    Acne     Dermatologist prescribes spironolactone     Anxiety     celexa 39    Basal cell carcinoma     Brachioradial pruritus 08/11/2022    Chronic constipation 05/24/2017    History of patellar fracture 07/17/2019    L, slipped at home    Hot flashes due to menopause 05/24/2017    Lumbar scoliosis     Osteoarthritis of spine with radiculopathy, cervical region 08/11/2022    CT 2014, pruritis    Other osteoporosis without current pathological fracture 05/24/2017    Reclast with Rheum, ankle & patella fracture, mom with dx    Primary osteoarthritis of left hip 05/24/2017    Dr Garay, dry needling at [facility] helping, MRI 2016, Injection didn't help much    Right elbow pain 07/20/2017    dry needling at [facility]  helping    Vitamin D deficiency 08/19/2019    Dr Blas started 50,000 q 7d in August 2019     Past Surgical History:   Procedure Laterality Date    COLONOSCOPY N/A 4/8/2019    Procedure: COLONOSCOPY;  Surgeon: Rakesh Parmar MD;  Location: [facility] ENDO (4TH FLR);  Service: Endoscopy;  Laterality: N/A; significantly excessive looping of the colon    COLONOSCOPY N/A 6/13/2019    Procedure: COLONOSCOPY - Device-assisted;  Surgeon: Sravan Haji MD;  Location: [facility] ENDO (2ND FLR);  Service: Endoscopy;  Laterality: N/A;  Single-balloon scope; unremarkable, repeat in 10 years for screening    right hip surgery      April 24, 2012     Family History   Problem Relation Age of Onset    Hypertension Mother     Hyperlipidemia Mother     Osteoporosis Mother     Breast cancer Mother 81    Skin cancer Father     Breast cancer Maternal Aunt 65    Melanoma Maternal Grandfather     Colon cancer Neg Hx     Colon polyps Neg Hx     Crohn's disease Neg Hx     Ulcerative colitis Neg Hx     Stomach cancer Neg Hx     Esophageal cancer Neg Hx      Social History     Tobacco Use    Smoking status: Never    Smokeless tobacco: Never   Substance Use Topics    Alcohol use: Yes     Alcohol/week: 8.0 standard drinks of alcohol     Types: 4 Standard drinks or equivalent, 4 Cans of beer per week    Drug use: No     Review of Systems  As per HPI above  Physical Exam     Initial Vitals [02/04/24 1733]   BP Pulse Resp Temp SpO2   131/72 88 18 98.2 °F (36.8 °C) 99 %      MAP       --         Physical Exam    Constitutional: She appears well-developed and well-nourished.  Non-toxic appearance. She does not appear ill. No distress.   HENT:   Head: Normocephalic and atraumatic.   Eyes: Conjunctivae are normal.   Neck: Neck supple.   Cardiovascular:  Normal rate and regular rhythm.           Pulmonary/Chest: Effort normal. No tachypnea. No respiratory distress.   Musculoskeletal:      Cervical back: Neck supple.      Right foot: Normal capillary refill.  No crepitus. Normal pulse.        Feet:       Comments: Swelling and mild ecchymosis with associated tenderness overlying the distal left great toe.  Full active and passive ROM of left ankle without pain, but does have point tenderness over talus with mild swelling. Good capillary refill. DP/PT pulses 2+. Sensation in tact.      Neurological: She is alert and oriented to person, place, and time.   Skin: Skin is warm, dry and intact.   Psychiatric: She has a normal mood and affect. Her speech is normal and behavior is normal.         ED Course   Procedures  Labs Reviewed - No data to display       Imaging Results              X-Ray Foot Complete Left (Final result)  Result time 02/04/24 18:41:05      Final result by Rickie Quintero MD (02/04/24 18:41:05)                   Impression:      1.  Acute nondisplaced intra-articular fracture of the base of the 1st distal phalanx.    2.  Amorphous calcification overlying the head of talus.  The findings may represent acute nondisplaced fracture.  Suggest correlation with point tenderness.      Electronically signed by: Rickie Quintero MD  Date:    02/04/2024  Time:    18:41               Narrative:    EXAMINATION:  XR ANKLE COMPLETE 3 VIEW LEFT; XR FOOT COMPLETE 3 VIEW LEFT    CLINICAL HISTORY:  Injury, unspecified, initial encounter    TECHNIQUE:  AP, lateral and oblique views of the left ankle and foot were performed.    COMPARISON:  None    FINDINGS:  The bone mineralization is within normal limits.  There is a region of amorphous calcification overlying the head of talus.  There is an acute nondisplaced intra-articular fracture of the base of the 1st distal phalanx.  There is no evidence of periostitis.    There is a plantar calcaneal spur present.  The joint spaces are maintained.  There is soft tissue swelling along the dorsum of the foot.  No radiopaque foreign body is identified.                                       X-Ray Ankle Complete Left (Final result)  Result time  02/04/24 18:41:05      Final result by Rickie Quintero MD (02/04/24 18:41:05)                   Impression:      1.  Acute nondisplaced intra-articular fracture of the base of the 1st distal phalanx.    2.  Amorphous calcification overlying the head of talus.  The findings may represent acute nondisplaced fracture.  Suggest correlation with point tenderness.      Electronically signed by: Rickie Quintero MD  Date:    02/04/2024  Time:    18:41               Narrative:    EXAMINATION:  XR ANKLE COMPLETE 3 VIEW LEFT; XR FOOT COMPLETE 3 VIEW LEFT    CLINICAL HISTORY:  Injury, unspecified, initial encounter    TECHNIQUE:  AP, lateral and oblique views of the left ankle and foot were performed.    COMPARISON:  None    FINDINGS:  The bone mineralization is within normal limits.  There is a region of amorphous calcification overlying the head of talus.  There is an acute nondisplaced intra-articular fracture of the base of the 1st distal phalanx.  There is no evidence of periostitis.    There is a plantar calcaneal spur present.  The joint spaces are maintained.  There is soft tissue swelling along the dorsum of the foot.  No radiopaque foreign body is identified.                                       Medications   ketorolac injection 15 mg (15 mg Intramuscular Given 2/4/24 1828)     Medical Decision Making  Urgent evaluation of an afebrile 58-year-old female with left foot pain after mechanical fall just prior to arrival.  Physical exam reveals swelling and ecchymosis with associated tenderness to palpation over left 1st MTP.  Patient with history of osteoporosis and multiple prior fractures because of such.  Will obtain imaging, apply ice, treat with Toradol, and reassess.    Differential diagnosis includes but is not limited to:  Muscle strain, ligament tear/sprain, soft tissue contusion, osteoarthritis, fracture, nerve injury, bursitis    Patient remains nontoxic appearing and neurovascularly intact.  X-ray shows acute  nondisplaced intra-articular fracture of the base of the 1st distal phalanx and acute nondisplaced fracture of head of talus. Plan for short walking boot and crutches for comfort at this time as well as NSAIDs and RICE protocol. Patient will benefit from follow-up with podiatry or sports medicine for further workup and management, referral placed.  Patient and her partner at bedside educated on signs and symptoms to monitor for and when to return to ED. Patient verbalized understanding agrees with treatment plan. All questions and concerns addressed.     Amount and/or Complexity of Data Reviewed  Radiology: ordered. Decision-making details documented in ED Course.    Risk  Prescription drug management.               ED Course as of 02/04/24 2038   Sun Feb 04, 2024 1858 X-Ray Foot Complete Left [POLA]   1858 X-Ray Foot Complete Left  1.  Acute nondisplaced intra-articular fracture of the base of the 1st distal phalanx.     2.  Amorphous calcification overlying the head of talus.  The findings may represent acute nondisplaced fracture.  Suggest correlation with point tenderness. [POLA]      ED Course User Index  [POLA] Marisa Mendoza PA-C                       Clinical Impression:  Final diagnoses:  [T14.90XA] Injury  [S92.155A] Closed nondisplaced avulsion fracture of left talus, initial encounter  [S92.415A] Nondisplaced fracture of proximal phalanx of left great toe, initial encounter for closed fracture (Primary)          ED Disposition Condition    Discharge Stable          ED Prescriptions       Medication Sig Dispense Start Date End Date Auth. Provider    meloxicam (MOBIC) 15 MG tablet Take 1 tablet (15 mg total) by mouth once daily. 30 tablet 2/4/2024 -- Marisa Mendoza PA-C          Follow-up Information       Follow up With Specialties Details Why Contact Info    [facility]ry Podiatry Schedule an appointment as soon as possible for a visit   [address] [place] [address] [place]. [address] [place] [address]  [address]  [phone]    [facility] - Orthopedics Orthopedics   [address]  [facility] [id number] [phone]    [facility] - Emergency Dept Emergency Medicine Go to  If symptoms worsen [address]  [facility] [address]  [phone]             Marisa Mendoza PA-C  02/04/24 2040

## 2024-02-05 NOTE — TELEPHONE ENCOUNTER
Staff informed patient there are no sooner appointments available for any podiatrist at this time. Staff stated patient could go to ED/ Urgent Care.      Patient stated already went to Ed and will keep appointment on 2/7 with Dr. Del Cid.    Staff verbalized understanding.      ----- Message from Unique Meredith sent at 2/5/2024  8:37 AM CST -----  Who Called: Patient    What is the request in detail: Requesting call back to discuss scheduling NP appointment from attached referral to be seen for the following:      Can the clinic reply by [facility]? NO    Best Call Back Number: [phone]    Additional Information:  Pt called, she has a broken foot. She has a scheduled appointment with your office on 2/7 @ 7:30, but is requesting to come in today (if possible).

## 2024-02-07 ENCOUNTER — OFFICE VISIT (OUTPATIENT)
Dept: PODIATRY | Facility: CLINIC | Age: 59
End: 2024-02-07
Payer: COMMERCIAL

## 2024-02-07 VITALS
SYSTOLIC BLOOD PRESSURE: 136 MMHG | BODY MASS INDEX: 26.98 KG/M2 | WEIGHT: 158 LBS | HEART RATE: 83 BPM | HEIGHT: 64 IN | DIASTOLIC BLOOD PRESSURE: 89 MMHG

## 2024-02-07 DIAGNOSIS — M79.672 FOOT PAIN, LEFT: Primary | ICD-10-CM

## 2024-02-07 DIAGNOSIS — S92.155A CLOSED NONDISPLACED AVULSION FRACTURE OF LEFT TALUS, INITIAL ENCOUNTER: ICD-10-CM

## 2024-02-07 DIAGNOSIS — S92.415A NONDISPLACED FRACTURE OF PROXIMAL PHALANX OF LEFT GREAT TOE, INITIAL ENCOUNTER FOR CLOSED FRACTURE: ICD-10-CM

## 2024-02-07 PROCEDURE — 3079F DIAST BP 80-89 MM HG: CPT | Mod: CPTII,S$GLB,, | Performed by: PODIATRIST

## 2024-02-07 PROCEDURE — 99203 OFFICE O/P NEW LOW 30 MIN: CPT | Mod: 57,S$GLB,, | Performed by: PODIATRIST

## 2024-02-07 PROCEDURE — 3075F SYST BP GE 130 - 139MM HG: CPT | Mod: CPTII,S$GLB,, | Performed by: PODIATRIST

## 2024-02-07 PROCEDURE — 28430 CLTX TALUS FRACTURE W/O MNPJ: CPT | Mod: LT,S$GLB,, | Performed by: PODIATRIST

## 2024-02-07 PROCEDURE — 99999 PR PBB SHADOW E&M-EST. PATIENT-LVL IV: CPT | Mod: PBBFAC,,, | Performed by: PODIATRIST

## 2024-02-07 PROCEDURE — 3008F BODY MASS INDEX DOCD: CPT | Mod: CPTII,S$GLB,, | Performed by: PODIATRIST

## 2024-02-07 PROCEDURE — 1159F MED LIST DOCD IN RCRD: CPT | Mod: CPTII,S$GLB,, | Performed by: PODIATRIST

## 2024-02-07 PROCEDURE — 28490 TREAT BIG TOE FRACTURE: CPT | Mod: 51,TA,S$GLB, | Performed by: PODIATRIST

## 2024-02-07 RX ORDER — LIDOCAINE AND PRILOCAINE 25; 25 MG/G; MG/G
CREAM TOPICAL
Qty: 30 G | Refills: 3 | Status: SHIPPED | OUTPATIENT
Start: 2024-02-07

## 2024-02-07 NOTE — PROGRESS NOTES
Subjective:      Patient ID: Grace Beltran is a 58 y.o. female.    Chief Complaint: Foot Pain (Broken foot)    Sharp deep pain left big toe and top of the foot just in front of the ankle.  Rapid onset during a fall 3 days ago.  Patient is ambulating in fracture boot with crutches for stability.  Relates no change since injury.  Taking meloxicam 1 tab 1 time daily for pain.  Denies repeat trauma and surgery left foot.    Review of Systems   Constitutional: Negative for chills, diaphoresis, fever, malaise/fatigue and night sweats.   Cardiovascular:  Negative for claudication, cyanosis, leg swelling and syncope.   Skin:  Negative for color change, dry skin, nail changes, rash, suspicious lesions and unusual hair distribution.   Musculoskeletal:  Positive for joint pain. Negative for falls, joint swelling, muscle cramps, muscle weakness and stiffness.   Gastrointestinal:  Negative for constipation, diarrhea, nausea and vomiting.   Neurological:  Negative for brief paralysis, disturbances in coordination, focal weakness, numbness, paresthesias, sensory change and tremors.         Objective:      Physical Exam  Constitutional:       General: She is not in acute distress.     Appearance: She is well-developed. She is not diaphoretic.   Cardiovascular:      Pulses:           Popliteal pulses are 2+ on the right side and 2+ on the left side.        Dorsalis pedis pulses are 2+ on the right side and 2+ on the left side.        Posterior tibial pulses are 2+ on the right side and 2+ on the left side.      Comments: Capillary refill 3 seconds all toes/distal feet, all toes/both feet warm to touch.      Negative lymphadenopathy bilateral popliteal fossa and tarsal tunnel.      Negavie lower extremity edema bilateral.    Musculoskeletal:      Right ankle: No swelling, deformity, ecchymosis or lacerations. Normal range of motion. Normal pulse.      Right Achilles Tendon: Normal. No defects. Constantino's test negative.       Comments:  Sharp deep pain to palpation of the   Superior lateral aspect of the talar head and neck left without deformity loss of function signs of acute trauma     Pain to palpation and range motion of the hallux IPJ left without deformity loss of function signs of acute trauma..   Lymphadenopathy:      Lower Body: No right inguinal adenopathy. No left inguinal adenopathy.      Comments: Negative lymphadenopathy bilateral popliteal fossa and tarsal tunnel.    Negative lymphangitic streaking bilateral feet/ankles/legs.   Skin:     General: Skin is warm and dry.      Capillary Refill: Capillary refill takes 2 to 3 seconds.      Coloration: Skin is not pale.      Findings: No abrasion, bruising, burn, ecchymosis, erythema, laceration, lesion or rash.      Nails: There is no clubbing.      Comments:     Mild ecchymosis of the left forefoot in the plantar aspect of the 1st toe.  Otherwise, Skin is normal age and health appropriate color, turgor, texture, and temperature bilateral lower extremities without ulceration, hyperpigmentation, discoloration, masses nodules or cords palpated.  No ecchymosis, erythema, edema, or cardinal signs of infection bilateral lower extremities.     Neurological:      Mental Status: She is alert and oriented to person, place, and time.      Sensory: No sensory deficit.      Motor: No tremor, atrophy or abnormal muscle tone.      Gait: Gait normal.      Comments:  Negative allodynia both feet   Psychiatric:         Behavior: Behavior is cooperative.           Assessment:       Encounter Diagnoses   Name Primary?    Closed nondisplaced avulsion fracture of left talus, initial encounter     Nondisplaced fracture of proximal phalanx of left great toe, initial encounter for closed fracture     Foot pain, left Yes         Plan:       Grace was seen today for foot pain.    Diagnoses and all orders for this visit:    Foot pain, left  -     X-Ray Foot Complete Left; Future    Closed  nondisplaced avulsion fracture of left talus, initial encounter  -     Ambulatory referral/consult to Podiatry  -     X-Ray Foot Complete Left; Future    Nondisplaced fracture of proximal phalanx of left great toe, initial encounter for closed fracture  -     Ambulatory referral/consult to Podiatry  -     X-Ray Foot Complete Left; Future    Other orders  -     LIDOcaine-prilocaine (EMLA) cream; Apply topically as needed.      I counseled the patient on her conditions, their implications and medical management.          Rest ice compression elevation.      Ace bandage left foot neutral position.      Continue ambulation to tolerance left in fracture boot with crutches for stability.    Repeat x-rays 1 month, sooner p.r.n..      Topical EMLA cream once nightly for pain          Follow up in about 1 month (around 3/7/2024).

## 2024-02-20 ENCOUNTER — OFFICE VISIT (OUTPATIENT)
Dept: PRIMARY CARE CLINIC | Facility: CLINIC | Age: 59
End: 2024-02-20
Payer: COMMERCIAL

## 2024-02-20 VITALS
HEIGHT: 64 IN | TEMPERATURE: 98 F | WEIGHT: 160.5 LBS | HEART RATE: 89 BPM | OXYGEN SATURATION: 97 % | BODY MASS INDEX: 27.4 KG/M2 | DIASTOLIC BLOOD PRESSURE: 70 MMHG | SYSTOLIC BLOOD PRESSURE: 108 MMHG

## 2024-02-20 DIAGNOSIS — F41.9 ANXIETY: ICD-10-CM

## 2024-02-20 DIAGNOSIS — Z00.00 ROUTINE GENERAL MEDICAL EXAMINATION AT A HEALTH CARE FACILITY: Primary | ICD-10-CM

## 2024-02-20 DIAGNOSIS — S82.034D CLOSED NONDISPLACED TRANSVERSE FRACTURE OF RIGHT PATELLA WITH ROUTINE HEALING: ICD-10-CM

## 2024-02-20 DIAGNOSIS — M94.0 COSTOCHONDRITIS: ICD-10-CM

## 2024-02-20 PROCEDURE — 1160F RVW MEDS BY RX/DR IN RCRD: CPT | Mod: CPTII,S$GLB,, | Performed by: FAMILY MEDICINE

## 2024-02-20 PROCEDURE — 3074F SYST BP LT 130 MM HG: CPT | Mod: CPTII,S$GLB,, | Performed by: FAMILY MEDICINE

## 2024-02-20 PROCEDURE — 3008F BODY MASS INDEX DOCD: CPT | Mod: CPTII,S$GLB,, | Performed by: FAMILY MEDICINE

## 2024-02-20 PROCEDURE — 99396 PREV VISIT EST AGE 40-64: CPT | Mod: S$GLB,,, | Performed by: FAMILY MEDICINE

## 2024-02-20 PROCEDURE — 3078F DIAST BP <80 MM HG: CPT | Mod: CPTII,S$GLB,, | Performed by: FAMILY MEDICINE

## 2024-02-20 PROCEDURE — 99999 PR PBB SHADOW E&M-EST. PATIENT-LVL IV: CPT | Mod: PBBFAC,,, | Performed by: FAMILY MEDICINE

## 2024-02-20 PROCEDURE — 1159F MED LIST DOCD IN RCRD: CPT | Mod: CPTII,S$GLB,, | Performed by: FAMILY MEDICINE

## 2024-02-20 NOTE — ASSESSMENT & PLAN NOTE
L lower nipple line rib joint tender where cartilage meets bone , no deformity  No need xray, let me know if persists or worsens

## 2024-02-20 NOTE — PROGRESS NOTES
Assessment:     1. Routine general medical examination at a health care facility    2. Costochondritis    3. Anxiety    4. Closed nondisplaced transverse fracture of right patella with routine healing      Plan:     Routine general medical examination at a health care facility  Follow with GYN for female health & cancer prevention  Move more, High fiber, good fat (avocado, olive oil, nuts) foods  Eat more food grown from the earth (picked from trees or out of the ground)  Colon cancer screening at 44 yo  Follow up yearly with LABS ONE WEEK PRIOR so we can discuss at your visit      Costochondritis  L lower nipple line rib joint tender where cartilage meets bone , no deformity  No need xray, let me know if persists or worsens    Anxiety  Stable, continue citalopram 40 x 1    Closed nondisplaced transverse fracture of right patella with routine healing  Stable w boot, follow w Podiatrist          CHIEF COMPLAINT: General exam    HPI: Grace Beltran is a 58 y.o. female with is here today for general exam.     2/4 tripped on beed nondisp fx proximal phalanx great toe. In boot    Then coughing fit a week later, pain persistent in L rib under breast when I twist or press on it, turning over in bed    Denies chest pain, shortness of breath    Current Outpatient Medications   Medication Instructions    celecoxib (CELEBREX) 100 mg, Oral, Nightly PRN    citalopram (CELEXA) 40 MG tablet TAKE 1 TABLET BY MOUTH EVERY DAY    clindamycin-benzoyl peroxide gel Aaa qam to face prn flare    LIDOcaine-prilocaine (EMLA) cream Topical (Top), As needed (PRN)    spironolactone (ALDACTONE) 50 MG tablet TAKE 1 TABLET(50 MG) BY MOUTH EVERY MORNING    sulfacetamide sodium-sulfur (SULFACLEANSE 8-4) 8-4 % Susp Wash face qhs    tretinoin (RETIN-A) 0.025 % cream Compound tretinoin 0.025% / niacinamide 2% cream. Apply a pea-sized amount to entire face qhs then moisturize.       Lab Results   Component Value Date    HGBA1C 5.4 11/17/2023  "    No results found for: "MICALBCREAT"  Lab Results   Component Value Date    LDLCALC 141.8 11/17/2023    LDLCALC 131.4 02/15/2022    CHOL 234 (H) 11/17/2023    HDL 73 11/17/2023    TRIG 96 11/17/2023       Lab Results   Component Value Date     11/17/2023    K 4.4 11/17/2023     11/17/2023    CO2 30 (H) 11/17/2023    GLU 82 11/17/2023    BUN 13 11/17/2023    CREATININE 0.8 11/17/2023    CALCIUM 9.2 11/17/2023    PROT 6.9 11/17/2023    ALBUMIN 3.7 11/17/2023    BILITOT 0.4 11/17/2023    ALKPHOS 81 11/17/2023    AST 20 11/17/2023    ALT 24 11/17/2023    ANIONGAP 9 11/17/2023    ESTGFRAFRICA >60.0 02/15/2022    EGFRNONAA >60.0 02/15/2022    WBC 4.22 11/17/2023    HGB 13.6 11/17/2023    HGB 14.2 02/15/2022    HCT 41.8 11/17/2023    MCV 92 11/17/2023     11/17/2023    TSH 1.373 11/17/2023    HEPCAB Negative 05/18/2017       Lab Results   Component Value Date    FSH 6.2 01/12/2004    PROGESTERONE 16.3 (H) 02/25/2004    ESTRADIOL 110 01/12/2004    SLXPFRIU91WI 23 (L) 01/20/2023    AXUVKEXE67RD 41 03/03/2022         Past Medical History:   Diagnosis Date    Acne     Dermatologist prescribes spironolactone     Anxiety     celexa 40    Basal cell carcinoma     Brachioradial pruritus 08/11/2022    Chronic constipation 05/24/2017    History of patellar fracture 07/17/2019    L, slipped at home    Hot flashes due to menopause 05/24/2017    Lumbar scoliosis     Osteoarthritis of spine with radiculopathy, cervical region 08/11/2022    CT 2014, pruritis    Other osteoporosis without current pathological fracture 05/24/2017    Reclast with Rheum, ankle & patella fracture, mom with dx    Primary osteoarthritis of left hip 05/24/2017    Dr Garay, dry needling at [facility] helping, MRI 2016, Injection didn't help much    Right elbow pain 07/20/2017    dry needling at [facility] helping    Vitamin D deficiency 08/19/2019    Dr Blas started 50,000 q 7d in August 2019     Past Surgical History:   Procedure Laterality " "Date    COLONOSCOPY N/A 4/8/2019    Procedure: COLONOSCOPY;  Surgeon: Rakesh Parmar MD;  Location: [facility] ENDO (4TH FLR);  Service: Endoscopy;  Laterality: N/A; significantly excessive looping of the colon    COLONOSCOPY N/A 6/13/2019    Procedure: COLONOSCOPY - Device-assisted;  Surgeon: Sravan Haji MD;  Location: [facility] ENDO (2ND FLR);  Service: Endoscopy;  Laterality: N/A;  Single-balloon scope; unremarkable, repeat in 10 years for screening    right hip surgery      April 24, 2012     Vitals:    02/20/24 1212   BP: 108/70   Pulse: 89   Temp: 98.3 °F (36.8 °C)   TempSrc: Oral   SpO2: 97%   Weight: 72.8 kg (160 lb 7.9 oz)   Height: 5' 4" (1.626 m)   PainSc:   6   PainLoc: Rib Cage     Wt Readings from Last 5 Encounters:   02/20/24 72.8 kg (160 lb 7.9 oz)   02/07/24 71.7 kg (158 lb)   02/04/24 71.7 kg (158 lb)   11/20/23 67 kg (147 lb 11.3 oz)   11/07/23 71.8 kg (158 lb 4.6 oz)     Objective:   Physical Exam  Constitutional:       Appearance: She is well-developed.   Eyes:      Pupils: Pupils are equal, round, and reactive to light.   Cardiovascular:      Rate and Rhythm: Normal rate and regular rhythm.      Heart sounds: Normal heart sounds. No murmur heard.  Pulmonary:      Effort: Pulmonary effort is normal.      Breath sounds: Normal breath sounds. No wheezing.      Comments: Tender L lower anterior rib nipple line, no deformity, no skin change, no pain w deep inspiration, no pop or click  Abdominal:      General: Bowel sounds are normal. There is no distension.      Palpations: Abdomen is soft. There is no mass.      Tenderness: There is no abdominal tenderness. There is no guarding or rebound.   Musculoskeletal:      Cervical back: Neck supple.      Comments: L foot walking boot   Skin:     General: Skin is warm and dry.   Neurological:      Mental Status: She is alert.   Psychiatric:         Behavior: Behavior normal.                                     "

## 2024-03-06 ENCOUNTER — APPOINTMENT (OUTPATIENT)
Dept: RADIOLOGY | Facility: OTHER | Age: 59
End: 2024-03-06
Attending: PODIATRIST
Payer: COMMERCIAL

## 2024-03-06 DIAGNOSIS — M79.672 FOOT PAIN, LEFT: ICD-10-CM

## 2024-03-06 DIAGNOSIS — S92.155A CLOSED NONDISPLACED AVULSION FRACTURE OF LEFT TALUS, INITIAL ENCOUNTER: ICD-10-CM

## 2024-03-06 DIAGNOSIS — S92.415A NONDISPLACED FRACTURE OF PROXIMAL PHALANX OF LEFT GREAT TOE, INITIAL ENCOUNTER FOR CLOSED FRACTURE: ICD-10-CM

## 2024-03-06 PROCEDURE — 73630 X-RAY EXAM OF FOOT: CPT | Mod: TC,PN,LT

## 2024-03-06 PROCEDURE — 73630 X-RAY EXAM OF FOOT: CPT | Mod: 26,LT,, | Performed by: RADIOLOGY

## 2024-03-08 ENCOUNTER — OFFICE VISIT (OUTPATIENT)
Dept: PODIATRY | Facility: CLINIC | Age: 59
End: 2024-03-08
Payer: COMMERCIAL

## 2024-03-08 VITALS
SYSTOLIC BLOOD PRESSURE: 119 MMHG | BODY MASS INDEX: 27.4 KG/M2 | WEIGHT: 160.5 LBS | DIASTOLIC BLOOD PRESSURE: 78 MMHG | HEART RATE: 92 BPM | HEIGHT: 64 IN

## 2024-03-08 DIAGNOSIS — S92.155A CLOSED NONDISPLACED AVULSION FRACTURE OF LEFT TALUS, INITIAL ENCOUNTER: Primary | ICD-10-CM

## 2024-03-08 DIAGNOSIS — M79.672 FOOT PAIN, LEFT: ICD-10-CM

## 2024-03-08 DIAGNOSIS — S92.415A NONDISPLACED FRACTURE OF PROXIMAL PHALANX OF LEFT GREAT TOE, INITIAL ENCOUNTER FOR CLOSED FRACTURE: ICD-10-CM

## 2024-03-08 PROCEDURE — 3078F DIAST BP <80 MM HG: CPT | Mod: CPTII,S$GLB,, | Performed by: PODIATRIST

## 2024-03-08 PROCEDURE — 3074F SYST BP LT 130 MM HG: CPT | Mod: CPTII,S$GLB,, | Performed by: PODIATRIST

## 2024-03-08 PROCEDURE — 99024 POSTOP FOLLOW-UP VISIT: CPT | Mod: S$GLB,,, | Performed by: PODIATRIST

## 2024-03-08 PROCEDURE — 99999 PR PBB SHADOW E&M-EST. PATIENT-LVL III: CPT | Mod: PBBFAC,,, | Performed by: PODIATRIST

## 2024-03-08 PROCEDURE — 1159F MED LIST DOCD IN RCRD: CPT | Mod: CPTII,S$GLB,, | Performed by: PODIATRIST

## 2024-03-08 NOTE — PROGRESS NOTES
Subjective:      Patient ID: Grace Beltran is a 58 y.o. female.    Chief Complaint: Follow-up (Toe injury)    Sharp deep pain left big toe and top of the foot just in front of the ankle.  Rapid onset during a fall.  Patient is ambulating in surgical shoe without gait assist.  Relates improvement with protected weight-bearing in time  Taking meloxicam 1 tab 1 time daily for pain.  Denies repeat trauma and surgery left foot.  X-rays 03/07/2024 show left great toe distal phalanx fracture with progressive healing without displacement or any signs of worsening.  I failed to appreciate the talar operation on the new films.    Review of Systems   Constitutional: Negative for chills, diaphoresis, fever, malaise/fatigue and night sweats.   Cardiovascular:  Negative for claudication, cyanosis, leg swelling and syncope.   Skin:  Negative for color change, dry skin, nail changes, rash, suspicious lesions and unusual hair distribution.   Musculoskeletal:  Positive for joint pain. Negative for falls, joint swelling, muscle cramps, muscle weakness and stiffness.   Gastrointestinal:  Negative for constipation, diarrhea, nausea and vomiting.   Neurological:  Negative for brief paralysis, disturbances in coordination, focal weakness, numbness, paresthesias, sensory change and tremors.           Objective:      Physical Exam  Constitutional:       General: She is not in acute distress.     Appearance: She is well-developed. She is not diaphoretic.   Cardiovascular:      Pulses:           Popliteal pulses are 2+ on the right side and 2+ on the left side.        Dorsalis pedis pulses are 2+ on the right side and 2+ on the left side.        Posterior tibial pulses are 2+ on the right side and 2+ on the left side.      Comments: Capillary refill 3 seconds all toes/distal feet, all toes/both feet warm to touch.      Negative lymphadenopathy bilateral popliteal fossa and tarsal tunnel.      Negavie lower extremity edema  bilateral.    Musculoskeletal:      Right ankle: No swelling, deformity, ecchymosis or lacerations. Normal range of motion. Normal pulse.      Right Achilles Tendon: Normal. No defects. Constantino's test negative.      Comments:  No current pain to palpation of the   Superior lateral aspect of the talar head and neck left without deformity loss of function signs of acute trauma     More residual Pain to palpation and range motion of the hallux IPJ left without deformity loss of function signs of acute trauma..   Lymphadenopathy:      Lower Body: No right inguinal adenopathy. No left inguinal adenopathy.      Comments: Negative lymphadenopathy bilateral popliteal fossa and tarsal tunnel.    Negative lymphangitic streaking bilateral feet/ankles/legs.   Skin:     General: Skin is warm and dry.      Capillary Refill: Capillary refill takes 2 to 3 seconds.      Coloration: Skin is not pale.      Findings: No abrasion, bruising, burn, ecchymosis, erythema, laceration, lesion or rash.      Nails: There is no clubbing.      Comments:    Minor bruising remains in the central portion of the plantar arch of the left foot without tenderness to palpation without open skin or signs of infection.     Neurological:      Mental Status: She is alert and oriented to person, place, and time.      Sensory: No sensory deficit.      Motor: No tremor, atrophy or abnormal muscle tone.      Gait: Gait normal.      Comments:  Negative allodynia both feet   Psychiatric:         Behavior: Behavior is cooperative.             Assessment:       Encounter Diagnoses   Name Primary?    Closed nondisplaced avulsion fracture of left talus, initial encounter Yes    Nondisplaced fracture of proximal phalanx of left great toe, initial encounter for closed fracture     Foot pain, left          Plan:       Grace was seen today for follow-up.    Diagnoses and all orders for this visit:    Closed nondisplaced avulsion fracture of left talus, initial  encounter  -     Ambulatory referral/consult to Physical/Occupational Therapy; Future    Nondisplaced fracture of proximal phalanx of left great toe, initial encounter for closed fracture  -     Ambulatory referral/consult to Physical/Occupational Therapy; Future    Foot pain, left  -     Ambulatory referral/consult to Physical/Occupational Therapy; Future      I counseled the patient on her conditions, their implications and medical management.        Recommend over-the-counter compression socks to facilitate edema management as she returns to gait in normal shoes and inserts.      Activity to tolerance and shoes of choice.      Physical therapy to help hasten returned to normal function.      Topical EMLA cream once nightly for pain          Follow up if symptoms worsen or fail to improve.

## 2024-03-15 ENCOUNTER — CLINICAL SUPPORT (OUTPATIENT)
Dept: REHABILITATION | Facility: HOSPITAL | Age: 59
End: 2024-03-15
Attending: PODIATRIST
Payer: COMMERCIAL

## 2024-03-15 DIAGNOSIS — S92.415A NONDISPLACED FRACTURE OF PROXIMAL PHALANX OF LEFT GREAT TOE, INITIAL ENCOUNTER FOR CLOSED FRACTURE: ICD-10-CM

## 2024-03-15 DIAGNOSIS — S92.155A CLOSED NONDISPLACED AVULSION FRACTURE OF LEFT TALUS, INITIAL ENCOUNTER: ICD-10-CM

## 2024-03-15 DIAGNOSIS — R52 PAIN: ICD-10-CM

## 2024-03-15 DIAGNOSIS — M25.672 DECREASED RANGE OF MOTION OF LEFT ANKLE: Primary | ICD-10-CM

## 2024-03-15 DIAGNOSIS — M79.672 FOOT PAIN, LEFT: ICD-10-CM

## 2024-03-15 DIAGNOSIS — R53.1 DECREASED STRENGTH: ICD-10-CM

## 2024-03-15 PROCEDURE — 97161 PT EVAL LOW COMPLEX 20 MIN: CPT

## 2024-03-15 PROCEDURE — 97110 THERAPEUTIC EXERCISES: CPT

## 2024-03-15 NOTE — PLAN OF CARE
[facility]   Physical Therapy Initial Evaluation      Name: Grace Beltran  Clinic Number: [id number]    Therapy Diagnosis:   Encounter Diagnoses   Name Primary?    Closed nondisplaced avulsion fracture of left talus, initial encounter     Nondisplaced fracture of proximal phalanx of left great toe, initial encounter for closed fracture     Foot pain, left     Decreased range of motion of left ankle Yes    Decreased strength     Pain         Physician: Vic Del Cid DPM    Physician Orders: PT Eval and Treat   Medical Diagnosis from Referral:   S92.155A (ICD-10-CM) - Closed nondisplaced avulsion fracture of left talus, initial encounter   S92.415A (ICD-10-CM) - Nondisplaced fracture of proximal phalanx of left great toe, initial encounter for closed fracture   M79.672 (ICD-10-CM) - Foot pain, left     Evaluation Date: 3/15/2024  Authorization Period Expiration: 3/8/25  Plan of Care Expiration: 4/26/24  Progress Note Due: 4/15/24  Visit # / Visits authorized: 1/ 1   FOTO: 1/ 3    Precautions: Standard     Time In: 9:20 am   Time Out: 9:51 am   Total Billable Time: 31 minutes    Subjective     Date of onset: 2/4/24    History of current condition - Grace reports: that she slipped on Gurmeet Gras beads on 2/4/24 and twisted (L) ankle/foot really bad. Pt stated that she stopped wearing boot a little over a week ago. Pt stated that after about 2-3 weeks she was not continuously wearing boot. Pt stated that she very briefly used crutches for the first week. Pt stated that podiatrist told her she could do activity as tolerated. Pt stated initially (L) ankle/foot was really swollen, but if she wears compression socks swelling is better. Pt stated that bruising is much better over past 5 days.  Pt stated that she has bad hips and needs hip surgeries.     Falls: see subjective     Imaging: see imaging section     Prior Therapy: yes  Social History: Pt stated that she lives with family. Pt  stated that she has one step to enter her H.   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   Prior Level of Function: independent, Pilates   Current Level of Function: report of pain/difficulty performing aggravating factors listed below    Pain:  Current 1/10, worst 9/10, best 0/10   Location: (L) ankle and (L) 1st toe, dorsal aspect of (L) foot   Description: aching and sharp   Aggravating Factors: pressing down on toe, (L) ankle PF, walking (can walk about 3 blocks and then starts to ache), feels like stepping on something under (L) 1st toe when standing   Easing Factors: elevation, compression socks, not moving around/doing anything    Patients goals: get back to doing Pilates, walking, having a normal gait      Medical History:   Past Medical History:   Diagnosis Date    Acne     Dermatologist prescribes spironolactone     Anxiety     celexa 40    Basal cell carcinoma     Brachioradial pruritus 08/11/2022    Chronic constipation 05/24/2017    History of patellar fracture 07/17/2019    L, slipped at home    Hot flashes due to menopause 05/24/2017    Lumbar scoliosis     Osteoarthritis of spine with radiculopathy, cervical region 08/11/2022    CT 2014, pruritis    Other osteoporosis without current pathological fracture 05/24/2017    Reclast with Rheum, ankle & patella fracture, mom with dx    Primary osteoarthritis of left hip 05/24/2017    Dr Garay, dry needling at [facility] helping, MRI 2016, Injection didn't help much    Right elbow pain 07/20/2017    dry needling at [facility] helping    Vitamin D deficiency 08/19/2019    Dr Blas started 50,000 q 7d in August 2019       Surgical History:   Grace Beltran  has a past surgical history that includes right hip surgery; Colonoscopy (N/A, 4/8/2019); and Colonoscopy (N/A, 6/13/2019).    Medications:   Grace has a current medication list which includes the following prescription(s): celecoxib,  "citalopram, clindamycin-benzoyl peroxide, lidocaine-prilocaine, spironolactone, sulfacetamide sodium-sulfur, and tretinoin.    Allergies:   Review of patient's allergies indicates:   Allergen Reactions    Codeine      Other reaction(s): Nausea    Hydrocodone      Other reaction(s): Vomiting  Other reaction(s): Nausea    Promethazine      Other reaction(s): Vomiting  Other reaction(s): Nausea        Objective        Hip Right  Left  Pain/Dysfunction with Movement    AROM MMT AROM MMT    Flexion WFL 5/5 WFL 5/5    Extension WFL 4/5 WFL 4/5    Abduction WFL 4/5 WFL 4/5    External rotation WFL 4+/5 WFL 4+/5       Knee Right  Left  Pain/Dysfunction with Movement    AROM MMT AROM MMT    Flexion WFL 5/5 WFL 5/5    Extension WFL 5/5 WFL 5/5      Ankle Right  Left  Pain/Dysfunction with Movement    AROM MMT AROM MMT != pain  NT - not tested    Plantarflexion 60 NT 45! NT (L) AROM: pain in 1st toe   Dorsiflexion 5 5/5 2 lacking 5/5    Inversion 35 5/5 20! 4+/5 (L) AROM: pain in lateral (L) ankle   Eversion 25 5/5 10 4+/5      Gait: pt ambulated without AD    SLS Test: (R) = 30 seconds (test stopped at 30 seconds)  , (L) = 5 seconds - decreased stability noted    Intake Outcome Measure for FOTO Foot Survey    Therapist reviewed FOTO scores for Grace Beltran on 3/15/2024.   FOTO report - see Media section or FOTO account episode details.    Intake Score: 52         Treatment     Total Treatment time (time-based codes) separate from Evaluation: 9 minutes     Grace received the treatments listed below:      therapeutic exercises to develop ROM and flexibility for 9 minutes including:  Gastroc stretch w/strap 3x30"   Soleus stretch w/strap 3x30"   Ankle ABC 2x  Ankle circles CCW x20       Patient Education and Home Exercises     Education provided:   - Role of PT - pt verbalized understanding  - HEP - pt was instructed to stop performing particular therex if increases pain - pt verbalized understanding    Written Home " Exercises Provided: yes. Exercises were reviewed and Grace was able to demonstrate them prior to the end of the session.  Grace demonstrated good  understanding of the education provided. See EMR under Patient Instructions for exercises provided during therapy sessions.    Assessment     Grace is a 58 y.o. female referred to outpatient Physical Therapy with a medical diagnosis of Closed nondisplaced avulsion fracture of left talus, initial encounter, Nondisplaced fracture of proximal phalanx of left great toe, initial encounter for closed fracture and Foot pain, left. Patient presents with decreased hip strength, decreased (L) ankle AROM, decreased (L) ankle strength, decreased (L) SLS stability and impaired gait. Pt will benefit from skilled PT.     Patient prognosis is Good.   Patient will benefit from skilled outpatient Physical Therapy to address the deficits stated above and in the chart below, provide patient /family education, and to maximize patientt's level of independence.     Plan of care discussed with patient: Yes  Patient's spiritual, cultural and educational needs considered and patient is agreeable to the plan of care and goals as stated below:     Anticipated Barriers for therapy: none    Medical Necessity is demonstrated by the following  History  Co-morbidities and personal factors that may impact the plan of care [x] LOW: no personal factors / co-morbidities  [] MODERATE: 1-2 personal factors / co-morbidities  [] HIGH: 3+ personal factors / co-morbidities    Moderate / High Support Documentation:   Co-morbidities affecting plan of care: standard    Personal Factors:   no deficits     Examination  Body Structures and Functions, activity limitations and participation restrictions that may impact the plan of care [] LOW: addressing 1-2 elements  [] MODERATE: 3+ elements  [] HIGH: 4+ elements (please support below)    Moderate / High Support Documentation: ROM, strength, stability, gait      Clinical Presentation [x] LOW: stable  [] MODERATE: Evolving  [] HIGH: Unstable     Decision Making/ Complexity Score: low       Goals:  Short Term Goals: 2 weeks   Pt will be compliant with HEP to supplement PT with decreasing pain and improving functional mobility    Long Term Goals: 6 weeks   Pt will improve FOTO score to 70 in order to demo improved functional mobility   Pt will demo (B) DF AROM to at least 10 degrees in order to improve functional gait  Pt will demo (L) ankle AROM =  (R) ankle AROM in all planes in order to improve functional mobility  Pt will improve LE MMT scores by at least 1/2 grade where deficits noted in order to improve strength for functional tasks  Pt will perform 25 SL heel raises on (B) LE to demo good (B) PF strength  Pt will perform (L) SLS for 30 seconds in order to demo improved (L) SL stability  Pt will report ability to perform ADLs without pain in (L) foot/toes/ankle in order to promote return to PLOF     Plan     Plan of care Certification: 3/15/2024 to 4/26/24.    Outpatient Physical Therapy 2 times weekly for 6 weeks to include the following interventions: Gait Training, Manual Therapy, Moist Heat/ Ice, Neuromuscular Re-ed, Patient Education, Self Care, Therapeutic Activities, Therapeutic Exercise, and IASTM, dry needling, and modalities prn .     Flory Camacho, PT        Physician's Signature: _________________________________________ Date: ________________

## 2024-03-20 ENCOUNTER — CLINICAL SUPPORT (OUTPATIENT)
Dept: REHABILITATION | Facility: HOSPITAL | Age: 59
End: 2024-03-20
Payer: COMMERCIAL

## 2024-03-20 DIAGNOSIS — R52 PAIN: ICD-10-CM

## 2024-03-20 DIAGNOSIS — M25.672 DECREASED RANGE OF MOTION OF LEFT ANKLE: Primary | ICD-10-CM

## 2024-03-20 DIAGNOSIS — R53.1 DECREASED STRENGTH: ICD-10-CM

## 2024-03-20 PROCEDURE — 97110 THERAPEUTIC EXERCISES: CPT | Mod: CQ

## 2024-03-20 PROCEDURE — 97112 NEUROMUSCULAR REEDUCATION: CPT | Mod: CQ

## 2024-03-20 NOTE — PROGRESS NOTES
"[facility]   Physical Therapy Treatment Note      Name: Grace Tuttleell  Clinic Number: [id number]    Therapy Diagnosis:   Encounter Diagnoses   Name Primary?    Decreased range of motion of left ankle Yes    Decreased strength     Pain      Physician: Vic Del Cid DPM    Visit Date: 3/20/2024    Physician Orders: PT Eval and Treat   Medical Diagnosis from Referral:   S92.155A (ICD-10-CM) - Closed nondisplaced avulsion fracture of left talus, initial encounter   S92.415A (ICD-10-CM) - Nondisplaced fracture of proximal phalanx of left great toe, initial encounter for closed fracture   M79.672 (ICD-10-CM) - Foot pain, left      Evaluation Date: 3/15/2024  Authorization Period Expiration: 12/31/2024  Plan of Care Expiration: 4/26/24  Progress Note Due: 4/15/24  Visit # / Visits authorized: 1/ 1, 1/20  FOTO: 1/ 3     Precautions: Standard      PTA Visit #: 1/5     Time In: 4:47pm  Time Out: 5:22 pm  Total Billable Time: 35 minutes    Subjective     Pt reports: not having any pain in her ankle, just a little bit in her big toe, but is having some pain in her hip.  She was compliant with home exercise program.  Response to previous treatment:   Functional change: none    Pain: 0/10  Location: left ankle    Objective      Objective Measures updated at progress report unless specified.     Treatment     Grace received the treatments listed below:      therapeutic exercises to develop ROM and flexibility for 25 minutes including:  Bike 5 min  Gastroc stretch w/strap 3x30"   Soleus stretch w/strap 3x30"   Ankle ABC 2x  Ankle circles CCW x20   Fitterboard DF/PF, INV/EV 2x10 each  Standing heel raises 2x10  Standing hip abd/ext  Clams B 2x10      neuromuscular re-education activities to improve: Balance, Coordination, and Proprioception for 10 minutes. The following activities were included:  Standing balance on airex tandem 3x30"  Standing balance on airex rhomberg 2x30" EO, 1x30" EC  Cone " taps on airex 2x10 B  Step ups on double airex 2x10 B        therapeutic activities to improve functional performance for 0  minutes, including:      Patient Education and Home Exercises       Education provided:   - HEP    Written Home Exercises Provided: yes. Exercises were reviewed and Grace was able to demonstrate them prior to the end of the session.  Grace demonstrated good  understanding of the education provided. See EMR under Patient Instructions for exercises provided during therapy sessions    Assessment     Continued with previously established exercises, added new exercises for hip and ankle strength, as well as activities for balance and proprioception (see above). Pt required SBA with occasional instances of CGA for slight wavering, however no LOB noted during balance activities. Pt tolerated all new activities well with no adverse reactions. Will continue to progress pt toward her goals.    Grace Is progressing well towards her goals.   Pt prognosis is Good.     Pt will continue to benefit from skilled outpatient physical therapy to address the deficits listed in the problem list box on initial evaluation, provide pt/family education and to maximize pt's level of independence in the home and community environment.     Pt's spiritual, cultural and educational needs considered and pt agreeable to plan of care and goals.     Anticipated barriers to physical therapy: none    Goals:   Short Term Goals: 2 weeks   Pt will be compliant with HEP to supplement PT with decreasing pain and improving functional mobility     Long Term Goals: 6 weeks   Pt will improve FOTO score to 70 in order to demo improved functional mobility   Pt will demo (B) DF AROM to at least 10 degrees in order to improve functional gait  Pt will demo (L) ankle AROM =  (R) ankle AROM in all planes in order to improve functional mobility  Pt will improve LE MMT scores by at least 1/2 grade where deficits noted in order to improve  strength for functional tasks  Pt will perform 25 SL heel raises on (B) LE to demo good (B) PF strength  Pt will perform (L) SLS for 30 seconds in order to demo improved (L) SL stability  Pt will report ability to perform ADLs without pain in (L) foot/toes/ankle in order to promote return to PLOF     Plan     Plan of care Certification: 3/15/2024 to 4/26/24.    PT/PTA met face to face to discuss pt's treatment plan and progress towards established goals. Pt will be seen by a physical therapist minimally every 6th visit or every 30 days.    I certify that I was present in the room directing the student in service delivery and guiding them using my skilled judgment. As the co-signing therapist I have reviewed the students documentation and am responsible for the treatment, assessment, and plan.   GIBSON Goncalves, PTA

## 2024-03-31 NOTE — TELEPHONE ENCOUNTER
No care due was identified.  [facility] Embedded Care Due Messages. Reference number: [id number].   3/31/2024 3:22:25 AM CDT

## 2024-04-01 RX ORDER — CITALOPRAM 40 MG/1
TABLET, FILM COATED ORAL
Qty: 90 TABLET | Refills: 3 | Status: SHIPPED | OUTPATIENT
Start: 2024-04-01

## 2024-04-01 NOTE — TELEPHONE ENCOUNTER
Grace Beltran  is requesting a refill authorization.  Brief Assessment and Rationale for Refill:  Approve     Medication Therapy Plan:         Comments:     Note composed:5:42 AM 04/01/2024

## 2024-04-02 ENCOUNTER — HOSPITAL ENCOUNTER (OUTPATIENT)
Dept: RADIOLOGY | Facility: HOSPITAL | Age: 59
Discharge: HOME | End: 2024-04-02
Attending: FAMILY MEDICINE
Payer: COMMERCIAL

## 2024-04-02 VITALS — HEIGHT: 64 IN | WEIGHT: 160 LBS | BODY MASS INDEX: 27.31 KG/M2

## 2024-04-02 DIAGNOSIS — Z12.31 ENCOUNTER FOR SCREENING MAMMOGRAM FOR BREAST CANCER: ICD-10-CM

## 2024-04-02 PROCEDURE — 77067 SCR MAMMO BI INCL CAD: CPT | Mod: TC

## 2024-04-02 PROCEDURE — 77063 BREAST TOMOSYNTHESIS BI: CPT | Mod: 26,,, | Performed by: RADIOLOGY

## 2024-04-02 PROCEDURE — 77067 SCR MAMMO BI INCL CAD: CPT | Mod: 26,,, | Performed by: RADIOLOGY

## 2024-04-03 ENCOUNTER — CLINICAL SUPPORT (OUTPATIENT)
Dept: REHABILITATION | Facility: HOSPITAL | Age: 59
End: 2024-04-03
Payer: COMMERCIAL

## 2024-04-03 DIAGNOSIS — R52 PAIN: ICD-10-CM

## 2024-04-03 DIAGNOSIS — R53.1 DECREASED STRENGTH: ICD-10-CM

## 2024-04-03 DIAGNOSIS — M25.672 DECREASED RANGE OF MOTION OF LEFT ANKLE: Primary | ICD-10-CM

## 2024-04-03 PROCEDURE — 97110 THERAPEUTIC EXERCISES: CPT

## 2024-04-03 PROCEDURE — 97112 NEUROMUSCULAR REEDUCATION: CPT

## 2024-04-03 NOTE — PROGRESS NOTES
"  Physical Therapy Daily Treatment Note     Name: Grace Beltran  Clinic Number: [id number]    Therapy Diagnosis:   Encounter Diagnoses   Name Primary?    Decreased range of motion of left ankle Yes    Decreased strength     Pain      Physician: Vic Del Cid DPM    Visit Date: 4/3/2024    Physician Orders: PT Eval and Treat   Medical Diagnosis from Referral:   S92.155A (ICD-10-CM) - Closed nondisplaced avulsion fracture of left talus, initial encounter   S92.415A (ICD-10-CM) - Nondisplaced fracture of proximal phalanx of left great toe, initial encounter for closed fracture   M79.672 (ICD-10-CM) - Foot pain, left      Evaluation Date: 3/15/2024  Authorization Period Expiration: 12/31/2024  Plan of Care Expiration: 4/26/24  Progress Note Due: 4/15/24  Visit # / Visits authorized: 1/ 1, 2/20  FOTO: 1/ 3    Time In: 8:30 am   Time Out: 9:11 am   Total Billable Time: 41 minutes    Precautions: Standard    Subjective     Pt reports: that (L) ankle/foot/toe has been feeling better. Pt stated that she has been walking more.   She was compliant with home exercise program.  Response to previous treatment: no adverse effects  Functional change: walking more    Pain: 0/10 (L) ankle/foot/toe      Objective       therapeutic exercises and education to develop ROM and flexibility, strength for 27 minutes including:  Bike 5 min for ankle ROM/joint lubrication  Ankle 4 way RTB 2x10 ea   Gastroc stretch w/strap 3x30"   Soleus stretch w/strap 3x30"   Toe yoga 2x10 , reverse toe yoga 2x10   Ankle ABC 2x not performed  Ankle circles CCW x20 not performed  Fitterboard DF/PF, INV/EV 2x10 each not performed   Standing heel raises 2x10 not performed   Standing hip abd/ext 2x10 B ea 2#  Clams B 2x10 not performed       neuromuscular re-education activities to improve: Balance, Coordination, and Proprioception for 14 minutes. The following activities were included:  Standing balance on airex tandem 2x30" ea  Standing alt marches on " "airex 2x10 B   Standing balance on airex rhomberg 3x30" EC  Cone taps on airex 2x10 B  Fwd Step ups on double airex 2x10 B  Lateral step ups on double airex 2x10 B         therapeutic activities to improve functional performance for 0  minutes, including:      Home Exercises Provided and Patient Education Provided     Education provided:   - HEP     Written Home Exercises Provided: yes.  Exercises were reviewed and Grace was able to demonstrate them prior to the end of the session.  Grace demonstrated good  understanding of the education provided.     See EMR under Patient Instructions for exercises provided 4/3/2024.      Assessment     Pt received assistance from PT to hold other toe/toes down when performing toe yoga/reverse toe yoga. Pt was able to tolerate additional therex/NMR activities noted above. Pt reported experiencing no pain at the end of therapy session.   Grace Is progressing well towards her goals.   Pt prognosis is Good.     Pt will continue to benefit from skilled outpatient physical therapy to address the deficits listed in the problem list box on initial evaluation, provide pt/family education and to maximize pt's level of independence in the home and community environment.     Pt's spiritual, cultural and educational needs considered and pt agreeable to plan of care and goals.    Anticipated barriers to physical therapy:     Goals:     Anticipated barriers to physical therapy: none    Goals:   Short Term Goals: 2 weeks   Pt will be compliant with HEP to supplement PT with decreasing pain and improving functional mobility     Long Term Goals: 6 weeks   Pt will improve FOTO score to 70 in order to demo improved functional mobility   Pt will demo (B) DF AROM to at least 10 degrees in order to improve functional gait  Pt will demo (L) ankle AROM =  (R) ankle AROM in all planes in order to improve functional mobility  Pt will improve LE MMT scores by at least 1/2 grade where deficits noted in " order to improve strength for functional tasks  Pt will perform 25 SL heel raises on (B) LE to demo good (B) PF strength  Pt will perform (L) SLS for 30 seconds in order to demo improved (L) SL stability  Pt will report ability to perform ADLs without pain in (L) foot/toes/ankle in order to promote return to PLOF     Plan     Continue per POC, progress as tolerated    Flory Camacho, PT

## 2024-05-27 PROBLEM — Z00.00 ROUTINE GENERAL MEDICAL EXAMINATION AT A HEALTH CARE FACILITY: Status: RESOLVED | Noted: 2019-06-13 | Resolved: 2024-05-27

## 2024-06-26 ENCOUNTER — DOCUMENTATION ONLY (OUTPATIENT)
Dept: REHABILITATION | Facility: HOSPITAL | Age: 59
End: 2024-06-26
Payer: COMMERCIAL

## 2024-06-26 NOTE — PROGRESS NOTES
[facility]   Discharge Note    Name: [place] [facility] [facility] Number: [id number]    Therapy Diagnosis: No diagnosis found.  Physician: No ref. provider found    Physician Orders: PT Eval and Treat   Medical Diagnosis from Referral:   S92.155A (ICD-10-CM) - Closed nondisplaced avulsion fracture of left talus, initial encounter   S92.415A (ICD-10-CM) - Nondisplaced fracture of proximal phalanx of left great toe, initial encounter for closed fracture   M79.672 (ICD-10-CM) - Foot pain, left      Evaluation Date: 3/15/2024      Date of Last visit: 4/3/24  Total Visits Received: 3    ASSESSMENT        Discharge reason: Patient has not attended therapy since 4/3/24      Goals: not able to determine goal status    PLAN   This patient is discharged from Physical Therapy      Flory Camacho, PT

## 2024-07-05 DIAGNOSIS — M25.552 BILATERAL HIP PAIN: Primary | ICD-10-CM

## 2024-07-05 DIAGNOSIS — M25.551 BILATERAL HIP PAIN: Primary | ICD-10-CM

## 2024-07-07 NOTE — PROGRESS NOTES
CC:  58-year-old female presents for evaluation of bilateral hip pain.  The patient has had chronic pain in both of her hips for quite some time.  It has progressively gotten worse especially over the last few years.  In addition to the pain she is having decreased range of motion.  She reports pain with basic daily activities such as walking more than a 1/2 mi per standing for prolonged periods of time.  She occasionally has pain at night.  She is tried p.o. medications and intra-articular injections and physical therapy over the last few years without relief.  Again her pain is progressing.  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  That was many years ago.  She states she initially got some relief of her pain with that is surgery but it was gone onto become progressively worse.    Past Medical History:   Diagnosis Date    Acne     Dermatologist prescribes spironolactone     Anxiety     celexa 40    Basal cell carcinoma     Brachioradial pruritus 08/11/2022    Chronic constipation 05/24/2017    History of patellar fracture 07/17/2019    L, slipped at home    Hot flashes due to menopause 05/24/2017    Lumbar scoliosis     Osteoarthritis of spine with radiculopathy, cervical region 08/11/2022    CT 2014, pruritis    Other osteoporosis without current pathological fracture 05/24/2017    Reclast with Rheum, ankle & patella fracture, mom with dx    Primary osteoarthritis of left hip 05/24/2017    Dr Garay, dry needling at [facility] helping, MRI 2016, Injection didn't help much    Right elbow pain 07/20/2017    dry needling at [facility] helping    Vitamin D deficiency 08/19/2019    Dr Blas started 50,000 q 7d in August 2019       Past Surgical History:   Procedure Laterality Date    COLONOSCOPY N/A 4/8/2019    Procedure: COLONOSCOPY;  Surgeon: Rakesh MENDEZ  MD Ghulam;  Location: [facility] ENDO (4TH FLR);  Service: Endoscopy;  Laterality: N/A; significantly excessive looping of the colon    COLONOSCOPY N/A 6/13/2019    Procedure: COLONOSCOPY - Device-assisted;  Surgeon: Sravan Haji MD;  Location: [facility] (2ND FLR);  Service: Endoscopy;  Laterality: N/A;  Single-balloon scope; unremarkable, repeat in 10 years for screening    right hip surgery      April 24, 2012       Current Outpatient Medications on File Prior to Visit   Medication Sig Dispense Refill    celecoxib (CELEBREX) 100 MG capsule Take 1 capsule (100 mg total) by mouth nightly as needed for Pain. 60 capsule 2    citalopram (CELEXA) 40 MG tablet TAKE 1 TABLET BY MOUTH EVERY DAY 90 tablet 3    clindamycin-benzoyl peroxide gel Aaa qam to face prn flare 45 g 1    LIDOcaine-prilocaine (EMLA) cream Apply topically as needed. 30 g 3    spironolactone (ALDACTONE) 50 MG tablet TAKE 1 TABLET(50 MG) BY MOUTH EVERY MORNING 30 tablet 2    sulfacetamide sodium-sulfur (SULFACLEANSE 8-4) 8-4 % Susp Wash face qhs 473 mL 3    tretinoin (RETIN-A) 0.025 % cream Compound tretinoin 0.025% / niacinamide 2% cream. Apply a pea-sized amount to entire face qhs then moisturize. 30 g 5     No current facility-administered medications on file prior to visit.       ROS:    Constitution: Denies chills, fever, and sweats.  HENT: Denies headaches or blurry vision.  Cardiovascular: Denies chest pain or irregular heart beat.  Respiratory: Denies cough or shortness of breath.  Gastrointestinal: Denies abdominal pain, nausea, or vomiting.  Genitourinary:  Denies urinary incontinence, bladder and kidney issues  Musculoskeletal:  Denies muscle cramps.  Positive for bilateral hip pain  Neurological: Denies dizziness or focal weakness.  Psychiatric/Behavioral: Normal mental status.  Hematologic/Lymphatic: Denies bleeding problem or easy bruising/bleeding.  Skin: Denies rash or suspicious lesions.    Physical examination     Gen - No acute  distress, well nourished, well groomed   Eyes - Extraoccular motions intact, pupils equally round and reactive to light and accommodation   ENT - normocephalic, atruamtic, oropharynx clear   Neck - Supple, no abnormal masses   Cardiovascular - regular rate and rhythm   Pulmonary - clear to auscultation bilaterally, no wheezes, ronchi, or rales   Abdomen - soft, non-tender, non-distended, positive bowel sounds   Psych - The patient is alert and oriented x3 with normal mood and affect    Examination of the Right Lower Extremity    Skin is intact throughout  Motor in intact EHL,FHL,TA,janine  +2 DP/PT  Sensation LT intact D/P/1st    Examination of the Right Hip    C-Sign positive  Logroll positive  Stenchfield positive    Pain with ROM positive    ROM:    Flexion   120  Extension   30  Abduction   45  Adduction   20  External Rotation 45  Internal Rotation 35    Flexion contracture negative    FADIR positive  FADER negative    Tenderness to palpation over lateral and posterolateral greater tochanter negative    Examination of the Left Lower Extremity    Skin is intact throughout  Motor in intact EHL,FHL,TA,janine  +2 DP/PT  Sensation LT intact D/P/1st    Examination of the Left Hip    C-Sign positive  Logroll positive  Stenchfield positive    Pain with ROM positive    ROM:    Flexion   120  Extension   30  Abduction   45  Adduction   20  External Rotation 45  Internal Rotation 35    Flexion contracture negative    FADIR positive  FADER negative    Tenderness to palpation over lateral and posterolateral greater tochanter negative    X-ray images were examined and personally interpreted by me.  Three views of bilateral hips dated 07/08/2024 show osteoarthritis of both hips with loss of joint space, large periarticular osteophytes, and subchondral sclerosis.    Dx:  Bilateral hip osteoarthritis.    Plan:  The patient currently states that the left hip is more symptomatic than the right.  Potential morbidity to the left lower  extremity with both operative and non operative treatments were discussed.  Recommendations for left total hip arthroplasty.  Risks and benefits of the surgery were explained to the patient.  She verbalized understanding and does wish to proceed.  We will schedule that for the next available date that is convenient for her.

## 2024-07-08 ENCOUNTER — HOSPITAL ENCOUNTER (OUTPATIENT)
Dept: RADIOLOGY | Facility: HOSPITAL | Age: 59
Discharge: HOME | End: 2024-07-08
Attending: ORTHOPAEDIC SURGERY
Payer: COMMERCIAL

## 2024-07-08 ENCOUNTER — OFFICE VISIT (OUTPATIENT)
Dept: ORTHOPEDICS | Facility: CLINIC | Age: 59
End: 2024-07-08
Payer: COMMERCIAL

## 2024-07-08 VITALS — WEIGHT: 160.06 LBS | BODY MASS INDEX: 27.33 KG/M2 | HEIGHT: 64 IN

## 2024-07-08 DIAGNOSIS — M25.552 BILATERAL HIP PAIN: ICD-10-CM

## 2024-07-08 DIAGNOSIS — M16.0 BILATERAL PRIMARY OSTEOARTHRITIS OF HIP: Primary | ICD-10-CM

## 2024-07-08 DIAGNOSIS — M25.551 BILATERAL HIP PAIN: ICD-10-CM

## 2024-07-08 PROCEDURE — 73521 X-RAY EXAM HIPS BI 2 VIEWS: CPT | Mod: TC,PO

## 2024-07-08 PROCEDURE — 73521 X-RAY EXAM HIPS BI 2 VIEWS: CPT | Mod: 26,,, | Performed by: RADIOLOGY

## 2024-07-08 PROCEDURE — 99999 PR PBB SHADOW E&M-EST. PATIENT-LVL III: CPT | Mod: PBBFAC,,, | Performed by: ORTHOPAEDIC SURGERY

## 2024-09-20 ENCOUNTER — TELEPHONE (OUTPATIENT)
Dept: ORTHOPEDICS | Facility: CLINIC | Age: 59
End: 2024-09-20
Payer: COMMERCIAL

## 2024-09-20 DIAGNOSIS — M25.552 LEFT HIP PAIN: Primary | ICD-10-CM

## 2024-09-20 NOTE — TELEPHONE ENCOUNTER
Spoke to pt was able to schedule pt on November 19, 2024 at 1:00 PM.    Sincerely,  Courtney Ruiz CMA   Certified Clinical Medical Assistant to Dr. Armando alexandra  Phone: [phone]  Fax: [phone]      ----- Message from Edd Short MA sent at 9/20/2024  3:05 PM CDT -----  Regarding: appt request  Contact: pt [phone]  Type:  Needs Medical Advice    Who Called:  Grace Mack Nurse Visit - Here To Schedule Left FELICIA and Sign Consents previous provider retired     Would the patient rather a call back or a response via [facility]? portal  Best Call Back Number: pt [phone]   Additional Information:   Was the patient seen in the last year in this department? Yes     Does patient have an active prescription for medications requested? No     Received Request Via: Pharmacy

## 2024-11-04 ENCOUNTER — OFFICE VISIT (OUTPATIENT)
Dept: INTERNAL MEDICINE | Facility: CLINIC | Age: 59
End: 2024-11-04
Payer: COMMERCIAL

## 2024-11-04 VITALS
WEIGHT: 155.88 LBS | BODY MASS INDEX: 26.61 KG/M2 | HEART RATE: 87 BPM | SYSTOLIC BLOOD PRESSURE: 112 MMHG | DIASTOLIC BLOOD PRESSURE: 72 MMHG | HEIGHT: 64 IN | OXYGEN SATURATION: 97 %

## 2024-11-04 DIAGNOSIS — J20.9 ACUTE BRONCHITIS, UNSPECIFIED ORGANISM: Primary | ICD-10-CM

## 2024-11-04 PROCEDURE — 1159F MED LIST DOCD IN RCRD: CPT | Mod: CPTII,S$GLB,, | Performed by: INTERNAL MEDICINE

## 2024-11-04 PROCEDURE — 3078F DIAST BP <80 MM HG: CPT | Mod: CPTII,S$GLB,, | Performed by: INTERNAL MEDICINE

## 2024-11-04 PROCEDURE — 3008F BODY MASS INDEX DOCD: CPT | Mod: CPTII,S$GLB,, | Performed by: INTERNAL MEDICINE

## 2024-11-04 PROCEDURE — 99999 PR PBB SHADOW E&M-EST. PATIENT-LVL III: CPT | Mod: PBBFAC,,, | Performed by: INTERNAL MEDICINE

## 2024-11-04 PROCEDURE — 3074F SYST BP LT 130 MM HG: CPT | Mod: CPTII,S$GLB,, | Performed by: INTERNAL MEDICINE

## 2024-11-04 PROCEDURE — 1160F RVW MEDS BY RX/DR IN RCRD: CPT | Mod: CPTII,S$GLB,, | Performed by: INTERNAL MEDICINE

## 2024-11-04 PROCEDURE — 99213 OFFICE O/P EST LOW 20 MIN: CPT | Mod: S$GLB,,, | Performed by: INTERNAL MEDICINE

## 2024-11-04 RX ORDER — PREDNISONE 20 MG/1
TABLET ORAL
Qty: 5 TABLET | Refills: 0 | Status: SHIPPED | OUTPATIENT
Start: 2024-11-04 | End: 2024-11-09

## 2024-11-04 RX ORDER — AMOXICILLIN AND CLAVULANATE POTASSIUM 875; 125 MG/1; MG/1
1 TABLET, FILM COATED ORAL EVERY 12 HOURS
Qty: 10 TABLET | Refills: 0 | Status: SHIPPED | OUTPATIENT
Start: 2024-11-04 | End: 2024-11-09

## 2024-11-04 NOTE — PROGRESS NOTES
Subjective:      Patient ID: Grace Beltran is a 59 y.o. female.    Chief Complaint: Cough (Last Monday/Negative covid)    Grace Beltran presents today for upper respiratory symptoms for 10 days. Patient reports that the symptom is worsening. Reports fevers/congestion/coughing/headache/sputum production.  Denies SOB. Has tried dayquil and nyquil at home with mild improvement. Reports sick contacts. POCT COVID obtained at home x2 which were negative as per the patient, POCT flu negative.    Denies any chest pain, shortness of breath, nausea vomiting constipation diarrhea, blood in stool, heartburn    Review of Systems   Constitutional:  Positive for chills and malaise/fatigue. Negative for diaphoresis, fever and weight loss.   HENT:  Positive for congestion, sinus pain and sore throat. Negative for ear discharge, ear pain and tinnitus.    Eyes:  Negative for pain and discharge.   Respiratory:  Positive for cough and sputum production. Negative for shortness of breath, wheezing and stridor.    Cardiovascular:  Negative for chest pain.   Gastrointestinal:  Negative for abdominal pain, constipation, diarrhea, nausea and vomiting.   Genitourinary:  Negative for frequency and urgency.   Musculoskeletal:  Negative for myalgias.   Skin:  Negative for itching and rash.   Neurological:  Positive for headaches. Negative for dizziness, tingling, tremors and weakness.   Psychiatric/Behavioral:  Negative for depression. The patient is not nervous/anxious and does not have insomnia.          Current Outpatient Medications:     citalopram (CELEXA) 40 MG tablet, TAKE 1 TABLET BY MOUTH EVERY DAY, Disp: 90 tablet, Rfl: 3    amoxicillin-clavulanate 875-125mg (AUGMENTIN) 875-125 mg per tablet, Take 1 tablet by mouth every 12 (twelve) hours. for 5 days, Disp: 10 tablet, Rfl: 0    LIDOcaine-prilocaine (EMLA) cream, Apply topically as needed. (Patient not taking: Reported on 11/4/2024), Disp: 30 g, Rfl: 3    predniSONE  "(DELTASONE) 20 MG tablet, Take 1 po with breakfast x 5d, Disp: 5 tablet, Rfl: 0    spironolactone (ALDACTONE) 50 MG tablet, TAKE 1 TABLET(50 MG) BY MOUTH EVERY MORNING (Patient not taking: Reported on 11/4/2024), Disp: 30 tablet, Rfl: 2    Lab Results   Component Value Date    HGBA1C 5.4 11/17/2023     No results found for: "MICALBCREAT"  Lab Results   Component Value Date    LDLCALC 141.8 11/17/2023    LDLCALC 131.4 02/15/2022    CHOL 234 (H) 11/17/2023    HDL 73 11/17/2023    TRIG 96 11/17/2023       Lab Results   Component Value Date     11/17/2023    K 4.4 11/17/2023     11/17/2023    CO2 30 (H) 11/17/2023    GLU 82 11/17/2023    BUN 13 11/17/2023    CREATININE 0.8 11/17/2023    CALCIUM 9.2 11/17/2023    PROT 6.9 11/17/2023    ALBUMIN 3.7 11/17/2023    BILITOT 0.4 11/17/2023    ALKPHOS 81 11/17/2023    AST 20 11/17/2023    ALT 24 11/17/2023    ANIONGAP 9 11/17/2023    ESTGFRAFRICA >60.0 02/15/2022    EGFRNONAA >60.0 02/15/2022    WBC 4.22 11/17/2023    HGB 13.6 11/17/2023    HGB 14.2 02/15/2022    HCT 41.8 11/17/2023    MCV 92 11/17/2023     11/17/2023    TSH 1.373 11/17/2023    HEPCAB Negative 05/18/2017       Lab Results   Component Value Date    FSH 6.2 01/12/2004    PROGESTERONE 16.3 (H) 02/25/2004    ESTRADIOL 110 01/12/2004    KZWCGYSU17BV 23 (L) 01/20/2023    QWPEKJBP18ZY 41 03/03/2022         Past Medical History:   Diagnosis Date    Acne     Dermatologist prescribes spironolactone     Anxiety     celexa 39    Basal cell carcinoma     Brachioradial pruritus 08/11/2022    Chronic constipation 05/24/2017    History of patellar fracture 07/17/2019    L, slipped at home    Hot flashes due to menopause 05/24/2017    Lumbar scoliosis     Osteoarthritis of spine with radiculopathy, cervical region 08/11/2022    CT 2014, pruritis    Other osteoporosis without current pathological fracture 05/24/2017    Reclast with Rheum, ankle & patella fracture, mom with dx    Primary osteoarthritis of left " "hip 05/24/2017    Dr Garay, dry needling at [facility], MRI 2016, Injection didn't help much    Right elbow pain 07/20/2017    dry needling at [facility]    Vitamin D deficiency 08/19/2019    Dr Blas started 50,000 q 7d in August 2019     Past Surgical History:   Procedure Laterality Date    COLONOSCOPY N/A 4/8/2019    Procedure: COLONOSCOPY;  Surgeon: Rakesh Parmar MD;  Location: [facility] ENDO (4TH FLR);  Service: Endoscopy;  Laterality: N/A; significantly excessive looping of the colon    COLONOSCOPY N/A 6/13/2019    Procedure: COLONOSCOPY - Device-assisted;  Surgeon: Sravan Haji MD;  Location: [facility] ENDO (2ND FLR);  Service: Endoscopy;  Laterality: N/A;  Single-balloon scope; unremarkable, repeat in 10 years for screening    right hip surgery      April 24, 2012     Social History     Social History Narrative    Not on file     Family History   Problem Relation Name Age of Onset    Hypertension Mother      Hyperlipidemia Mother      Osteoporosis Mother      Breast cancer Mother  81    Skin cancer Father      Breast cancer Maternal Aunt  65    Melanoma Maternal Grandfather      Colon cancer Neg Hx      Colon polyps Neg Hx      Crohn's disease Neg Hx      Ulcerative colitis Neg Hx      Stomach cancer Neg Hx      Esophageal cancer Neg Hx       Vitals:    11/04/24 1342   BP: 112/72   Pulse: 87   SpO2: 97%   Weight: 70.7 kg (155 lb 13.8 oz)   Height: 5' 4" (1.626 m)   PainSc: 0-No pain     Objective:   Physical Exam  Constitutional:       General: She is not in acute distress.     Appearance: Normal appearance. She is not ill-appearing.   HENT:      Head: Normocephalic.      Right Ear: Tympanic membrane, ear canal and external ear normal.      Left Ear: Tympanic membrane, ear canal and external ear normal.      Nose: Congestion and rhinorrhea present.      Mouth/Throat:      Mouth: Mucous membranes are moist.      Pharynx: No oropharyngeal exudate or posterior oropharyngeal erythema.   Eyes:      " Extraocular Movements: Extraocular movements intact.      Conjunctiva/sclera: Conjunctivae normal.      Pupils: Pupils are equal, round, and reactive to light.   Cardiovascular:      Rate and Rhythm: Normal rate.   Pulmonary:      Effort: Pulmonary effort is normal. No respiratory distress.      Breath sounds: Normal breath sounds. No wheezing.   Chest:      Chest wall: No tenderness.   Musculoskeletal:      Cervical back: Normal range of motion.   Neurological:      Mental Status: She is alert.       Assessment:     1. Acute bronchitis, unspecified organism      Plan:     Orders Placed This Encounter    amoxicillin-clavulanate 875-125mg (AUGMENTIN) 875-125 mg per tablet    predniSONE (DELTASONE) 20 MG tablet       There are no Patient Instructions on file for this visit.  All of your core healthy metrics are met.

## 2024-12-13 ENCOUNTER — PATIENT MESSAGE (OUTPATIENT)
Dept: PRIMARY CARE CLINIC | Facility: CLINIC | Age: 59
End: 2024-12-13
Payer: COMMERCIAL

## 2024-12-13 DIAGNOSIS — Z01.810 PREOP CARDIOVASCULAR EXAM: Primary | ICD-10-CM

## 2024-12-13 DIAGNOSIS — M16.11 PRIMARY OSTEOARTHRITIS OF RIGHT HIP: ICD-10-CM

## 2024-12-16 NOTE — TELEPHONE ENCOUNTER
"Please call pt - I'm so sorry that your Orthopedist Dr. Fred Medina at the [facility] didn't let you know earlier about getting a preoperative clearance one month prior to surgery in case something abnormal is found.     I'm unable to clear your lungs & heart for anesthesia without a current EKG & chest xray - If Dr Medina has these reports, please send them to me.     Please see if she can urgently get preop appt w Cardiology. Referral in & also orders in for CXR & EKG.     Unfortunately, I"m out of the office this week - today Monday, Thurs & Friday  "

## 2024-12-16 NOTE — TELEPHONE ENCOUNTER
Pt state she had EKG and chest X-ray done at [facility] on 12/10/24. I told her to have them fax us a copy.

## 2025-01-27 ENCOUNTER — OFFICE VISIT (OUTPATIENT)
Dept: INTERNAL MEDICINE | Facility: CLINIC | Age: 60
End: 2025-01-27
Payer: COMMERCIAL

## 2025-01-27 VITALS
HEART RATE: 84 BPM | WEIGHT: 158.19 LBS | BODY MASS INDEX: 27.01 KG/M2 | HEIGHT: 64 IN | SYSTOLIC BLOOD PRESSURE: 122 MMHG | OXYGEN SATURATION: 94 % | DIASTOLIC BLOOD PRESSURE: 80 MMHG

## 2025-01-27 DIAGNOSIS — M81.0 OSTEOPOROSIS, UNSPECIFIED OSTEOPOROSIS TYPE, UNSPECIFIED PATHOLOGICAL FRACTURE PRESENCE: ICD-10-CM

## 2025-01-27 DIAGNOSIS — M47.22 OSTEOARTHRITIS OF SPINE WITH RADICULOPATHY, CERVICAL REGION: ICD-10-CM

## 2025-01-27 DIAGNOSIS — Z12.31 OTHER SCREENING MAMMOGRAM: ICD-10-CM

## 2025-01-27 DIAGNOSIS — Z00.00 ROUTINE GENERAL MEDICAL EXAMINATION AT A HEALTH CARE FACILITY: Primary | ICD-10-CM

## 2025-01-27 DIAGNOSIS — F41.9 ANXIETY: ICD-10-CM

## 2025-01-27 DIAGNOSIS — Z23 NEED FOR VACCINATION: ICD-10-CM

## 2025-01-27 PROCEDURE — 90471 IMMUNIZATION ADMIN: CPT | Mod: S$GLB,,, | Performed by: INTERNAL MEDICINE

## 2025-01-27 PROCEDURE — 3008F BODY MASS INDEX DOCD: CPT | Mod: CPTII,S$GLB,, | Performed by: INTERNAL MEDICINE

## 2025-01-27 PROCEDURE — 1160F RVW MEDS BY RX/DR IN RCRD: CPT | Mod: CPTII,S$GLB,, | Performed by: INTERNAL MEDICINE

## 2025-01-27 PROCEDURE — 90715 TDAP VACCINE 7 YRS/> IM: CPT | Mod: S$GLB,,, | Performed by: INTERNAL MEDICINE

## 2025-01-27 PROCEDURE — 1159F MED LIST DOCD IN RCRD: CPT | Mod: CPTII,S$GLB,, | Performed by: INTERNAL MEDICINE

## 2025-01-27 PROCEDURE — 99999 PR PBB SHADOW E&M-EST. PATIENT-LVL IV: CPT | Mod: PBBFAC,,, | Performed by: INTERNAL MEDICINE

## 2025-01-27 PROCEDURE — 90677 PCV20 VACCINE IM: CPT | Mod: S$GLB,,, | Performed by: INTERNAL MEDICINE

## 2025-01-27 PROCEDURE — 3079F DIAST BP 80-89 MM HG: CPT | Mod: CPTII,S$GLB,, | Performed by: INTERNAL MEDICINE

## 2025-01-27 PROCEDURE — 99396 PREV VISIT EST AGE 40-64: CPT | Mod: 25,S$GLB,, | Performed by: INTERNAL MEDICINE

## 2025-01-27 PROCEDURE — 3074F SYST BP LT 130 MM HG: CPT | Mod: CPTII,S$GLB,, | Performed by: INTERNAL MEDICINE

## 2025-01-27 PROCEDURE — 90472 IMMUNIZATION ADMIN EACH ADD: CPT | Mod: S$GLB,,, | Performed by: INTERNAL MEDICINE

## 2025-01-27 RX ORDER — CITALOPRAM 40 MG/1
40 TABLET, FILM COATED ORAL DAILY
Qty: 90 TABLET | Refills: 3 | Status: SHIPPED | OUTPATIENT
Start: 2025-01-27

## 2025-01-27 NOTE — PROGRESS NOTES
Subjective:      Patient ID: Grace Beltran is a 59 y.o. female.    Chief Complaint: Establish Care      Grace Beltran is a 59 y.o. female with chronic conditions significant for anxiety, hx of osteoporosis s/p 1 round of reclast, OA, recent hip replacement 12/2024.  Presenting today E[facility].     The patient consents verbally to being recorded for [facility] service today.     History of Present Illness    CHIEF COMPLAINT:  Ms. Beltran presents today for follow-up after right hip replacement.    POST-OPERATIVE RECOVERY:  She underwent right hip replacement surgery on December 19th at [facility] by Dr. Fred Medina. Her recovery has been more painful than anticipated. She is currently attending physical therapy sessions 2 times per week. She completed all post-surgical medications including Celebrex and Gabapentin, and did not require the prescribed Tramadol for pain management.    WEIGHT AND PHYSICAL ACTIVITY:  She reports weight gain due to limited mobility. Prior to surgery, severe hip pain restricted her walking to approximately two blocks, significantly reducing her previous routine of longer dog walks.    MEDICAL HISTORY:  She has a history of osteoporosis previously treated with one Reclast injection, timing unknown.    MENOPAUSAL SYMPTOMS:  She initially experienced menopause around age 49 with hot flashes lasting approximately one year. She reports a recent return of hot flashes occurring randomly, regardless of ambient temperature.    CURRENT MEDICATIONS:  She continues Celexa for anxiety management with no reported side effects.    Visit today is associated with current or anticipated ongoing medical care related to this patient's single serious condition/complex condition of anxiety, hx of osteoporosis s/p 1 round of reclast, OA, recent hip replacement 12/2024.. The patient will return to see me as these issues will be followed longitudinally.    ROS:  General:  "-fever, -chills, -fatigue, +weight gain, -weight loss  Eyes: -vision changes, -redness, -discharge  ENT: -ear pain, -nasal congestion, -sore throat  Cardiovascular: -chest pain, -palpitations, -lower extremity edema  Respiratory: -cough, -shortness of breath  Gastrointestinal: -abdominal pain, -nausea, -vomiting, -diarrhea, -constipation, -blood in stool  Genitourinary: -dysuria, -hematuria, -frequency  Musculoskeletal: -joint pain, -muscle pain  Skin: -rash, -lesion  Neurological: -headache, -dizziness, -numbness, -tingling  Psychiatric: -anxiety, -depression, -sleep difficulty            Current Outpatient Medications:     citalopram (CELEXA) 40 MG tablet, Take 1 tablet (40 mg total) by mouth once daily., Disp: 90 tablet, Rfl: 3  No current facility-administered medications for this visit.    Lab Results   Component Value Date    HGBA1C 5.4 11/17/2023     No results found for: "MICALBCREAT"  Lab Results   Component Value Date    LDLCALC 141.8 11/17/2023    LDLCALC 131.4 02/15/2022    CHOL 234 (H) 11/17/2023    HDL 73 11/17/2023    TRIG 96 11/17/2023       Lab Results   Component Value Date     11/17/2023    K 4.4 11/17/2023     11/17/2023    CO2 30 (H) 11/17/2023    GLU 82 11/17/2023    BUN 13 11/17/2023    CREATININE 0.8 11/17/2023    CALCIUM 9.2 11/17/2023    PROT 6.9 11/17/2023    ALBUMIN 3.7 11/17/2023    BILITOT 0.4 11/17/2023    ALKPHOS 81 11/17/2023    AST 20 11/17/2023    ALT 24 11/17/2023    ANIONGAP 9 11/17/2023    ESTGFRAFRICA >60.0 02/15/2022    EGFRNONAA >60.0 02/15/2022    WBC 4.22 11/17/2023    HGB 13.6 11/17/2023    HGB 14.2 02/15/2022    HCT 41.8 11/17/2023    MCV 92 11/17/2023     11/17/2023    TSH 1.373 11/17/2023    HEPCAB Negative 05/18/2017       Lab Results   Component Value Date    FSH 6.2 01/12/2004    PROGESTERONE 16.3 (H) 02/25/2004    ESTRADIOL 110 01/12/2004    YJFITJZC23BE 23 (L) 01/20/2023    BYFQURZY42MT 41 03/03/2022         Past Medical History:   Diagnosis Date "    Acne     Dermatologist prescribes spironolactone     Anxiety     celexa 40    Basal cell carcinoma     Brachioradial pruritus 08/11/2022    Chronic constipation 05/24/2017    History of patellar fracture 07/17/2019    L, slipped at home    Hot flashes due to menopause 05/24/2017    Lumbar scoliosis     Osteoarthritis of spine with radiculopathy, cervical region 08/11/2022    CT 2014, pruritis    Other osteoporosis without current pathological fracture 05/24/2017    Reclast with Rheum, ankle & patella fracture, mom with dx    Primary osteoarthritis of left hip 05/24/2017    Dr Garay, dry needling at [facility] helping, MRI 2016, Injection didn't help much    Right elbow pain 07/20/2017    dry needling at [facility] [place] [place] [facility] helping    Vitamin D deficiency 08/19/2019    Dr Blas started 50,000 q 7d in August 2019     Past Surgical History:   Procedure Laterality Date    COLONOSCOPY N/A 04/08/2019    Procedure: COLONOSCOPY;  Surgeon: Rakesh Parmar MD;  Location: [facility] ENDO (4TH FLR);  Service: Endoscopy;  Laterality: N/A; significantly excessive looping of the colon    COLONOSCOPY N/A 06/13/2019    Procedure: COLONOSCOPY - Device-assisted;  Surgeon: Sravan Haji MD;  Location: [facility] ENDO (2ND FLR);  Service: Endoscopy;  Laterality: N/A;  Single-balloon scope; unremarkable, repeat in 10 years for screening    HIP REPLACEMENT ARTHROPLASTY Right 12/19/2024    right hip surgery      April 24, 2012     Social History     Social History Narrative    Not on file     Family History   Problem Relation Name Age of Onset    Hypertension Mother      Hyperlipidemia Mother      Osteoporosis Mother      Breast cancer Mother  81    Skin cancer Father      Breast cancer Maternal Aunt  65    Melanoma Maternal Grandfather      Colon cancer Neg Hx      Colon polyps Neg Hx      Crohn's disease Neg Hx      Ulcerative colitis Neg Hx      Stomach cancer Neg Hx      Esophageal cancer Neg Hx       Vitals:    01/27/25 1023   BP: 122/80   Pulse:  "84   SpO2: (!) 94%   Weight: 71.7 kg (158 lb 2.9 oz)   Height: 5' 4" (1.626 m)   PainSc:   2   PainLoc: Hip     Objective:   Physical Exam    General: No acute distress. Well-developed. Well-nourished.  Eyes: EOMI. Sclerae anicteric.  HENT: Normocephalic. Atraumatic. Nares patent. Moist oral mucosa.  Ears: Bilateral TMs clear. Bilateral EACs clear.  Cardiovascular: Regular rate. Regular rhythm. No murmurs. No rubs. No gallops. Normal S1, S2.  Respiratory: Normal respiratory effort. Clear to auscultation bilaterally. No rales. No rhonchi. No wheezing.  Abdomen: Soft. Non-tender. Non-distended. Normoactive bowel sounds.  Musculoskeletal: No  obvious deformity.  Extremities: No lower extremity edema. Leg is slightly swollen.  Neurological: Alert & oriented x3. No slurred speech. Normal gait.  Psychiatric: Normal mood. Normal affect. Good insight. Good judgment.  Skin: Warm. Dry. No rash.        Assessment/Plan     Assessment & Plan    - Reviewed right hip replacement surgery and recovery progress  - Assessed menopausal symptoms, including recurrent hot flashes  - Evaluated cholesterol levels, noting mild elevation not requiring medication at this time  - Considered history of osteoporosis and previous Reclast treatment  - Assessed need for vaccinations, including shingles and tetanus booster    MENOPAUSE:  - Explained normal fluctuations in menopausal symptoms, including potential for intermittent hot flashes years after initial onset.    THYROID DISORDER:  - Provided information on thyroid function tests, explaining the relationship between TSH and T4 levels.    HIP REPLACEMENT:  - Ms. Beltran to continue physical therapy for hip replacement recovery.    ANXIETY:  - Continued Celexa 40mg for anxiety management.  - Refilled Celexa, providing a 90-day supply with 3 refills, to be sent to [facility].    LABS:  - CBC, CMP, lipid panel, and thyroid level ordered as routine annual labs.    MAMMOGRAM:  - Mammogram " ordered    BONE HEALTH:  - DEXA scan ordered   - Follow up for virtual appointment if DEXA scan shows osteoporosis has returned and medication needs to be restarted.    TETANUS VACCINATION:  - Tetanus booster administered in office       Routine general medical examination at a health care facility  -     Hemoglobin A1C; Future; Expected date: 01/27/2025  -     CBC Auto Differential; Future; Expected date: 01/27/2025  -     Comprehensive Metabolic Panel; Future; Expected date: 01/27/2025  -     TSH; Future; Expected date: 01/27/2025  -     Mammo Digital Screening Bilat; Future; Expected date: 04/02/2025  -     Lipid Panel; Future; Expected date: 01/27/2025  -     DXA Bone Density Axial Skeleton 1 or more sites; Future; Expected date: 02/03/2025    Osteoarthritis of spine with radiculopathy, cervical region    Anxiety  -     citalopram (CELEXA) 40 MG tablet; Take 1 tablet (40 mg total) by mouth once daily.  Dispense: 90 tablet; Refill: 3  -     Comprehensive Metabolic Panel; Future; Expected date: 01/27/2025  -     TSH; Future; Expected date: 01/27/2025    Other screening mammogram  -     Mammo Digital Screening Bilat; Future; Expected date: 04/02/2025    Osteoporosis, unspecified osteoporosis type, unspecified pathological fracture presence  -     DXA Bone Density Axial Skeleton 1 or more sites; Future; Expected date: 02/03/2025    Need for vaccination  -     pneumoc 20-jos conj-dip cr(PF) (PREVNAR-20 (PF)) injection Syrg 0.5 mL  -     Tdap (BOOSTRIX) vaccine injection 0.5 mL        Chronic conditions status updated as per HPI.  Other than changes above, cont current medications and maintain follow up with specialists.  Return to clinic in Follow up in about 1 year (around 1/27/2026).      Caty Garza MD  [facility]    Total time spent on this encounter: 32 minutes. This includes face to face time and non-face to face time preparing to see the patient (eg, review of tests), obtaining and/or reviewing  separately obtained history, documenting clinical information in the electronic or other health record, independently interpreting results and communicating results to the patient/family/caregiver, or care coordinator.    This note was generated with the assistance of ambient listening technology. Verbal consent was obtained by the patient and accompanying visitor(s) for the recording of patient appointment to facilitate this note. I attest to having reviewed and edited the generated note for accuracy, though some syntax or spelling errors may persist. Please contact the author of this note for any clarification.       There are no Patient Instructions on file for this visit.  All of your core healthy metrics are met.

## 2025-01-27 NOTE — PROGRESS NOTES
After obtaining consent, and per orders of Dr. Garza, injection of PCV 20 Lot [id number]  Exp 04/30/2026  given in the LD  by Jeff Talavera LPN. Patient tolerated well and band aid applied. Patient instructed to remain in clinic for 15 minutes afterwards, and to report any adverse reaction to me immediately.      After obtaining consent, and per orders of Dr. Garza, injection of Tdap Lot [id number]  Exp 09/28/25  given in the RD  by Jeff Talavera LPN. Patient tolerated well and band aid applied. Patient instructed to remain in clinic for 15 minutes afterwards, and to report any adverse reaction to me immediately.

## 2025-01-30 ENCOUNTER — LAB VISIT (OUTPATIENT)
Dept: LAB | Facility: HOSPITAL | Age: 60
End: 2025-01-30
Attending: INTERNAL MEDICINE
Payer: COMMERCIAL

## 2025-01-30 DIAGNOSIS — Z00.00 ROUTINE GENERAL MEDICAL EXAMINATION AT A HEALTH CARE FACILITY: ICD-10-CM

## 2025-01-30 DIAGNOSIS — F41.9 ANXIETY: ICD-10-CM

## 2025-01-30 LAB
ALBUMIN SERPL BCP-MCNC: 3.7 G/DL (ref 3.5–5.2)
ALP SERPL-CCNC: 115 U/L (ref 40–150)
ALT SERPL W/O P-5'-P-CCNC: 32 U/L (ref 10–44)
ANION GAP SERPL CALC-SCNC: 7 MMOL/L (ref 8–16)
AST SERPL-CCNC: 26 U/L (ref 10–40)
BASOPHILS # BLD AUTO: 0.03 K/UL (ref 0–0.2)
BASOPHILS NFR BLD: 0.7 % (ref 0–1.9)
BILIRUB SERPL-MCNC: 0.4 MG/DL (ref 0.1–1)
BUN SERPL-MCNC: 11 MG/DL (ref 6–20)
CALCIUM SERPL-MCNC: 9.1 MG/DL (ref 8.7–10.5)
CHLORIDE SERPL-SCNC: 103 MMOL/L (ref 95–110)
CHOLEST SERPL-MCNC: 216 MG/DL (ref 120–199)
CHOLEST/HDLC SERPL: 3.2 {RATIO} (ref 2–5)
CO2 SERPL-SCNC: 27 MMOL/L (ref 23–29)
CREAT SERPL-MCNC: 0.7 MG/DL (ref 0.5–1.4)
DIFFERENTIAL METHOD BLD: NORMAL
EOSINOPHIL # BLD AUTO: 0.1 K/UL (ref 0–0.5)
EOSINOPHIL NFR BLD: 2.6 % (ref 0–8)
ERYTHROCYTE [DISTWIDTH] IN BLOOD BY AUTOMATED COUNT: 13.1 % (ref 11.5–14.5)
EST. GFR  (NO RACE VARIABLE): >60 ML/MIN/1.73 M^2
ESTIMATED AVG GLUCOSE: 94 MG/DL (ref 68–131)
GLUCOSE SERPL-MCNC: 85 MG/DL (ref 70–110)
HBA1C MFR BLD: 4.9 % (ref 4–5.6)
HCT VFR BLD AUTO: 40 % (ref 37–48.5)
HDLC SERPL-MCNC: 67 MG/DL (ref 40–75)
HDLC SERPL: 31 % (ref 20–50)
HGB BLD-MCNC: 12.8 G/DL (ref 12–16)
IMM GRANULOCYTES # BLD AUTO: 0.01 K/UL (ref 0–0.04)
IMM GRANULOCYTES NFR BLD AUTO: 0.2 % (ref 0–0.5)
LDLC SERPL CALC-MCNC: 133.4 MG/DL (ref 63–159)
LYMPHOCYTES # BLD AUTO: 1.2 K/UL (ref 1–4.8)
LYMPHOCYTES NFR BLD: 28.6 % (ref 18–48)
MCH RBC QN AUTO: 30 PG (ref 27–31)
MCHC RBC AUTO-ENTMCNC: 32 G/DL (ref 32–36)
MCV RBC AUTO: 94 FL (ref 82–98)
MONOCYTES # BLD AUTO: 0.3 K/UL (ref 0.3–1)
MONOCYTES NFR BLD: 7.8 % (ref 4–15)
NEUTROPHILS # BLD AUTO: 2.5 K/UL (ref 1.8–7.7)
NEUTROPHILS NFR BLD: 60.1 % (ref 38–73)
NONHDLC SERPL-MCNC: 149 MG/DL
NRBC BLD-RTO: 0 /100 WBC
PLATELET # BLD AUTO: 280 K/UL (ref 150–450)
PMV BLD AUTO: 10.2 FL (ref 9.2–12.9)
POTASSIUM SERPL-SCNC: 4.2 MMOL/L (ref 3.5–5.1)
PROT SERPL-MCNC: 7 G/DL (ref 6–8.4)
RBC # BLD AUTO: 4.26 M/UL (ref 4–5.4)
SODIUM SERPL-SCNC: 137 MMOL/L (ref 136–145)
TRIGL SERPL-MCNC: 78 MG/DL (ref 30–150)
TSH SERPL DL<=0.005 MIU/L-ACNC: 0.86 UIU/ML (ref 0.4–4)
WBC # BLD AUTO: 4.23 K/UL (ref 3.9–12.7)

## 2025-01-30 PROCEDURE — 80061 LIPID PANEL: CPT | Performed by: INTERNAL MEDICINE

## 2025-01-30 PROCEDURE — 85025 COMPLETE CBC W/AUTO DIFF WBC: CPT | Performed by: INTERNAL MEDICINE

## 2025-01-30 PROCEDURE — 84443 ASSAY THYROID STIM HORMONE: CPT | Performed by: INTERNAL MEDICINE

## 2025-01-30 PROCEDURE — 80053 COMPREHEN METABOLIC PANEL: CPT | Performed by: INTERNAL MEDICINE

## 2025-01-30 PROCEDURE — 83036 HEMOGLOBIN GLYCOSYLATED A1C: CPT | Performed by: INTERNAL MEDICINE

## 2025-01-30 PROCEDURE — 36415 COLL VENOUS BLD VENIPUNCTURE: CPT | Mod: PN | Performed by: INTERNAL MEDICINE

## 2025-03-31 ENCOUNTER — APPOINTMENT (OUTPATIENT)
Dept: RADIOLOGY | Facility: CLINIC | Age: 60
End: 2025-03-31
Attending: INTERNAL MEDICINE
Payer: COMMERCIAL

## 2025-03-31 DIAGNOSIS — Z00.00 ROUTINE GENERAL MEDICAL EXAMINATION AT A HEALTH CARE FACILITY: ICD-10-CM

## 2025-03-31 DIAGNOSIS — M81.0 OSTEOPOROSIS, UNSPECIFIED OSTEOPOROSIS TYPE, UNSPECIFIED PATHOLOGICAL FRACTURE PRESENCE: ICD-10-CM

## 2025-03-31 PROCEDURE — 77080 DXA BONE DENSITY AXIAL: CPT | Mod: TC,PO

## 2025-03-31 PROCEDURE — 77080 DXA BONE DENSITY AXIAL: CPT | Mod: 26,,, | Performed by: INTERNAL MEDICINE

## 2025-04-07 ENCOUNTER — RESULTS FOLLOW-UP (OUTPATIENT)
Dept: INTERNAL MEDICINE | Facility: CLINIC | Age: 60
End: 2025-04-07
Payer: COMMERCIAL

## 2025-04-07 ENCOUNTER — TELEPHONE (OUTPATIENT)
Dept: INTERNAL MEDICINE | Facility: CLINIC | Age: 60
End: 2025-04-07

## 2025-04-07 DIAGNOSIS — Z91.89 AT HIGH RISK FOR BREAST CANCER: Primary | ICD-10-CM

## 2025-04-07 NOTE — TELEPHONE ENCOUNTER
----- Message from Caty Garza MD sent at 4/7/2025  9:30 AM CDT -----  Your Bone density test shows OSTEOPOROSIS - meaning that you have thinning of your bones.    You are at an increased risk for hip fracture, spinal compression fracture in the future.    No additional treatment is recommended at this time.     Continue the following:  Focus on 1200 mg of CALCIUM daily  Also, 7966-1430 units of VITAMIN D daily   ----- Message -----  From: Interface, Rad Results In  Sent: 4/4/2025   4:17 PM CDT  To: Caty Garza MD

## 2025-04-14 ENCOUNTER — OCHSNER VIRTUAL EMERGENCY DEPARTMENT (OUTPATIENT)
Facility: CLINIC | Age: 60
End: 2025-04-14
Payer: COMMERCIAL

## 2025-04-14 ENCOUNTER — PATIENT OUTREACH (OUTPATIENT)
Facility: OTHER | Age: 60
End: 2025-04-14
Payer: COMMERCIAL

## 2025-04-14 ENCOUNTER — NURSE TRIAGE (OUTPATIENT)
Dept: ADMINISTRATIVE | Facility: CLINIC | Age: 60
End: 2025-04-14
Payer: COMMERCIAL

## 2025-04-14 ENCOUNTER — PATIENT MESSAGE (OUTPATIENT)
Dept: INTERNAL MEDICINE | Facility: CLINIC | Age: 60
End: 2025-04-14
Payer: COMMERCIAL

## 2025-04-14 ENCOUNTER — HOSPITAL ENCOUNTER (EMERGENCY)
Facility: HOSPITAL | Age: 60
Discharge: HOME | End: 2025-04-15
Attending: STUDENT IN AN ORGANIZED HEALTH CARE EDUCATION/TRAINING PROGRAM
Payer: COMMERCIAL

## 2025-04-14 DIAGNOSIS — H53.2 VERTICAL DIPLOPIA: Primary | ICD-10-CM

## 2025-04-14 DIAGNOSIS — H49.01 RIGHT OCULOMOTOR NERVE PALSY: Primary | ICD-10-CM

## 2025-04-14 LAB
ABSOLUTE EOSINOPHIL (OHS): 0.1 K/UL
ABSOLUTE MONOCYTE (OHS): 0.36 K/UL (ref 0.3–1)
ABSOLUTE NEUTROPHIL COUNT (OHS): 2.91 K/UL (ref 1.8–7.7)
ALBUMIN SERPL BCP-MCNC: 4.1 G/DL (ref 3.5–5.2)
ALP SERPL-CCNC: 111 UNIT/L (ref 40–150)
ALT SERPL W/O P-5'-P-CCNC: 24 UNIT/L (ref 10–44)
ANION GAP (OHS): 14 MMOL/L (ref 8–16)
AST SERPL-CCNC: 24 UNIT/L (ref 11–45)
BASOPHILS # BLD AUTO: 0.02 K/UL
BASOPHILS NFR BLD AUTO: 0.4 %
BILIRUB SERPL-MCNC: 0.3 MG/DL (ref 0.1–1)
BUN SERPL-MCNC: 15 MG/DL (ref 6–20)
CALCIUM SERPL-MCNC: 9.5 MG/DL (ref 8.7–10.5)
CHLORIDE SERPL-SCNC: 104 MMOL/L (ref 95–110)
CO2 SERPL-SCNC: 25 MMOL/L (ref 23–29)
CREAT SERPL-MCNC: 1 MG/DL (ref 0.5–1.4)
ERYTHROCYTE [DISTWIDTH] IN BLOOD BY AUTOMATED COUNT: 12.8 % (ref 11.5–14.5)
GFR SERPLBLD CREATININE-BSD FMLA CKD-EPI: >60 ML/MIN/1.73/M2
GLUCOSE SERPL-MCNC: 101 MG/DL (ref 70–110)
HCT VFR BLD AUTO: 42.6 % (ref 37–48.5)
HGB BLD-MCNC: 13.8 GM/DL (ref 12–16)
IMM GRANULOCYTES # BLD AUTO: 0.01 K/UL (ref 0–0.04)
IMM GRANULOCYTES NFR BLD AUTO: 0.2 % (ref 0–0.5)
LYMPHOCYTES # BLD AUTO: 1.88 K/UL (ref 1–4.8)
MCH RBC QN AUTO: 28.8 PG (ref 27–31)
MCHC RBC AUTO-ENTMCNC: 32.4 G/DL (ref 32–36)
MCV RBC AUTO: 89 FL (ref 82–98)
NUCLEATED RBC (/100WBC) (OHS): 0 /100 WBC
PLATELET # BLD AUTO: 318 K/UL (ref 150–450)
PMV BLD AUTO: 10.1 FL (ref 9.2–12.9)
POTASSIUM SERPL-SCNC: 3.8 MMOL/L (ref 3.5–5.1)
PROT SERPL-MCNC: 7.8 GM/DL (ref 6–8.4)
RBC # BLD AUTO: 4.79 M/UL (ref 4–5.4)
RELATIVE EOSINOPHIL (OHS): 1.9 %
RELATIVE LYMPHOCYTE (OHS): 35.6 % (ref 18–48)
RELATIVE MONOCYTE (OHS): 6.8 % (ref 4–15)
RELATIVE NEUTROPHIL (OHS): 55.1 % (ref 38–73)
SODIUM SERPL-SCNC: 143 MMOL/L (ref 136–145)
TSH SERPL-ACNC: 1.99 UIU/ML (ref 0.4–4)
WBC # BLD AUTO: 5.28 K/UL (ref 3.9–12.7)

## 2025-04-14 PROCEDURE — 99285 EMERGENCY DEPT VISIT HI MDM: CPT | Mod: 25

## 2025-04-14 PROCEDURE — 25500020 PHARM REV CODE 255: Performed by: STUDENT IN AN ORGANIZED HEALTH CARE EDUCATION/TRAINING PROGRAM

## 2025-04-14 PROCEDURE — 85025 COMPLETE CBC W/AUTO DIFF WBC: CPT | Performed by: EMERGENCY MEDICINE

## 2025-04-14 PROCEDURE — A9585 GADOBUTROL INJECTION: HCPCS | Performed by: STUDENT IN AN ORGANIZED HEALTH CARE EDUCATION/TRAINING PROGRAM

## 2025-04-14 PROCEDURE — 80053 COMPREHEN METABOLIC PANEL: CPT | Performed by: EMERGENCY MEDICINE

## 2025-04-14 PROCEDURE — 84443 ASSAY THYROID STIM HORMONE: CPT | Performed by: STUDENT IN AN ORGANIZED HEALTH CARE EDUCATION/TRAINING PROGRAM

## 2025-04-14 RX ORDER — GADOBUTROL 604.72 MG/ML
8 INJECTION INTRAVENOUS
Status: COMPLETED | OUTPATIENT
Start: 2025-04-14 | End: 2025-04-14

## 2025-04-14 RX ADMIN — IOHEXOL 75 ML: 350 INJECTION, SOLUTION INTRAVENOUS at 09:04

## 2025-04-14 RX ADMIN — GADOBUTROL 8 ML: 604.72 INJECTION INTRAVENOUS at 11:04

## 2025-04-14 NOTE — PROGRESS NOTES
"Patient spoke to [facility] RN on call nurse to report the following, "Patient reports she has been having double vision since last Thursday and saw her optometrist today who recommended she get a MRI. She attached the letter to her portal. She is asking if this can wait until tomorrow when she can talk to her PCP or should she go to ER."    [facility] RN on call nurse consulted with Augie MD on call, Dr. Jane Beth, and disposition was for patient to be seen in the Emergency Department. Follow up scheduled on 04/15/2025 to outreach the patient to assess for any additional needs/concerns following ED visit.     "

## 2025-04-14 NOTE — PLAN OF CARE-OVED
[facility] Plan of Care Note  Referral Source: Nurse On-Call                               Chief Complaint   Patient presents with    Diplopia     58 yo female with PMhx of anxiety, basal cell carcinoma, OA calls nursing hotline for concern for double vision. Patient saw optometrist today and referred to ED for right third nerve palsy for MRI of brain (with and without). Note requesting an MRI brain and orbits with and without contrast as well as evaluation of cardiovascular, DM and to rule out potential aneurysm.       Recommendation: Emergency Department            Emergency Department: [facility]             Spoke with Physicians at [facility] and [facility], both have MRI capabilities at this time. ([facility] until 730 pm). Advised to go to [facility]. Patient declines. Advised to go to [facility]. Patient states she may go [place] [facility] [place], advised on boarding and long wait times.     Encounter Diagnosis   Name Primary?    Right oculomotor nerve palsy Yes

## 2025-04-14 NOTE — TELEPHONE ENCOUNTER
Patient reports she has been having double vision since last Thursday and saw her optometrist today who recommended she get a MRI. She attached the letter to her portal. She is asking if this can wait until tomorrow when she can talk to her PCP or should she go to ER. I triaged her symptoms of double vision and the dispo is go to ER/UC now or pcp triage. Sent secure chat to Augie and chart reviewed by Dr. Beth. She advised...it is concerning for a right third nerve palsy. which can be concerning for aneurysm or stroke and needs to be evaluated. I would advise to ED. Updated patient. Instructed to call back with additional questions or worsening of symptoms. Patient verbalized understanding.     Reason for Disposition   Double vision    Additional Information   Negative: Complete loss of vision in one or both eyes   Negative: SEVERE eye pain   Negative: SEVERE headache    Protocols used: Vision Loss or Change-A-AH

## 2025-04-15 ENCOUNTER — PATIENT OUTREACH (OUTPATIENT)
Facility: OTHER | Age: 60
End: 2025-04-15
Payer: COMMERCIAL

## 2025-04-15 ENCOUNTER — TELEPHONE (OUTPATIENT)
Dept: OPHTHALMOLOGY | Facility: CLINIC | Age: 60
End: 2025-04-15
Payer: COMMERCIAL

## 2025-04-15 VITALS
OXYGEN SATURATION: 98 % | HEART RATE: 61 BPM | RESPIRATION RATE: 15 BRPM | DIASTOLIC BLOOD PRESSURE: 84 MMHG | SYSTOLIC BLOOD PRESSURE: 122 MMHG | TEMPERATURE: 98 F | BODY MASS INDEX: 26.98 KG/M2 | WEIGHT: 158 LBS | HEIGHT: 64 IN

## 2025-04-15 LAB
CRP SERPL-MCNC: 2.5 MG/L
ERYTHROCYTE [SEDIMENTATION RATE] IN BLOOD BY PHOTOMETRIC METHOD: 14 MM/HR

## 2025-04-15 PROCEDURE — 85652 RBC SED RATE AUTOMATED: CPT | Performed by: STUDENT IN AN ORGANIZED HEALTH CARE EDUCATION/TRAINING PROGRAM

## 2025-04-15 PROCEDURE — 86140 C-REACTIVE PROTEIN: CPT | Performed by: STUDENT IN AN ORGANIZED HEALTH CARE EDUCATION/TRAINING PROGRAM

## 2025-04-15 NOTE — PROGRESS NOTES
Patient was seen in the ED on 4/14/25. The ED After Visit Summary instructs the patient to follow up with primary care and ophthalmology. A follow-up call was made to assess additional needs, but there was no answer. A voicemail was left requesting a return call. Assistance will be provided as needed if the patient responds.

## 2025-04-15 NOTE — TELEPHONE ENCOUNTER
----- Message from Med Assistant Hodgson sent at 4/15/2025 11:27 AM CDT -----  Pt has a urgent referralVertical diplopia [H53.2]

## 2025-04-15 NOTE — DISCHARGE INSTRUCTIONS
You were seen today for double vision.  Your imaging today was reassuring.  You were evaluated by ophthalmology.  They would like you to follow up in strabismus clinic in 3 months.  They will call you to schedule follow up for this.  They are okay for you to patch your eye to help with the symptoms.  You can patch either eye.  Please return to the emergency department for any worsening double vision, vision loss or changes in your vision, new severe headache, new weakness or numbness, difficulty speaking or any other neurologic symptoms like we discussed.

## 2025-04-15 NOTE — FIRST PROVIDER EVALUATION
"Medical screening examination initiated.  I have conducted a focused provider triage encounter, findings are as follows:    Brief history of present illness: 58 y/o WF presents for concerns regarding  CN 3 palsy identified by Optometrist. C/o double vision for 4 days    Vitals:    04/14/25 1907   BP: (!) 154/67   Pulse: 81   Resp: 16   Temp: 98.2 °F (36.8 °C)   SpO2: 98%   Weight: 71.7 kg (158 lb)   Height: 5' 4" (1.626 m)       Pertinent physical exam:  evidence of visual disturbance, otw    Brief workup plan:  labs, imaging    Preliminary workup initiated; this workup will be continued and followed by the physician or advanced practice provider that is assigned to the patient when roomed.  "

## 2025-04-15 NOTE — PROGRESS NOTES
I received this patient in changeover.  Briefly, patient here for vertical diplopia.  Symptoms only occur with both eyes open.  She saw optometry and they were concerned for possible cranial nerve 3 palsy.  No obvious cranial nerve deficits on exam.  Might have some mild horizontal nystagmus.  She otherwise has a normal neurologic exam.  CTA and MRI head and orbits without any acute findings.  Labs and imaging reassuring.  I was asked to follow up ophthalmology recommendations.    Ophthalmology evaluated the patient.  They feel she is stable for discharge.  They feel that this is likely a mild cranial nerve for palsy or decompensated tropia.  They would like her to follow up in strabismus clinic in 3 months.  They will arrange follow up.  She is okay to patch either eye to help with symptoms.  Although they do not feel like this is GCA, they recommend checking inflammatory markers prior to discharge and if negative, patient can be discharged home.    Inflammatory markers reviewed and within normal limits.  Patient is stable for discharge home.  Return precautions discussed.  Patient was counseled on follow up.

## 2025-04-15 NOTE — ED TRIAGE NOTES
Grace Beltran, a 59 y.o. female presents to the ED w/ complaint of seeing double vision when she uses both of her eyes since Thursday. Pt denies pain or irritation. Pt states seeing her optometrist today and concerned for an issue with her optic nerve    Triage note:  Chief Complaint   Patient presents with    Eye Problem     Double vision since thur referred by optho.      Review of patient's allergies indicates:   Allergen Reactions    Codeine      Other reaction(s): Nausea    Hydrocodone      Other reaction(s): Vomiting  Other reaction(s): Nausea    Promethazine      Other reaction(s): Vomiting  Other reaction(s): Nausea     Past Medical History:   Diagnosis Date    Acne     Dermatologist prescribes spironolactone     Anxiety     celexa 40    Basal cell carcinoma     Brachioradial pruritus 08/11/2022    Chronic constipation 05/24/2017    History of patellar fracture 07/17/2019    L, slipped at home    Hot flashes due to menopause 05/24/2017    Lumbar scoliosis     Osteoarthritis of spine with radiculopathy, cervical region 08/11/2022    CT 2014, pruritis    Other osteoporosis without current pathological fracture 05/24/2017    Reclast with Rheum, ankle & patella fracture, mom with dx    Primary osteoarthritis of left hip 05/24/2017    Dr Garay, dry needling at [facility] helping, MRI 2016, Injection didn't help much    Right elbow pain 07/20/2017    dry needling at [facility] helping    Vitamin D deficiency 08/19/2019    Dr Blas started 50,000 q 7d in August 2019

## 2025-04-15 NOTE — CONSULTS
"Consultation Report  Ophthalmology Service    Date: 04/15/2025    Reason for Consult: "diplopia"     History of Present Illness: Grace Beltran is a 59 y.o. female with POHx of glasses wearing who presented to [facility] with vertical diplopia since Thursday. Ophthalmology is being consulted to evaluate for diplopia.    Patient reports suddenly developing binocular vertical diplopia. Denies any other changes in vision, flashes, floaters, or curtain-veil in visual field ou. Denies any ocular discomfort ou.    POcularHx: Denies history of ocular problems. Patient denies wearing contact lenses. Patient denies previous trauma to the eye. Patient denies history of ocular surgeries.    Current eye gtts: Denies     Family Hx: Denies family history of glaucoma, macular degeneration, or blindness. family history includes Breast cancer (age of onset: 65) in her maternal aunt; Breast cancer (age of onset: 81) in her mother; Hyperlipidemia in her mother; Hypertension in her mother; Melanoma in her maternal grandfather; Osteoporosis in her mother; Skin cancer in her father.     PMHx:  has a past medical history of Acne, Anxiety, Basal cell carcinoma, Brachioradial pruritus (08/11/2022), Chronic constipation (05/24/2017), History of patellar fracture (07/17/2019), Hot flashes due to menopause (05/24/2017), Lumbar scoliosis, Osteoarthritis of spine with radiculopathy, cervical region (08/11/2022), Other osteoporosis without current pathological fracture (05/24/2017), Primary osteoarthritis of left hip (05/24/2017), Right elbow pain (07/20/2017), and Vitamin D deficiency (08/19/2019).     PSurgHx:  has a past surgical history that includes right hip surgery; Colonoscopy (N/A, 04/08/2019); Colonoscopy (N/A, 06/13/2019); and Hip replacement arthroplasty (Right, 12/19/2024).     Home Medications: Reviewed    Allergies: is allergic to codeine, hydrocodone, and promethazine.     Social:  reports that she has never smoked. She has never " used smokeless tobacco. She reports current alcohol use of about 8.0 standard drinks of alcohol per week. She reports that she does not use drugs.     ROS: As per HPI    Ocular examination/Dilated fundus examination:  Base Eye Exam       Visual Acuity (Snellen - Linear)         Right Left    Dist cc 20/20 -1 20/20      Correction: Glasses              Tonometry (Tonopen, 3:59 AM)         Right Left    Pressure 15 16              Pupils         Dark Light Shape React APD    Right 4 2 Round Brisk None    Left 4 2 Round Brisk None              Visual Fields         Right Left     Full Full              Extraocular Movement         Right Left     Full Full              Neuro/Psych       Oriented x3: Yes    Mood/Affect: Normal              Dilation       Both eyes: 1% Mydriacyl, 2.5% Phenylephrine @ 4:00 AM                  Strabismus Exam       Method: Alternate cover      Distance Near Near +3DS N Bifocals                      - - - - - -   - - - - - -                       - -  - -  XT  - -  - -            RHypoT            - - - - - -   - - - - - -                       Slit Lamp and Fundus Exam       External Exam         Right Left    External Normal Normal              Slit Lamp Exam         Right Left    Lids/Lashes Normal Normal    Conjunctiva/Sclera Pinguecula White and quiet    Cornea Clear Clear    Anterior Chamber Deep and formed Deep and formed    Iris Round and reactive Round and reactive    Lens Clear Clear    Anterior Vitreous Normal Normal              Fundus Exam         Right Left    Disc Normal Normal    C/D Ratio 0.1 0.1    Macula Normal Normal    Vessels Tortuous and attenuated Tortuous and attenuated    Periphery Normal Normal                  MRI head and orbits w/ and w/o contrast (4/14/25)  No evidence of acute intracranial or intraorbital pathology.  Findings suggesting chronic small vessel ischemic change.    CTA head and neck (4/14/25)  No large vessel occlusion, hemodynamically significant  stenosis, or aneurysm.  No intracranial hemorrhage or major vascular distribution infarct.  MRI may be obtained for further evaluation, as clinically warranted.       Assessment/Plan:     1. Vertical diplopia  - Binocular vertical diplopia x 5 days, does not worsen throughout the day  - Patient reports it improves with left gaze, right head tilt, chin down position  - Patient with small XT and right hypoT  - EOM are full. Patient does not show appreciable torsion of the eyes when looking down. No ptosis observed. No pupil abnormality noted.   - Ddx remains broad. Could be a subtle L CN4 palsy (but full EOM) vs decompensated tropias. Normal MRI and CTA head and neck. Patient with normal HgbA1c, mildly elevated cholesterol. Patient denies history of HTN, but does have tortuous and attenuated vessels OU. Negative ROS otherwise for GCA, but patient >55. Normal TSH.     Recommendations  - Please obtain ESR CRP  - Patient can patch eye with pirate patch for comfort    Will set up follow-up in strabismus clinic. Message sent to schedulers    Patient's Best Contact Number: [phone]    Wilfred Brown MD  [employer] [facility]hthal[facility] PGY2  04/15/2025  4:00 AM

## 2025-04-15 NOTE — ED PROVIDER NOTES
Chief Complaint   Eye Problem (Double vision since thur referred by optho. )      History Of Present Illness   Grace Beltran is a 59 y.o. female with a PMHx including osteoporosis  presenting with vision change. Patient states that for the past 4 days, she has been having double vision.  States that her double vision is vertical with objects stacked on top of each other.  States it has been constant for the past 4 days.  She reports she reports no loss of vision.  She reports no facial droop, slurred speech, confusion, weakness or numbness.  Saw her optometrist earlier today while he was concerned about possible cranial nerve 3 palsy and advised to come to emergency room for MRI.    Independent Historian: Yes  Other Historian or Collateral: Chart review  Interpretor: No      Review of patient's allergies indicates:   Allergen Reactions    Codeine      Other reaction(s): Nausea    Hydrocodone      Other reaction(s): Vomiting  Other reaction(s): Nausea    Promethazine      Other reaction(s): Vomiting  Other reaction(s): Nausea       Medications Ordered Prior to Encounter[1]    Past History   As per HPI and below:  Past Medical History:   Diagnosis Date    Acne     Dermatologist prescribes spironolactone     Anxiety     celexa 40    Basal cell carcinoma     Brachioradial pruritus 08/11/2022    Chronic constipation 05/24/2017    History of patellar fracture 07/17/2019    L, slipped at home    Hot flashes due to menopause 05/24/2017    Lumbar scoliosis     Osteoarthritis of spine with radiculopathy, cervical region 08/11/2022    CT 2014, pruritis    Other osteoporosis without current pathological fracture 05/24/2017    Reclast with Rheum, ankle & patella fracture, mom with dx    Primary osteoarthritis of left hip 05/24/2017    Dr Garay, dry needling at [facility] helping, MRI 2016, Injection didn't help much    Right elbow pain 07/20/2017    dry needling at [facility] helping    Vitamin D deficiency 08/19/2019      Blas started 50,000 q 7d in August 2019     Past Surgical History:   Procedure Laterality Date    COLONOSCOPY N/A 04/08/2019    Procedure: COLONOSCOPY;  Surgeon: Rakesh Parmar MD;  Location: [facility] ENDO (4TH FLR);  Service: Endoscopy;  Laterality: N/A; significantly excessive looping of the colon    COLONOSCOPY N/A 06/13/2019    Procedure: COLONOSCOPY - Device-assisted;  Surgeon: Sravan Haji MD;  Location: [facility] ENDO (2ND FLR);  Service: Endoscopy;  Laterality: N/A;  Single-balloon scope; unremarkable, repeat in 10 years for screening    HIP REPLACEMENT ARTHROPLASTY Right 12/19/2024    right hip surgery      April 24, 2012       Social History     Socioeconomic History    Marital status: Single   Tobacco Use    Smoking status: Never    Smokeless tobacco: Never   Substance and Sexual Activity    Alcohol use: Yes     Alcohol/week: 8.0 standard drinks of alcohol     Types: 4 Standard drinks or equivalent, 4 Cans of beer per week    Drug use: No    Sexual activity: Yes     Partners: Female     Social Drivers of Health     Financial Resource Strain: Low Risk  (3/31/2025)    Overall Financial Resource Strain (CARDIA)     Difficulty of Paying Living Expenses: Not hard at all   Food Insecurity: No Food Insecurity (3/31/2025)    Hunger Vital Sign     Worried About Running Out of Food in the Last Year: Never true     Ran Out of Food in the Last Year: Never true   Transportation Needs: No Transportation Needs (3/31/2025)    PRAPARE - Transportation     Lack of Transportation (Medical): No     Lack of Transportation (Non-Medical): No   Physical Activity: Insufficiently Active (3/31/2025)    Exercise Vital Sign     Days of Exercise per Week: 5 days     Minutes of Exercise per Session: 20 min   Stress: No Stress Concern Present (3/31/2025)    [employer] [place] [employer] - Occupational Stress Questionnaire     Feeling of Stress : Only a little   Housing Stability: Low Risk  (3/31/2025)    Housing Stability  "Vital Sign     Unable to Pay for Housing in the Last Year: No     Number of Times Moved in the Last Year: 0     Homeless in the Last Year: No       Family History   Problem Relation Name Age of Onset    Hypertension Mother      Hyperlipidemia Mother      Osteoporosis Mother      Breast cancer Mother  81    Skin cancer Father      Breast cancer Maternal Aunt  65    Melanoma Maternal Grandfather      Colon cancer Neg Hx      Colon polyps Neg Hx      Crohn's disease Neg Hx      Ulcerative colitis Neg Hx      Stomach cancer Neg Hx      Esophageal cancer Neg Hx         Physical Exam     Vitals:    04/14/25 1907 04/15/25 0133 04/15/25 0149   BP: (!) 154/67 135/87    BP Location:  Left arm    Patient Position:  Sitting    Pulse: 81 66    Resp: 16 15    Temp: 98.2 °F (36.8 °C) 98 °F (36.7 °C)    TempSrc:  Oral    SpO2: 98% 97%    Weight: 71.7 kg (158 lb)  71.7 kg (158 lb)   Height: 5' 4" (1.626 m)  5' 4" (1.626 m)       Physical Exam  Vitals reviewed.   Constitutional:       General: She is not in acute distress.     Appearance: Normal appearance. She is not toxic-appearing.   HENT:      Head: Normocephalic and atraumatic.      Right Ear: External ear normal.      Left Ear: External ear normal.      Nose: No congestion.      Mouth/Throat:      Mouth: Mucous membranes are moist.   Eyes:      General: Lids are normal. Vision grossly intact. Gaze aligned appropriately. No scleral icterus.     Extraocular Movements: Extraocular movements intact.      Right eye: Normal extraocular motion.      Left eye: Normal extraocular motion.      Pupils: Pupils are equal, round, and reactive to light.      Visual Fields: Right eye visual fields normal and left eye visual fields normal.      Comments: Minimal bilateral horizontal nystagmus, fatigable, no vertical nystagmus.    Vertical diplopia with both eyes open, no diplopia when 1 eye is covered.  No gaze correction noted when 1 eye is covered.     Patient reports that her vertical " diplopia is improved when she tilts her head towards the right.    Cardiovascular:      Rate and Rhythm: Normal rate and regular rhythm.   Pulmonary:      Effort: No respiratory distress.      Breath sounds: No wheezing or rhonchi.   Abdominal:      General: There is no distension.      Palpations: Abdomen is soft.      Tenderness: There is no abdominal tenderness. There is no guarding.   Musculoskeletal:         General: No swelling or deformity.      Cervical back: No rigidity.   Skin:     General: Skin is warm and dry.      Capillary Refill: Capillary refill takes less than 2 seconds.   Neurological:      General: No focal deficit present.      Mental Status: She is alert and oriented to person, place, and time.             Results     Labs Reviewed   COMPREHENSIVE METABOLIC PANEL - Normal       Result Value    Sodium 143      Potassium 3.8      Chloride 104      CO2 25      Glucose 101      BUN 15      Creatinine 1.0      Calcium 9.5      Protein Total 7.8      Albumin 4.1      Bilirubin Total 0.3            AST 24      ALT 24      Anion Gap 14      eGFR >60     CBC WITH DIFFERENTIAL - Normal    WBC 5.28      RBC 4.79      HGB 13.8      HCT 42.6      MCV 89      MCH 28.8      MCHC 32.4      RDW 12.8      Platelet Count 318      MPV 10.1      Nucleated RBC 0      Neut % 55.1      Lymph % 35.6      Mono % 6.8      Eos % 1.9      Basophil % 0.4      Imm Grans % 0.2      Neut # 2.91      Lymph # 1.88      Mono # 0.36      Eos # 0.10      Baso # 0.02      Imm Grans # 0.01     TSH - Normal    TSH 1.992     CBC W/ AUTO DIFFERENTIAL    Narrative:     The following orders were created for panel order CBC auto differential.                  Procedure                               Abnormality         Status                                     ---------                               -----------         ------                                     CBC with Differential[[id number]]       Normal              Final result                                                  Please view results for these tests on the individual orders.       Imaging Results              MRI Head Orbits W/Wo Contrast (XPD) (Final result)  Result time 04/15/25 01:27:51      Final result by Alessio Clay MD (04/15/25 01:27:51)                   Impression:      No evidence of acute intracranial or intraorbital pathology.    Findings suggesting chronic small vessel ischemic change.    Electronically signed by resident: Kilo Morrison  Date:    04/15/2025  Time:    00:48    Electronically signed by: Alessio Clay  Date:    04/15/2025  Time:    01:27               Narrative:    EXAMINATION:  MRI HEAD-ORBITS W/WO CONTRAST (XPD)    CLINICAL HISTORY:  Diplopia;    TECHNIQUE:  Multiplanar multisequence MR imaging of the brain and orbits was performed before and after the administration of 8 mL Gadavist intravenous contrast.    COMPARISON:  CTA head and neck 04/14/2025    FINDINGS:  Brain:    Ventricles normal in size for age.  No hydrocephalus.    Mild patchy T2/FLAIR hyperintensity in the supratentorial white matter, nonspecific but most likely reflecting chronic small vessel ischemic changes. No recent or remote major vascular distribution infarct. No recent or remote hemorrhage.  No mass effect or midline shift.   No abnormal postcontrast parenchymal or leptomeningeal enhancement.    No extra-axial blood or fluid collections.    The T2 skull base flow voids are preserved.  Bone marrow signal intensity unremarkable.    Orbits:    The globes are unremarkable.  The optic nerves maintain normal morphology and signal without abnormal enhancement.  No intraorbital mass or inflammatory change identified.                                       CTA Head and Neck (xpd) (Final result)  Result time 04/14/25 22:55:12      Final result by Rickie Quintero MD (04/14/25 22:55:12)                   Impression:      No large vessel occlusion, hemodynamically significant  stenosis, or aneurysm.    No intracranial hemorrhage or major vascular distribution infarct.    MRI may be obtained for further evaluation, as clinically warranted.    Electronically signed by resident: Kilo Morrison  Date:    04/14/2025  Time:    22:23    Electronically signed by: Rickie Quintero MD  Date:    04/14/2025  Time:    22:55               Narrative:    EXAMINATION:  CTA HEAD AND NECK (XPD)    CLINICAL HISTORY:  Diplopia;    TECHNIQUE:  Axial CT images obtained throughout the region of the head before and after the administration of intravenous contrast.  CT angiogram was performed through the cervical and intracranial vasculature during the IV bolus administration of 75mL of Omnipaque 350.  Multiplanar MPR and MIP reformats were performed.    CT source data was analyzed using artificial intelligence software for detection of a large vessel occlusions (LVO) in order to enable computer assisted triage notification and aid clinical stroke decision making.    COMPARISON:  CT head 03/01/2021    FINDINGS:  The ventricles are normal in size without evidence of hydrocephalus.    Patchy hypoattenuation in the supratentorial white matter, nonspecific but most likely reflecting chronic microvascular ischemic changes. No recent or remote major vascular distribution infarct. No acute hemorrhage.  No mass effect or midline shift.    No extra-axial blood or fluid collections.    No abnormal optic nerve enhancement.    The cranium appears intact. Mastoid air cells and paranasal sinuses are essentially clear.    CTA:    Left-sided aortic arch with 2 branch vessels, normal caliber, and no significant atherosclerosis.  No significant stenosis at the major branch vessel origins.    The right common carotid artery is normal in caliber. No significant stenosis at the carotid bifurcation.The right internal carotid artery is normal in caliber.    The left common carotid artery is normal in caliber. No significant stenosis at the  carotid bifurcation.The left internal carotid artery is normal in caliber.    The right vertebral artery is normal in caliber.    The left vertebral artery is normal in caliber.    Basilar artery is within normal limits without focal abnormality.    The proximal anterior, middle, and posterior cerebral arteries are within normal limits.  No evidence of significant stenosis, focal occlusion, or intracranial aneurysm.    Additional CT findings: Lung apices are clear.  There is no lymphadenopathy.  Thyroid gland appears normal.                                        Initial MDM   Medical Decision Making  Patient was a 59-year-old woman presenting with vertical binocular diplopia.  Most concerning for a cranial nerve palsy including cranial nerves 3 and 4.  Workup to further evaluate for mass, ischemic process, aneurysm.  No evidence of infectious etiology on exam.  Very low suspicion for neuromuscular myasthenia gravis.     Amount and/or Complexity of Data Reviewed  Labs: ordered.  Radiology: ordered.    Risk  Prescription drug management.                     Medications Given / Interventions     Medications   iohexoL (OMNIPAQUE 350) injection 75 mL (75 mLs Intravenous Given 4/14/25 2141)   gadobutroL (GADAVIST) injection 8 mL (8 mLs Intravenous Given 4/14/25 2321)       Procedures     ED POCUS Performed: No    Reassessment and ED Course     ED Course as of 04/15/25 0238   Tue Apr 15, 2025   0238 CTA and MRI head and orbits without acute pathology noted.  Discussed with Ophthalmology who will evaluate in the ED. Signed out to oncoming provider pending ophthalmology recommendations [CH]      ED Course User Index  [CH] Haylee Moreno MD              Final diagnoses:  [H53.2] Vertical diplopia (Primary)                 Dispo                          [1]   No current facility-administered medications on file prior to encounter.     Current Outpatient Medications on File Prior to Encounter   Medication Sig Dispense  Refill    citalopram (CELEXA) 40 MG tablet Take 1 tablet (40 mg total) by mouth once daily. 90 tablet 3        Haylee Moreno MD  04/15/25 9914

## 2025-04-28 ENCOUNTER — HOSPITAL ENCOUNTER (OUTPATIENT)
Dept: RADIOLOGY | Facility: HOSPITAL | Age: 60
Discharge: HOME | End: 2025-04-28
Attending: INTERNAL MEDICINE
Payer: COMMERCIAL

## 2025-04-28 ENCOUNTER — OFFICE VISIT (OUTPATIENT)
Dept: INTERNAL MEDICINE | Facility: CLINIC | Age: 60
End: 2025-04-28
Payer: COMMERCIAL

## 2025-04-28 VITALS — BODY MASS INDEX: 26.98 KG/M2 | HEIGHT: 64 IN | WEIGHT: 158 LBS

## 2025-04-28 DIAGNOSIS — Z12.31 OTHER SCREENING MAMMOGRAM: ICD-10-CM

## 2025-04-28 DIAGNOSIS — H53.2 DIPLOPIA: Primary | ICD-10-CM

## 2025-04-28 DIAGNOSIS — Z00.00 ROUTINE GENERAL MEDICAL EXAMINATION AT A HEALTH CARE FACILITY: ICD-10-CM

## 2025-04-28 PROCEDURE — 98005 SYNCH AUDIO-VIDEO EST LOW 20: CPT | Mod: 95,,, | Performed by: INTERNAL MEDICINE

## 2025-04-28 PROCEDURE — G2211 COMPLEX E/M VISIT ADD ON: HCPCS | Mod: 95,,, | Performed by: INTERNAL MEDICINE

## 2025-04-28 PROCEDURE — 77063 BREAST TOMOSYNTHESIS BI: CPT | Mod: 26,,, | Performed by: RADIOLOGY

## 2025-04-28 PROCEDURE — 1160F RVW MEDS BY RX/DR IN RCRD: CPT | Mod: CPTII,95,, | Performed by: INTERNAL MEDICINE

## 2025-04-28 PROCEDURE — 77067 SCR MAMMO BI INCL CAD: CPT | Mod: 26,,, | Performed by: RADIOLOGY

## 2025-04-28 PROCEDURE — 77063 BREAST TOMOSYNTHESIS BI: CPT | Mod: TC

## 2025-04-28 PROCEDURE — 1159F MED LIST DOCD IN RCRD: CPT | Mod: CPTII,95,, | Performed by: INTERNAL MEDICINE

## 2025-04-28 PROCEDURE — 3044F HG A1C LEVEL LT 7.0%: CPT | Mod: CPTII,95,, | Performed by: INTERNAL MEDICINE

## 2025-04-28 NOTE — PROGRESS NOTES
The patient location is: [place]  The chief complaint leading to consultation is: Diplopia    Visit type: audiovisual    Face to Face time with patient: 15  35 minutes of total time spent on the encounter, which includes face to face time and non-face to face time preparing to see the patient (eg, review of tests), Obtaining and/or reviewing separately obtained history, Documenting clinical information in the electronic or other health record, Independently interpreting results (not separately reported) and communicating results to the patient/family/caregiver, or Care coordination (not separately reported).         Each patient to whom he or she provides medical services by telemedicine is:  (1) informed of the relationship between the physician and patient and the respective role of any other health care provider with respect to management of the patient; and (2) notified that he or she may decline to receive medical services by telemedicine and may withdraw from such care at any time.    Notes:        Subjective:      Patient ID: Grace Beltran is a 59 y.o. female.    Chief Complaint: No chief complaint on file.    Grace Beltran is a 59 y.o. female with chronic conditions significant for anxiety, hx of osteoporosis s/p 1 round of reclast, OA, recent hip replacement 12/2024.     Diplopia: Presents today for diplopia of 3 weeks duration. She reports that she has bene having a vertical diplopia. The diplopia does improve with patching her eye. She was seen by her optometrist. She ended up going to the ED with worsening symptoms on 4/14/2025. MRI obtained in the ED did not reveal acute intracranial or orbital pathology. She was also evaluated by ophthalmology on call in the ED.       Visit today is associated with current or anticipated ongoing medical care related to this patient's single serious condition/complex condition of anxiety, hx of osteoporosis s/p 1 round of reclast, OA,  hx hip replacement. The  "patient will return to see me as these issues will be followed longitudinally.    Denies any chest pain, shortness of breath, nausea vomiting constipation diarrhea, blood in stool, heartburn    Review of Systems   HENT:  Negative for hearing loss.    Eyes:  Negative for discharge.   Respiratory:  Negative for wheezing.    Cardiovascular:  Negative for chest pain and palpitations.   Gastrointestinal:  Negative for blood in stool, constipation, diarrhea and vomiting.   Genitourinary:  Negative for dysuria and hematuria.   Musculoskeletal:  Negative for neck pain.   Neurological:  Negative for weakness and headaches.   Endo/Heme/Allergies:  Negative for polydipsia.       Current Medications[1]    Lab Results   Component Value Date    HGBA1C 4.9 01/30/2025    HGBA1C 5.4 11/17/2023     No results found for: "MICALBCREAT"  Lab Results   Component Value Date    LDLCALC 133.4 01/30/2025    LDLCALC 141.8 11/17/2023    CHOL 216 (H) 01/30/2025    HDL 67 01/30/2025    TRIG 78 01/30/2025       Lab Results   Component Value Date     04/14/2025    K 3.8 04/14/2025     04/14/2025    CO2 25 04/14/2025    GLU 85 01/30/2025    BUN 15 04/14/2025    CREATININE 1.0 04/14/2025    CALCIUM 9.5 04/14/2025    PROT 7.0 01/30/2025    ALBUMIN 4.1 04/14/2025    BILITOT 0.3 04/14/2025    ALKPHOS 111 04/14/2025    AST 24 04/14/2025    ALT 24 04/14/2025    ANIONGAP 14 04/14/2025    ESTGFRAFRICA >60.0 02/15/2022    EGFRNONAA >60.0 02/15/2022    WBC 5.28 04/14/2025    HGB 13.8 04/14/2025    HGB 12.8 01/30/2025    HCT 42.6 04/14/2025    MCV 89 04/14/2025     04/14/2025    TSH 1.992 04/14/2025    HEPCAB Negative 05/18/2017       Lab Results   Component Value Date    FSH 6.2 01/12/2004    PROGESTERONE 16.3 (H) 02/25/2004    ESTRADIOL 110 01/12/2004    DNUVKZXO14KT 23 (L) 01/20/2023    WUFGLZKN28WJ 41 03/03/2022         Past Medical History:   Diagnosis Date    Acne     Dermatologist prescribes spironolactone     Anxiety     celexa 40 "    Basal cell carcinoma     Brachioradial pruritus 08/11/2022    Chronic constipation 05/24/2017    History of patellar fracture 07/17/2019    L, slipped at home    Hot flashes due to menopause 05/24/2017    Lumbar scoliosis     Osteoarthritis of spine with radiculopathy, cervical region 08/11/2022    CT 2014, pruritis    Other osteoporosis without current pathological fracture 05/24/2017    Reclast with Rheum, ankle & patella fracture, mom with dx    Primary osteoarthritis of left hip 05/24/2017    Dr Garay, dry needling at [facility] helping, MRI 2016, Injection didn't help much    Right elbow pain 07/20/2017    [facility]    Vitamin D deficiency 08/19/2019    Dr Blas started 50,000 q 7d in August 2019     Past Surgical History:   Procedure Laterality Date    COLONOSCOPY N/A 04/08/2019    Procedure: COLONOSCOPY;  Surgeon: Rkaesh Parmar MD;  Location: [facility] ENDO (4TH FLR);  Service: Endoscopy;  Laterality: N/A; significantly excessive looping of the colon    COLONOSCOPY N/A 06/13/2019    Procedure: COLONOSCOPY - Device-assisted;  Surgeon: Sravan Haji MD;  Location: [facility] (2ND FLR);  Service: Endoscopy;  Laterality: N/A;  Single-balloon scope; unremarkable, repeat in 10 years for screening    HIP REPLACEMENT ARTHROPLASTY Right 12/19/2024    right hip surgery      April 24, 2012     Social History     Social History Narrative    Not on file     Family History   Problem Relation Name Age of Onset    Hypertension Mother      Hyperlipidemia Mother      Osteoporosis Mother      Breast cancer Mother  81    Skin cancer Father      Breast cancer Maternal Aunt  65    Melanoma Maternal Grandfather      Colon cancer Neg Hx      Colon polyps Neg Hx      Crohn's disease Neg Hx      Ulcerative colitis Neg Hx      Stomach cancer Neg Hx      Esophageal cancer Neg Hx       There were no vitals filed for this visit.  Objective:   Physical Exam  Constitutional:       Appearance: Normal appearance.    HENT:      Head: Normocephalic.      Nose: Nose normal.   Neurological:      Mental Status: She is alert and oriented to person, place, and time. Mental status is at baseline.   Psychiatric:         Mood and Affect: Mood normal.         Behavior: Behavior normal.       Assessment:     1. Diplopia      Plan:     Orders Placed This Encounter    Ambulatory referral/consult to Neurology   - MRI reviewed  - with normal MRI, suspect muscular etiology for this patient's diplopia. She does have an appnt with adult strabismus specialist coming up on end of May. Would recommend keeping this appointment for further recommendations, including surgical correction.   - MG considered as diagnosis but unlikely given the symptomatology.  - consider neurology follow up if strabismus workup is unrevealing    - patient was told to follow up immediately or go to the ED with worsening vision changes with severe headache.     There are no Patient Instructions on file for this visit.  Tests to Keep You Healthy    Mammogram: SCHEDULED  Colon Cancer Screening: Met on 6/13/2019  Cervical Cancer Screening: Met on 11/2/2021  Answers submitted by the patient for this visit:  Review of Systems Questionnaire (Submitted on 4/28/2025)  activity change: No  unexpected weight change: No  rhinorrhea: No  trouble swallowing: No  visual disturbance: Yes  chest tightness: No  polyuria: No  difficulty urinating: No  menstrual problem: No  joint swelling: No  arthralgias: No  confusion: No  dysphoric mood: No         [1]   Current Outpatient Medications:     citalopram (CELEXA) 40 MG tablet, Take 1 tablet (40 mg total) by mouth once daily., Disp: 90 tablet, Rfl: 3

## 2025-04-29 NOTE — PROGRESS NOTES
Your mammogram was normal.    Remember, this is only a screening test and does not overrule a palpable lump.  Please let me or your gynecologist know if you feel something abnormal.    Repeat mammogram in 1 year.    But also, because of the family history and the density in your breast, your lifetime risk score is 21.78%.  I recommend everyone whose risk is > 20% go talk to the high risk breast clinic (located within the surgery department).  This is not a referral for breast surgery!  Just to talk to someone about your risk.  They help determine your risk more extensively.  People generally are nervous to go and then feel better after they talk to the people there.  I'm putting in a referral, let me know if you have questions in regards to this.

## 2025-05-21 ENCOUNTER — OFFICE VISIT (OUTPATIENT)
Dept: URGENT CARE | Facility: CLINIC | Age: 60
End: 2025-05-21
Payer: COMMERCIAL

## 2025-05-21 VITALS
RESPIRATION RATE: 16 BRPM | BODY MASS INDEX: 26.98 KG/M2 | HEART RATE: 65 BPM | WEIGHT: 158 LBS | HEIGHT: 64 IN | OXYGEN SATURATION: 98 % | DIASTOLIC BLOOD PRESSURE: 86 MMHG | SYSTOLIC BLOOD PRESSURE: 124 MMHG | TEMPERATURE: 99 F

## 2025-05-21 DIAGNOSIS — N39.0 ACUTE UTI: Primary | ICD-10-CM

## 2025-05-21 LAB
BILIRUBIN, UA POC OHS: NEGATIVE
BLOOD, UA POC OHS: ABNORMAL
CLARITY, UA POC OHS: CLEAR
COLOR, UA POC OHS: YELLOW
GLUCOSE, UA POC OHS: NEGATIVE
KETONES, UA POC OHS: NEGATIVE
LEUKOCYTES, UA POC OHS: ABNORMAL
NITRITE, UA POC OHS: NEGATIVE
PH, UA POC OHS: 6
PROTEIN, UA POC OHS: NEGATIVE
SPECIFIC GRAVITY, UA POC OHS: <=1.005
UROBILINOGEN, UA POC OHS: 0.2

## 2025-05-21 PROCEDURE — 81003 URINALYSIS AUTO W/O SCOPE: CPT | Mod: QW,S$GLB,, | Performed by: PHYSICIAN ASSISTANT

## 2025-05-21 PROCEDURE — 99213 OFFICE O/P EST LOW 20 MIN: CPT | Mod: S$GLB,,, | Performed by: PHYSICIAN ASSISTANT

## 2025-05-21 RX ORDER — NITROFURANTOIN 25; 75 MG/1; MG/1
100 CAPSULE ORAL 2 TIMES DAILY
Qty: 10 CAPSULE | Refills: 0 | Status: SHIPPED | OUTPATIENT
Start: 2025-05-21 | End: 2025-05-26

## 2025-05-21 NOTE — PROGRESS NOTES
"Subjective:      Patient ID: Grace Beltran is a 59 y.o. female.    Vitals:  height is 5' 4" (1.626 m) and weight is 71.7 kg (158 lb). Her oral temperature is 98.5 °F (36.9 °C). Her blood pressure is 124/86 and her pulse is 65. Her respiration is 16 and oxygen saturation is 98%.     Chief Complaint: Dysuria    Patient reports that urinary symptoms started last night.Patient does reports urgency to urinate has been going on for a while.Patient took nothing for her symptoms.  Patient states that she has had some urinary urgency over the past several weeks but they resolved got worse last night.    Dysuria   This is a new problem. The current episode started yesterday. The problem has been gradually worsening. The quality of the pain is described as burning. The patient is experiencing no pain. There has been no fever. There is No history of pyelonephritis. Associated symptoms include frequency, hematuria, hesitancy and urgency. Pertinent negatives include no chills, nausea, vomiting, constipation or rash. She has tried nothing for the symptoms. There is no history of kidney stones or recurrent UTIs.       Constitution: Negative for appetite change, chills, fatigue and fever.   Cardiovascular:  Negative for chest pain.   Respiratory:  Negative for shortness of breath.    Gastrointestinal:  Positive for abdominal pain. Negative for nausea, vomiting, constipation and diarrhea.   Genitourinary:  Positive for dysuria, frequency, urgency and hematuria. Negative for vaginal discharge and vaginal bleeding.   Musculoskeletal:  Negative for back pain and muscle ache.   Skin:  Negative for rash.      Past Medical History:   Diagnosis Date    Acne     Dermatologist prescribes spironolactone     Anxiety     celexa 40    Basal cell carcinoma     Brachioradial pruritus 08/11/2022    Chronic constipation 05/24/2017    History of patellar fracture 07/17/2019    L, slipped at home    Hot flashes due to menopause 05/24/2017    " Lumbar scoliosis     Osteoarthritis of spine with radiculopathy, cervical region 08/11/2022    CT 2014, pruritis    Other osteoporosis without current pathological fracture 05/24/2017    Reclast with Rheum, ankle & patella fracture, mom with dx    Primary osteoarthritis of left hip 05/24/2017    Dr Garay, dry needling at [facility] helping, MRI 2016, Injection didn't help much    Right elbow pain 07/20/2017    dry needling at [facility] helping    Vitamin D deficiency 08/19/2019    Dr Blas started 50,000 q 7d in August 2019       Past Surgical History:   Procedure Laterality Date    COLONOSCOPY N/A 04/08/2019    Procedure: COLONOSCOPY;  Surgeon: Rakesh Parmar MD;  Location: [facility] ENDO (4TH FLR);  Service: Endoscopy;  Laterality: N/A; significantly excessive looping of the colon    COLONOSCOPY N/A 06/13/2019    Procedure: COLONOSCOPY - Device-assisted;  Surgeon: Sravan Haji MD;  Location: [facility] ENDO (2ND FLR);  Service: Endoscopy;  Laterality: N/A;  Single-balloon scope; unremarkable, repeat in 10 years for screening    HIP REPLACEMENT ARTHROPLASTY Right 12/19/2024    right hip surgery      April 24, 2012       Family History   Problem Relation Name Age of Onset    Hypertension Mother      Hyperlipidemia Mother      Osteoporosis Mother      Breast cancer Mother  81    Skin cancer Father      Breast cancer Maternal Aunt  65    Melanoma Maternal Grandfather      Colon cancer Neg Hx      Colon polyps Neg Hx      Crohn's disease Neg Hx      Ulcerative colitis Neg Hx      Stomach cancer Neg Hx      Esophageal cancer Neg Hx         Social History     Socioeconomic History    Marital status: Single   Tobacco Use    Smoking status: Never    Smokeless tobacco: Never   Substance and Sexual Activity    Alcohol use: Yes     Alcohol/week: 8.0 standard drinks of alcohol     Types: 4 Standard drinks or equivalent, 4 Cans of beer per week    Drug use: No    Sexual activity: Yes     Partners: Female     Social Drivers of  Health     Financial Resource Strain: Low Risk  (3/31/2025)    Overall Financial Resource Strain (CARDIA)     Difficulty of Paying Living Expenses: Not hard at all   Food Insecurity: No Food Insecurity (3/31/2025)    Hunger Vital Sign     Worried About Running Out of Food in the Last Year: Never true     Ran Out of Food in the Last Year: Never true   Transportation Needs: No Transportation Needs (3/31/2025)    PRAPARE - Transportation     Lack of Transportation (Medical): No     Lack of Transportation (Non-Medical): No   Physical Activity: Insufficiently Active (3/31/2025)    Exercise Vital Sign     Days of Exercise per Week: 5 days     Minutes of Exercise per Session: 20 min   Stress: No Stress Concern Present (3/31/2025)    [employer] - Occupational Stress Questionnaire     Feeling of Stress : Only a little   Housing Stability: Low Risk  (3/31/2025)    Housing Stability Vital Sign     Unable to Pay for Housing in the Last Year: No     Number of Times Moved in the Last Year: 0     Homeless in the Last Year: No       Current Medications[1]    Review of patient's allergies indicates:   Allergen Reactions    Codeine      Other reaction(s): Nausea    Hydrocodone      Other reaction(s): Vomiting  Other reaction(s): Nausea    Promethazine      Other reaction(s): Vomiting  Other reaction(s): Nausea       Objective:     Physical Exam   Constitutional:  Non-toxic appearance. She does not appear ill. No distress.   Eyes: Conjunctivae are normal. No scleral icterus.   Cardiovascular: Normal rate, regular rhythm and normal heart sounds.   No murmur heard.Exam reveals no gallop and no friction rub.   Pulmonary/Chest: Effort normal and breath sounds normal. No stridor. No respiratory distress. She has no wheezes. She has no rhonchi. She has no rales.   Abdominal: Normal appearance and bowel sounds are normal. She exhibits no distension and no mass. Soft. flat abdomen There is no abdominal  tenderness. There is no rebound, no guarding, no tenderness at McBurney's point, negative Díaz's sign, no left CVA tenderness, negative Rovsing's sign and no right CVA tenderness. No hernia.   Neurological: She is alert.   Skin: Skin is warm, dry, not diaphoretic and no rash.   Psychiatric: Her behavior is normal. Mood normal.   Nursing note and vitals reviewed.    Results for orders placed or performed in visit on 05/21/25   POCT Urinalysis(Instrument)    Collection Time: 05/21/25 12:32 PM   Result Value Ref Range    Color, POC UA Yellow Yellow, Straw, Colorless    Clarity, POC UA Clear Clear    Glucose, POC UA Negative Negative    Bilirubin, POC UA Negative Negative    Ketones, POC UA Negative Negative    Spec Grav POC UA <=1.005 1.005 - 1.030    Blood, POC UA Small (A) Negative    pH, POC UA 6.0 5.0 - 8.0    Protein, POC UA Negative Negative    Urobilinogen, POC UA 0.2 <=1.0    Nitrite, POC UA Negative Negative    WBC, POC UA Small (A) Negative       Assessment:     1. Acute UTI        Plan:       Acute UTI  -     POCT Urinalysis(Instrument)  -     nitrofurantoin, macrocrystal-monohydrate, (MACROBID) 100 MG capsule; Take 1 capsule (100 mg total) by mouth 2 (two) times daily. for 5 days  Dispense: 10 capsule; Refill: 0      Results reviewed  I have reviewed the patient chart and pertinent past imaging/labs.         Patient Instructions   If your condition worsens or fails to improve we recommend that you receive another evaluation at the ER immediately or contact your PCP to discuss your concerns or return here. You must understand that you've received an urgent care treatment only and that you may be released before all your medical problems are known or treated. You the patient will arrange for followup care as instructed.       Take antibiotic as prescribed with food.    Cranberry juice may help. Get the 100% cranberry juice and mix 4 oz of juice with 4 oz of water and drink this 8 oz glass of liquid once a  day.   Increase water intake to at least 8-10 glasses/day.    You can use over-the-counter  AZO as directed for bladder irritation if you need it.  This will turn your urine orange wear a panty liner so it does not stain your unaware avoid contact use so it does not stay in your context.  Avoid caffeine, alcohol, or spicy foods as they irritate the bladder.      If symptoms do not improve in 2-3 days please return for evaluation                  [1]   Current Outpatient Medications   Medication Sig Dispense Refill    citalopram (CELEXA) 40 MG tablet Take 1 tablet (40 mg total) by mouth once daily. 90 tablet 3    nitrofurantoin, macrocrystal-monohydrate, (MACROBID) 100 MG capsule Take 1 capsule (100 mg total) by mouth 2 (two) times daily. for 5 days 10 capsule 0     No current facility-administered medications for this visit.

## 2025-05-21 NOTE — PATIENT INSTRUCTIONS
If your condition worsens or fails to improve we recommend that you receive another evaluation at the ER immediately or contact your PCP to discuss your concerns or return here. You must understand that you've received an urgent care treatment only and that you may be released before all your medical problems are known or treated. You the patient will arrange for followup care as instructed.       Take antibiotic as prescribed with food.    Cranberry juice may help. Get the 100% cranberry juice and mix 4 oz of juice with 4 oz of water and drink this 8 oz glass of liquid once a day.   Increase water intake to at least 8-10 glasses/day.    You can use over-the-counter  AZO as directed for bladder irritation if you need it.  This will turn your urine orange wear a panty liner so it does not stain your unaware avoid contact use so it does not stay in your context.  Avoid caffeine, alcohol, or spicy foods as they irritate the bladder.      If symptoms do not improve in 2-3 days please return for evaluation

## 2025-05-26 ENCOUNTER — OFFICE VISIT (OUTPATIENT)
Dept: OPHTHALMOLOGY | Facility: CLINIC | Age: 60
End: 2025-05-26
Payer: COMMERCIAL

## 2025-05-26 DIAGNOSIS — H49.12 LEFT-SIDED FOURTH CRANIAL NERVE PALSY: Primary | ICD-10-CM

## 2025-05-26 DIAGNOSIS — H53.2 VERTICAL DIPLOPIA: ICD-10-CM

## 2025-05-26 PROCEDURE — 1160F RVW MEDS BY RX/DR IN RCRD: CPT | Mod: CPTII,S$GLB,, | Performed by: STUDENT IN AN ORGANIZED HEALTH CARE EDUCATION/TRAINING PROGRAM

## 2025-05-26 PROCEDURE — 92060 SENSORIMOTOR EXAMINATION: CPT | Mod: S$GLB,,, | Performed by: STUDENT IN AN ORGANIZED HEALTH CARE EDUCATION/TRAINING PROGRAM

## 2025-05-26 PROCEDURE — 1159F MED LIST DOCD IN RCRD: CPT | Mod: CPTII,S$GLB,, | Performed by: STUDENT IN AN ORGANIZED HEALTH CARE EDUCATION/TRAINING PROGRAM

## 2025-05-26 PROCEDURE — 3044F HG A1C LEVEL LT 7.0%: CPT | Mod: CPTII,S$GLB,, | Performed by: STUDENT IN AN ORGANIZED HEALTH CARE EDUCATION/TRAINING PROGRAM

## 2025-05-26 PROCEDURE — 99214 OFFICE O/P EST MOD 30 MIN: CPT | Mod: S$GLB,,, | Performed by: STUDENT IN AN ORGANIZED HEALTH CARE EDUCATION/TRAINING PROGRAM

## 2025-05-26 PROCEDURE — 99999 PR PBB SHADOW E&M-EST. PATIENT-LVL III: CPT | Mod: PBBFAC,,, | Performed by: STUDENT IN AN ORGANIZED HEALTH CARE EDUCATION/TRAINING PROGRAM

## 2025-05-27 NOTE — PROGRESS NOTES
HPI    Pt. Presents for evaluation of diplopia.     Ms. Beltran reports noticing trouble seeing for a few weeks earlier this   Spring. Eventually, she realized the blurry vision was double vision, and   she called her PCP who recommended she go to the Emergency Room. She was   seen at [facility] on 4/14/25. There, she was noted to have vertical diplopia   that improved with a right head tilt. She underwent MRI Brain and CTA   Head/Neck that were unremarkable. She was seen by the ophthalmology   resident on call who felt findings could be secondary to a left sided 4th   nerve palsy or some sort of decompensated phoria.   Since then, she wore a patch for a little while but she felt things   gradually improved. She was given prism glasses (with a total strength of   4 prism diopters (2 BU OD, 2 BD OS) which helped initially but now give   her double vision. She still has double vision now, especially worse with   prolonged work. She denies any childhood strabismus.     Denies FOL, Floaters, Pain.   Eye meds: None      Last edited by Bienvenido Bhatia MD on 5/26/2025  8:47 PM.        ROS    Negative for: Constitutional, Gastrointestinal, Neurological, Skin,   Genitourinary, Musculoskeletal, HENT, Endocrine, Cardiovascular, Eyes,   Respiratory, Psychiatric, Allergic/Imm, Heme/Lymph  Last edited by Bienvenido Bhatia MD on 5/26/2025  8:47 PM.        Assessment /Plan     For exam results, see Encounter Report.    Left-sided fourth cranial nerve palsy    Vertical diplopia  -     Ambulatory referral/consult to Ophthalmology        Problem List Items Addressed This Visit          Ophtho    Left-sided fourth cranial nerve palsy - Primary    Current Assessment & Plan   Patient with hx of blurry vision for a few weeks, then suddenly noticed vertical diplopia on 4/14/25  Was seen in ED and MRI Head/Orbits and CTA Head/Neck were unremarkable  Since then, double vision has improved but still occurs if very tired or after prolonged  periods of work  She was given prism glasses with total of 4 prism diopter (2 BU OD, and 2 BD OS) power that helped initially but now give her diplopia    5/26/25: Has very small left hyperphoria that is worse in right gaze and left tilt and with mild IOOA on left side  Plan:   Findings consistent with left sided fourth nerve palsy  She has very small vertical fusion amplitudes and does not tolerate any degree of prism offset  Her hx of normal MRI, interval improvement, and being given prism glasses that helped initially but now are too strong, are most consistent with a microvascular cause of a fourth nerve palsy, though no classic risk factors of HTN, T2DM, JADE, etc.  Recommend observation to monitor for continued spontaneous improvement  RTC 2-3 months          Vertical diplopia      Bienvenido Bhatia MD  Pediatric Ophthalmology and Adult Strabismus  [facility]      Time spent on this encounter: 45 minutes. This includes face to face time and non-face to face time preparing to see the patient (eg, review of tests, records, etc.), obtaining and/or reviewing separately obtained history, documenting clinical information in the electronic or other health record, examining patient, independently interpreting results and communicating results to the patient/family/caregiver, or care coordinator.

## 2025-05-27 NOTE — ASSESSMENT & PLAN NOTE
Patient with hx of blurry vision for a few weeks, then suddenly noticed vertical diplopia on 4/14/25  Was seen in ED and MRI Head/Orbits and CTA Head/Neck were unremarkable  Since then, double vision has improved but still occurs if very tired or after prolonged periods of work  She was given prism glasses with total of 4 prism diopter (2 BU OD, and 2 BD OS) power that helped initially but now give her diplopia    5/26/25: Has very small left hyperphoria that is worse in right gaze and left tilt and with mild IOOA on left side  Plan:   Findings consistent with left sided fourth nerve palsy  She has very small vertical fusion amplitudes and does not tolerate any degree of prism offset  Her hx of normal MRI, interval improvement, and being given prism glasses that helped initially but now are too strong, are most consistent with a microvascular cause of a fourth nerve palsy, though no classic risk factors of HTN, T2DM, JADE, etc.  Recommend observation to monitor for continued spontaneous improvement  RTC 2-3 months

## 2025-06-16 ENCOUNTER — OFFICE VISIT (OUTPATIENT)
Dept: DERMATOLOGY | Facility: CLINIC | Age: 60
End: 2025-06-16
Payer: COMMERCIAL

## 2025-06-16 DIAGNOSIS — D23.9 DERMATOFIBROMA: ICD-10-CM

## 2025-06-16 DIAGNOSIS — D22.9 NEVUS: ICD-10-CM

## 2025-06-16 DIAGNOSIS — L70.0 ACNE VULGARIS: Primary | ICD-10-CM

## 2025-06-16 DIAGNOSIS — L91.8 SKIN TAG: ICD-10-CM

## 2025-06-16 DIAGNOSIS — Z85.828 PERSONAL HISTORY OF SKIN CANCER: ICD-10-CM

## 2025-06-16 DIAGNOSIS — L82.1 SK (SEBORRHEIC KERATOSIS): ICD-10-CM

## 2025-06-16 PROCEDURE — 1159F MED LIST DOCD IN RCRD: CPT | Mod: CPTII,S$GLB,, | Performed by: DERMATOLOGY

## 2025-06-16 PROCEDURE — 99999 PR PBB SHADOW E&M-EST. PATIENT-LVL III: CPT | Mod: PBBFAC,,, | Performed by: DERMATOLOGY

## 2025-06-16 PROCEDURE — 1160F RVW MEDS BY RX/DR IN RCRD: CPT | Mod: CPTII,S$GLB,, | Performed by: DERMATOLOGY

## 2025-06-16 PROCEDURE — 3044F HG A1C LEVEL LT 7.0%: CPT | Mod: CPTII,S$GLB,, | Performed by: DERMATOLOGY

## 2025-06-16 PROCEDURE — 99214 OFFICE O/P EST MOD 30 MIN: CPT | Mod: S$GLB,,, | Performed by: DERMATOLOGY

## 2025-06-16 PROCEDURE — G2211 COMPLEX E/M VISIT ADD ON: HCPCS | Mod: S$GLB,,, | Performed by: DERMATOLOGY

## 2025-06-16 RX ORDER — CLINDAMYCIN PHOSPHATE AND BENZOYL PEROXIDE 10; 50 MG/G; MG/G
GEL TOPICAL
Qty: 45 G | Refills: 1 | Status: SHIPPED | OUTPATIENT
Start: 2025-06-16

## 2025-06-16 NOTE — PROGRESS NOTES
Subjective:      Patient ID:  Grace Beltran is a 59 y.o. female who presents for   Chief Complaint   Patient presents with    Skin Check     tbse     Patient is here today for TBSE.    Patient is here today for a skin check.   Pt has a history of  moderate sun exposure in the past.   Pt recalls several blistering sunburns in the past- yes  Pt has history of tanning bed use- no  Pt has  had moles removed in the past- yes, multiple  Pt has history of melanoma in first degree relatives-  yes, maternal grandfather    Pt has no new concerns today.     This is a high risk patient here to check for the development of new lesions.    Pt has h/o BCC.     Pt has mole in pubic area she would like checked.   Also has face care questions- gets acne still intermittently . Needs more treatment. Does use sulfa wash which helps, needs a spot tx          Review of Systems   Skin:  Positive for daily sunscreen use (most of time) and activity-related sunscreen use. Negative for recent sunburn and wears hat.   Hematologic/Lymphatic: Does not bruise/bleed easily.       Objective:   Physical Exam   Constitutional: She appears well-developed and well-nourished. No distress.   Neurological: She is alert and oriented to person, place, and time. She is not disoriented.   Psychiatric: She has a normal mood and affect.   Skin:   Areas Examined (abnormalities noted in diagram):   Scalp / Hair Palpated and Inspected  Head / Face Inspection Performed  Neck Inspection Performed  Chest / Axilla Inspection Performed  Abdomen Inspection Performed  Genitals / Buttocks / Groin Inspection Performed  Back Inspection Performed  RUE Inspected  LUE Inspection Performed  RLE Inspected  LLE Inspection Performed  Nails and Digits Inspection Performed                     Diagram Legend     Erythematous scaling macule/papule c/w actinic keratosis       Vascular papule c/w angioma      Pigmented verrucoid papule/plaque c/w seborrheic keratosis      Yellow  umbilicated papule c/w sebaceous hyperplasia      Irregularly shaped tan macule c/w lentigo     1-2 mm smooth white papules consistent with Milia      Movable subcutaneous cyst with punctum c/w epidermal inclusion cyst      Subcutaneous movable cyst c/w pilar cyst      Firm pink to brown papule c/w dermatofibroma      Pedunculated fleshy papule(s) c/w skin tag(s)      Evenly pigmented macule c/w junctional nevus     Mildly variegated pigmented, slightly irregular-bordered macule c/w mildly atypical nevus      Flesh colored to evenly pigmented papule c/w intradermal nevus       Pink pearly papule/plaque c/w basal cell carcinoma      Erythematous hyperkeratotic cursted plaque c/w SCC      Surgical scar with no sign of skin cancer recurrence      Open and closed comedones      Inflammatory papules and pustules      Verrucoid papule consistent consistent with wart     Erythematous eczematous patches and plaques     Dystrophic onycholytic nail with subungual debris c/w onychomycosis     Umbilicated papule    Erythematous-base heme-crusted tan verrucoid plaque consistent with inflamed seborrheic keratosis     Erythematous Silvery Scaling Plaque c/w Psoriasis     See annotation      Assessment / Plan:        Acne vulgaris  Wash with rx sulfacleanser qhs   -     clindamycin-benzoyl peroxide gel; Apply to affected area on face qd prn flare  Dispense: 45 g; Refill: 1  Spf qam     Skin tag  Reassurance given to patient. No treatment is necessary.   Treatment of benign, asymptomatic lesions may be considered cosmetic.    Nevus  Discussed ABCDE's of nevi.  Monitor for new mole or moles that are becoming bigger, darker, irritated, or developing irregular borders. Brochure provided. Instructed patient to observe lesion(s) for changes and follow up in clinic if changes are noted. Patient to monitor skin at home for new or changing lesions.     SK (seborrheic keratosis)  These are benign inherited growths without a malignant  potential. Reassurance given to patient. No treatment is necessary.     Dermatofibroma  These are benign bundles of scar tissue that can arise spontaneously or after trauma from a bug bite or nicking yourself shaving, or other minimal trauma.   They are very common in middle aged adults and commonly found on the lower legs, arms above the elbows, and trunk.   Removal of lesions is not recommended as the lesion is just replaced with an additional scar, however if lesion is symptomatic, removal can be considered. Lesions can recur.     Personal history of skin cancer  Pt with history of non melanoma skin cancer  Total body skin examination performed today including at least 12 points as noted in physical examination. No suspicious lesions noted.Monitor for new or changing lesions as discussed such as pink scaly patches that are occasionally tender or not healing, 'pimples' that never go away, lesions that are not healing or bleeding.              Follow up in about 1 year (around 6/16/2026) for TBSE.

## 2025-07-28 ENCOUNTER — OFFICE VISIT (OUTPATIENT)
Dept: OPHTHALMOLOGY | Facility: CLINIC | Age: 60
End: 2025-07-28
Payer: COMMERCIAL

## 2025-07-28 DIAGNOSIS — H49.12 LEFT-SIDED FOURTH CRANIAL NERVE PALSY: Primary | ICD-10-CM

## 2025-07-28 PROCEDURE — 99999 PR PBB SHADOW E&M-EST. PATIENT-LVL II: CPT | Mod: PBBFAC,,, | Performed by: STUDENT IN AN ORGANIZED HEALTH CARE EDUCATION/TRAINING PROGRAM

## 2025-07-28 PROCEDURE — 3044F HG A1C LEVEL LT 7.0%: CPT | Mod: CPTII,S$GLB,, | Performed by: STUDENT IN AN ORGANIZED HEALTH CARE EDUCATION/TRAINING PROGRAM

## 2025-07-28 PROCEDURE — 99213 OFFICE O/P EST LOW 20 MIN: CPT | Mod: S$GLB,,, | Performed by: STUDENT IN AN ORGANIZED HEALTH CARE EDUCATION/TRAINING PROGRAM

## 2025-07-28 PROCEDURE — 92060 SENSORIMOTOR EXAMINATION: CPT | Mod: S$GLB,,, | Performed by: STUDENT IN AN ORGANIZED HEALTH CARE EDUCATION/TRAINING PROGRAM

## 2025-07-28 PROCEDURE — 1160F RVW MEDS BY RX/DR IN RCRD: CPT | Mod: CPTII,S$GLB,, | Performed by: STUDENT IN AN ORGANIZED HEALTH CARE EDUCATION/TRAINING PROGRAM

## 2025-07-28 PROCEDURE — 1159F MED LIST DOCD IN RCRD: CPT | Mod: CPTII,S$GLB,, | Performed by: STUDENT IN AN ORGANIZED HEALTH CARE EDUCATION/TRAINING PROGRAM

## 2025-07-28 NOTE — PROGRESS NOTES
HPI    DLS: 05/26/2025  Grace Beltran is a 59 y.o. female who returns for 2 month f/u for left   sided fourth cranial nerve palsy.   Today, she reports that she has very minimal intermittent diplopia at near   and it resolves within minutes. Otherwise it is not causing her any   problems throughout the day.    No Current Eye Meds.   Last edited by Bienvenido Bhatia MD on 7/28/2025 11:01 AM.        ROS    Negative for: Constitutional, Gastrointestinal, Neurological, Skin,   Genitourinary, Musculoskeletal, HENT, Endocrine, Cardiovascular, Eyes,   Respiratory, Psychiatric, Allergic/Imm, Heme/Lymph  Last edited by Bienvenido Bhatia MD on 7/28/2025 11:01 AM.        Assessment /Plan     For exam results, see Encounter Report.    Left-sided fourth cranial nerve palsy        Problem List Items Addressed This Visit          Ophtho    Left-sided fourth cranial nerve palsy - Primary    Current Assessment & Plan   Patient with hx of blurry vision for a few weeks, then suddenly noticed vertical diplopia on 4/14/25  Was seen in ED and MRI Head/Orbits and CTA Head/Neck were unremarkable  Since then, double vision has improved but still occurs if very tired or after prolonged periods of work  She was given prism glasses with total of 4 prism diopter (2 BU OD, and 2 BD OS) power that helped initially but now give her diplopia    5/26/25: Has very small left hyperphoria that is worse in right gaze and left tilt and with mild IOOA on left side  Plan:   Findings consistent with left sided fourth nerve palsy  She has very small vertical fusion amplitudes and does not tolerate any degree of prism offset  Her hx of normal MRI, interval improvement, and being given prism glasses that helped initially but now are too strong, are most consistent with a microvascular cause of a fourth nerve palsy, though no classic risk factors of HTN, T2DM, JADE, etc.    7/28/25: Patient reports minimal symptoms of diplopia. ON exam, still has tiny left  hypertropia. Reports no improvement in vision with any degree of prism correction    Plan:  Given minimal symptoms, continue to observe  Etiology still most likely microvascular , however given no improvement may also be decompensated phoria   RTC 4 months             Bienvenido Bhatia MD  Pediatric Ophthalmology and Adult Strabismus  [facility]

## 2025-07-28 NOTE — ASSESSMENT & PLAN NOTE
Patient with hx of blurry vision for a few weeks, then suddenly noticed vertical diplopia on 4/14/25  Was seen in ED and MRI Head/Orbits and CTA Head/Neck were unremarkable  Since then, double vision has improved but still occurs if very tired or after prolonged periods of work  She was given prism glasses with total of 4 prism diopter (2 BU OD, and 2 BD OS) power that helped initially but now give her diplopia    5/26/25: Has very small left hyperphoria that is worse in right gaze and left tilt and with mild IOOA on left side  Plan:   Findings consistent with left sided fourth nerve palsy  She has very small vertical fusion amplitudes and does not tolerate any degree of prism offset  Her hx of normal MRI, interval improvement, and being given prism glasses that helped initially but now are too strong, are most consistent with a microvascular cause of a fourth nerve palsy, though no classic risk factors of HTN, T2DM, JADE, etc.    7/28/25: Patient reports minimal symptoms of diplopia. ON exam, still has tiny left hypertropia. Reports no improvement in vision with any degree of prism correction    Plan:  Given minimal symptoms, continue to observe  Etiology still most likely microvascular , however given no improvement may also be decompensated phoria   RTC 4 months

## 2025-08-18 ENCOUNTER — OFFICE VISIT (OUTPATIENT)
Dept: INTERNAL MEDICINE | Facility: CLINIC | Age: 60
End: 2025-08-18
Payer: COMMERCIAL

## 2025-08-18 ENCOUNTER — TELEPHONE (OUTPATIENT)
Dept: INTERNAL MEDICINE | Facility: CLINIC | Age: 60
End: 2025-08-18

## 2025-08-18 DIAGNOSIS — R05.9 COUGH, UNSPECIFIED TYPE: ICD-10-CM

## 2025-08-18 DIAGNOSIS — Z01.818 ENCOUNTER FOR PREOPERATIVE ASSESSMENT: Primary | ICD-10-CM

## 2025-08-18 PROCEDURE — 99214 OFFICE O/P EST MOD 30 MIN: CPT | Mod: S$GLB,,, | Performed by: INTERNAL MEDICINE

## 2025-08-18 PROCEDURE — 3044F HG A1C LEVEL LT 7.0%: CPT | Mod: CPTII,S$GLB,, | Performed by: INTERNAL MEDICINE

## 2025-08-18 PROCEDURE — 99999 PR PBB SHADOW E&M-EST. PATIENT-LVL II: CPT | Mod: PBBFAC,,, | Performed by: INTERNAL MEDICINE

## 2025-08-18 PROCEDURE — G2211 COMPLEX E/M VISIT ADD ON: HCPCS | Mod: S$GLB,,, | Performed by: INTERNAL MEDICINE

## 2025-08-18 PROCEDURE — 1159F MED LIST DOCD IN RCRD: CPT | Mod: CPTII,S$GLB,, | Performed by: INTERNAL MEDICINE

## 2025-08-18 PROCEDURE — 1160F RVW MEDS BY RX/DR IN RCRD: CPT | Mod: CPTII,S$GLB,, | Performed by: INTERNAL MEDICINE

## 2025-08-21 ENCOUNTER — TELEPHONE (OUTPATIENT)
Dept: INTERNAL MEDICINE | Facility: CLINIC | Age: 60
End: 2025-08-21
Payer: COMMERCIAL